# Patient Record
Sex: FEMALE | Race: WHITE | NOT HISPANIC OR LATINO
[De-identification: names, ages, dates, MRNs, and addresses within clinical notes are randomized per-mention and may not be internally consistent; named-entity substitution may affect disease eponyms.]

---

## 2017-02-16 PROBLEM — Z47.1 AFTERCARE FOLLOWING RIGHT KNEE JOINT REPLACEMENT SURGERY: Status: ACTIVE | Noted: 2017-02-16

## 2017-02-17 ENCOUNTER — APPOINTMENT (OUTPATIENT)
Dept: ORTHOPEDIC SURGERY | Facility: CLINIC | Age: 72
End: 2017-02-17

## 2017-02-17 VITALS
WEIGHT: 185 LBS | HEART RATE: 89 BPM | BODY MASS INDEX: 30.82 KG/M2 | SYSTOLIC BLOOD PRESSURE: 132 MMHG | DIASTOLIC BLOOD PRESSURE: 79 MMHG | HEIGHT: 65 IN

## 2017-02-17 DIAGNOSIS — Z47.1 AFTERCARE FOLLOWING JOINT REPLACEMENT SURGERY: ICD-10-CM

## 2017-02-17 DIAGNOSIS — M25.562 PAIN IN LEFT KNEE: ICD-10-CM

## 2017-02-17 DIAGNOSIS — M17.12 UNILATERAL PRIMARY OSTEOARTHRITIS, LEFT KNEE: ICD-10-CM

## 2017-02-17 DIAGNOSIS — Z96.651 AFTERCARE FOLLOWING JOINT REPLACEMENT SURGERY: ICD-10-CM

## 2017-02-23 ENCOUNTER — APPOINTMENT (OUTPATIENT)
Dept: CARDIOTHORACIC SURGERY | Facility: CLINIC | Age: 72
End: 2017-02-23

## 2017-02-23 VITALS
OXYGEN SATURATION: 98 % | SYSTOLIC BLOOD PRESSURE: 127 MMHG | BODY MASS INDEX: 30.82 KG/M2 | RESPIRATION RATE: 15 BRPM | TEMPERATURE: 98.2 F | WEIGHT: 185 LBS | DIASTOLIC BLOOD PRESSURE: 79 MMHG | HEART RATE: 90 BPM | HEIGHT: 65 IN

## 2017-02-23 DIAGNOSIS — Z98.890 OTHER SPECIFIED POSTPROCEDURAL STATES: ICD-10-CM

## 2017-02-23 DIAGNOSIS — Z95.1 PRESENCE OF AORTOCORONARY BYPASS GRAFT: ICD-10-CM

## 2017-02-23 RX ORDER — METOPROLOL SUCCINATE 50 MG/1
50 TABLET, EXTENDED RELEASE ORAL
Qty: 90 | Refills: 3 | Status: ACTIVE | COMMUNITY
Start: 2017-02-23

## 2017-02-24 ENCOUNTER — APPOINTMENT (OUTPATIENT)
Dept: CARDIOTHORACIC SURGERY | Facility: CLINIC | Age: 72
End: 2017-02-24

## 2017-03-10 ENCOUNTER — APPOINTMENT (OUTPATIENT)
Dept: ORTHOPEDIC SURGERY | Facility: CLINIC | Age: 72
End: 2017-03-10

## 2017-06-23 ENCOUNTER — APPOINTMENT (OUTPATIENT)
Dept: ORTHOPEDIC SURGERY | Facility: CLINIC | Age: 72
End: 2017-06-23

## 2017-06-23 VITALS
SYSTOLIC BLOOD PRESSURE: 131 MMHG | BODY MASS INDEX: 30.82 KG/M2 | DIASTOLIC BLOOD PRESSURE: 85 MMHG | HEIGHT: 65 IN | HEART RATE: 88 BPM | WEIGHT: 185 LBS

## 2017-06-23 DIAGNOSIS — M70.71 OTHER BURSITIS OF HIP, RIGHT HIP: ICD-10-CM

## 2017-09-14 ENCOUNTER — RX RENEWAL (OUTPATIENT)
Age: 72
End: 2017-09-14

## 2018-01-26 ENCOUNTER — APPOINTMENT (OUTPATIENT)
Dept: ORTHOPEDIC SURGERY | Facility: CLINIC | Age: 73
End: 2018-01-26
Payer: MEDICARE

## 2018-01-26 VITALS
HEART RATE: 70 BPM | DIASTOLIC BLOOD PRESSURE: 71 MMHG | WEIGHT: 185 LBS | BODY MASS INDEX: 30.82 KG/M2 | SYSTOLIC BLOOD PRESSURE: 141 MMHG | HEIGHT: 65 IN

## 2018-01-26 DIAGNOSIS — M25.551 PAIN IN RIGHT HIP: ICD-10-CM

## 2018-01-26 PROCEDURE — 73502 X-RAY EXAM HIP UNI 2-3 VIEWS: CPT

## 2018-01-26 PROCEDURE — 73562 X-RAY EXAM OF KNEE 3: CPT | Mod: RT

## 2018-01-26 PROCEDURE — 99213 OFFICE O/P EST LOW 20 MIN: CPT

## 2018-01-26 RX ORDER — FEXOFENADINE/PSEUDOEPHEDRINE 60MG-120MG
TABLET, EXTENDED RELEASE 12 HR ORAL
Refills: 0 | Status: ACTIVE | COMMUNITY

## 2018-01-26 RX ORDER — DULAGLUTIDE 1.5 MG/.5ML
1.5 INJECTION, SOLUTION SUBCUTANEOUS
Qty: 6 | Refills: 0 | Status: ACTIVE | COMMUNITY
Start: 2017-09-18

## 2018-01-26 RX ORDER — DOXYCYCLINE HYCLATE 100 MG/1
100 CAPSULE ORAL
Qty: 14 | Refills: 0 | Status: ACTIVE | COMMUNITY
Start: 2018-01-02

## 2018-01-26 RX ORDER — MELOXICAM 7.5 MG/1
7.5 TABLET ORAL
Qty: 30 | Refills: 1 | Status: ACTIVE | COMMUNITY
Start: 2018-01-26 | End: 1900-01-01

## 2018-09-27 ENCOUNTER — RX RENEWAL (OUTPATIENT)
Age: 73
End: 2018-09-27

## 2018-10-02 ENCOUNTER — RX RENEWAL (OUTPATIENT)
Age: 73
End: 2018-10-02

## 2019-06-30 ENCOUNTER — EMERGENCY (EMERGENCY)
Facility: HOSPITAL | Age: 74
LOS: 1 days | Discharge: HOME | End: 2019-06-30
Attending: EMERGENCY MEDICINE | Admitting: EMERGENCY MEDICINE
Payer: MEDICARE

## 2019-06-30 VITALS
OXYGEN SATURATION: 100 % | TEMPERATURE: 98 F | DIASTOLIC BLOOD PRESSURE: 79 MMHG | HEART RATE: 88 BPM | RESPIRATION RATE: 16 BRPM | SYSTOLIC BLOOD PRESSURE: 142 MMHG

## 2019-06-30 VITALS
HEIGHT: 65 IN | WEIGHT: 182.98 LBS | DIASTOLIC BLOOD PRESSURE: 66 MMHG | RESPIRATION RATE: 18 BRPM | SYSTOLIC BLOOD PRESSURE: 131 MMHG | TEMPERATURE: 99 F | HEART RATE: 88 BPM | OXYGEN SATURATION: 96 %

## 2019-06-30 DIAGNOSIS — Z98.49 CATARACT EXTRACTION STATUS, UNSPECIFIED EYE: Chronic | ICD-10-CM

## 2019-06-30 DIAGNOSIS — Z98.89 OTHER SPECIFIED POSTPROCEDURAL STATES: Chronic | ICD-10-CM

## 2019-06-30 DIAGNOSIS — Z96.651 PRESENCE OF RIGHT ARTIFICIAL KNEE JOINT: Chronic | ICD-10-CM

## 2019-06-30 PROCEDURE — 73090 X-RAY EXAM OF FOREARM: CPT | Mod: 26,LT

## 2019-06-30 PROCEDURE — 96372 THER/PROPH/DIAG INJ SC/IM: CPT | Mod: XU

## 2019-06-30 PROCEDURE — 29105 APPLICATION LONG ARM SPLINT: CPT

## 2019-06-30 PROCEDURE — 73060 X-RAY EXAM OF HUMERUS: CPT | Mod: 26,RT

## 2019-06-30 PROCEDURE — 99284 EMERGENCY DEPT VISIT MOD MDM: CPT

## 2019-06-30 PROCEDURE — 29105 APPLICATION LONG ARM SPLINT: CPT | Mod: RT

## 2019-06-30 PROCEDURE — 73060 X-RAY EXAM OF HUMERUS: CPT

## 2019-06-30 PROCEDURE — 99284 EMERGENCY DEPT VISIT MOD MDM: CPT | Mod: 25

## 2019-06-30 PROCEDURE — 73080 X-RAY EXAM OF ELBOW: CPT | Mod: 26,RT

## 2019-06-30 PROCEDURE — 73080 X-RAY EXAM OF ELBOW: CPT

## 2019-06-30 PROCEDURE — 73090 X-RAY EXAM OF FOREARM: CPT

## 2019-06-30 RX ORDER — TRAMADOL HYDROCHLORIDE 50 MG/1
1 TABLET ORAL
Qty: 12 | Refills: 0
Start: 2019-06-30 | End: 2019-07-02

## 2019-06-30 RX ORDER — TRAMADOL HYDROCHLORIDE 50 MG/1
50 TABLET ORAL ONCE
Refills: 0 | Status: DISCONTINUED | OUTPATIENT
Start: 2019-06-30 | End: 2019-06-30

## 2019-06-30 RX ORDER — KETOROLAC TROMETHAMINE 30 MG/ML
15 SYRINGE (ML) INJECTION ONCE
Refills: 0 | Status: DISCONTINUED | OUTPATIENT
Start: 2019-06-30 | End: 2019-06-30

## 2019-06-30 RX ORDER — KETOROLAC TROMETHAMINE 30 MG/ML
30 SYRINGE (ML) INJECTION ONCE
Refills: 0 | Status: DISCONTINUED | OUTPATIENT
Start: 2019-06-30 | End: 2019-06-30

## 2019-06-30 RX ADMIN — TRAMADOL HYDROCHLORIDE 50 MILLIGRAM(S): 50 TABLET ORAL at 19:56

## 2019-06-30 RX ADMIN — Medication 15 MILLIGRAM(S): at 19:57

## 2019-06-30 NOTE — ED PROVIDER NOTE - OBJECTIVE STATEMENT
74 y female presents right elbow pain, injury, states she tripped and fell at home in her yard on Wednesday, injured her right elbow, went to urgent care was diagnosed with a fx, splint was applied, states yesterday noticed arm swelling, splint feels "tight".   prescribed tramadol for pain.  denies head injury , no loc.  PMD  Dr Mcrae, Ortho Dr Bledsoe

## 2019-06-30 NOTE — ED PROVIDER NOTE - PROGRESS NOTE DETAILS
call out to ortho awaiting call back spoke with ortho resident , advised long arm splint follow up with her orthopedist splint applied, rx tramadol sent to pharmacy, advised follow up with her orthopedic Dr Bledsoe, patient requested name of orthopedic , given information for Dr Deutsch, ortho, states she was also given information for an orthopedic , she will call tomorrow

## 2019-06-30 NOTE — ED PROVIDER NOTE - ATTENDING CONTRIBUTION TO CARE
I have personally performed a face to face diagnostic evaluation on this patient.  I have reviewed the PA note and agree with the history, exam, and plan of care, except as noted.  History and Exam by me shows patient c/o right elbow pain, increased swelling of hand and arm, patient tripped and fell onto right elbow on Wednesday, went to urgent care and was splinted, patient alert and oriented, right hand edema, left upper arm eccymosis, tender to right elbow, able to open and close hand, extend wrist, (+)radial pulse, f/u xrays, ortho.

## 2019-06-30 NOTE — ED ADULT NURSE NOTE - NSIMPLEMENTINTERV_GEN_ALL_ED
Implemented All Fall Risk Interventions:  [place] to call system. Call bell, personal items and telephone within reach. Instruct patient to call for assistance. Room bathroom lighting operational. Non-slip footwear when patient is off stretcher. Physically safe environment: no spills, clutter or unnecessary equipment. Stretcher in lowest position, wheels locked, appropriate side rails in place. Provide visual cue, wrist band, yellow gown, etc. Monitor gait and stability. Monitor for mental status changes and reorient to person, place, and time. Review medications for side effects contributing to fall risk. Reinforce activity limits and safety measures with patient and family.

## 2019-06-30 NOTE — ED ADULT NURSE NOTE - OBJECTIVE STATEMENT
pt fell on Wednesday went to  was dx with right elbow fx and told to f/u with Ortho- pt arm splinted and wrapped - pt c/o pain and swelling to hand and above splint on arm

## 2020-06-12 ENCOUNTER — APPOINTMENT (OUTPATIENT)
Dept: ORTHOPEDIC SURGERY | Facility: CLINIC | Age: 75
End: 2020-06-12
Payer: MEDICARE

## 2020-06-12 VITALS
HEIGHT: 65 IN | WEIGHT: 185 LBS | SYSTOLIC BLOOD PRESSURE: 133 MMHG | HEART RATE: 69 BPM | DIASTOLIC BLOOD PRESSURE: 70 MMHG | BODY MASS INDEX: 30.82 KG/M2

## 2020-06-12 VITALS — TEMPERATURE: 97.1 F

## 2020-06-12 PROCEDURE — 73562 X-RAY EXAM OF KNEE 3: CPT | Mod: RT

## 2020-06-12 PROCEDURE — 99213 OFFICE O/P EST LOW 20 MIN: CPT

## 2020-06-12 PROCEDURE — 72170 X-RAY EXAM OF PELVIS: CPT | Mod: 59

## 2020-06-12 NOTE — HISTORY OF PRESENT ILLNESS
[de-identified] : The patient is a 75 female who presents today for evaluation of right knee. She is status post right total knee replacement by Dr. Shay approximately 10 years a. She states that she has been doing very well but was recently she started to notice some discomfort in her knee. She does state that 2 years ago she did sustain an injury to her elbow/fracture following a fall in which she also injured her knee. She also states that last year she fell and fractured her right shoulder and injured her knee. After both incidences she states the pain in her knee with short-lived and returned back to her normal activities. She states that she's been doing very well until about a few weeks ago. Now she states she has some pain and also the stiffening sensation in her knee which radiates up her thigh. Patient has been seen in the past regarding her right hip she was explained that she does have severe osteoarthritis in the require a total hip replacement. She was treated in the past with intra-articular cortisone injections she states she had some relief for a short period. She denies any recent injury he describes his pain as a tightness or with stiffness in her knee which does radiate up towards her hip. She denies any swelling buckling or locking. [Stable] : stable

## 2020-06-12 NOTE — PHYSICAL EXAM
[Cane] : ambulates with cane [Antalgic] : antalgic [LE] : 5/5 motor strength in bilateral lower extremities [ALL] : dorsalis pedis, posterior tibial, femoral, popliteal, and radial 2+ and symmetric bilaterally [de-identified] : Physical examination of the right knee. Patient is status post right total knee replacement. There is a well-healed surgical scar noted. With no evidence of infection superficial or deep. There is no redness or heat or discharge noted. There is minimal diffuse pain and tenderness primarily in the patellofemoral compartment and the area of the facet and transverse. The alignment is within neutral position. There is no evidence of ligamentous laxity. No evidence of any muscle atrophy. She does have good range of motion in regards to her right knee and evidence of any muscle atrophy. No evidence of any motor or sensory deficit. Patient does have good distal pulses with no calf tenderness.\par \par Upon further evaluation for the right hip was examined which shows marked pain and tenderness on the lateral aspect of the hip which does radiate into the groin. This is accompanied with a marked decreased range of motion in all planes of the right there is no evidence of leg length discrepancy. No evidence of any muscle atrophy. Mild to minimal or sensory deficit. [de-identified] : X-rays of the right knee reveal status post right total knee replacement. Hardware is in place and shows excellent alignment as well as fixation. There is no evidence of any fracture dislocation or loosening of hardware.\par \par As of the pelvis shows severe osteoarthritis with marked joint space. There is some osteophyte formation noted as well.

## 2020-06-12 NOTE — DISCUSSION/SUMMARY
[de-identified] : Plan the patient is to continue with the present level of activities. She wasn't sure that she is doing very well following her right total knee replacement. However the discomfort that she is feeling is most associated with her right hip. Patient does have severe osteoarthritic changes of the right hip. I was explained the different modalities of treatment. She was referred for a intra-articular cortisone injection to the performed ultrasound or Fluro. He was explained the risks benefits pros and cons of injection as well as alternatives. It was also explained that this is not a guarantee her complete relief of her symptoms. Note is afebrile for her osteoarthritis. Patient understands this intra-articular injection. She is advised to continue with a good home exercise  was seated and says when needed.

## 2020-07-07 ENCOUNTER — APPOINTMENT (OUTPATIENT)
Dept: ULTRASOUND IMAGING | Facility: CLINIC | Age: 75
End: 2020-07-07
Payer: MEDICARE

## 2020-07-07 ENCOUNTER — OUTPATIENT (OUTPATIENT)
Dept: OUTPATIENT SERVICES | Facility: HOSPITAL | Age: 75
LOS: 1 days | End: 2020-07-07

## 2020-07-07 ENCOUNTER — RESULT REVIEW (OUTPATIENT)
Age: 75
End: 2020-07-07

## 2020-07-07 DIAGNOSIS — Z98.49 CATARACT EXTRACTION STATUS, UNSPECIFIED EYE: Chronic | ICD-10-CM

## 2020-07-07 DIAGNOSIS — Z98.89 OTHER SPECIFIED POSTPROCEDURAL STATES: Chronic | ICD-10-CM

## 2020-07-07 DIAGNOSIS — Z96.651 PRESENCE OF RIGHT ARTIFICIAL KNEE JOINT: Chronic | ICD-10-CM

## 2020-07-07 DIAGNOSIS — M16.11 UNILATERAL PRIMARY OSTEOARTHRITIS, RIGHT HIP: ICD-10-CM

## 2020-07-07 PROCEDURE — 20611 DRAIN/INJ JOINT/BURSA W/US: CPT | Mod: RT

## 2020-08-13 ENCOUNTER — APPOINTMENT (OUTPATIENT)
Dept: SURGERY | Facility: CLINIC | Age: 75
End: 2020-08-13
Payer: MEDICARE

## 2020-08-13 PROCEDURE — 99213 OFFICE O/P EST LOW 20 MIN: CPT

## 2020-08-27 ENCOUNTER — APPOINTMENT (OUTPATIENT)
Dept: SURGERY | Facility: CLINIC | Age: 75
End: 2020-08-27

## 2020-08-28 ENCOUNTER — RESULT REVIEW (OUTPATIENT)
Age: 75
End: 2020-08-28

## 2020-08-28 ENCOUNTER — OUTPATIENT (OUTPATIENT)
Dept: OUTPATIENT SERVICES | Facility: HOSPITAL | Age: 75
LOS: 1 days | End: 2020-08-28
Payer: MEDICARE

## 2020-08-28 VITALS
RESPIRATION RATE: 16 BRPM | HEART RATE: 75 BPM | DIASTOLIC BLOOD PRESSURE: 77 MMHG | HEIGHT: 65 IN | SYSTOLIC BLOOD PRESSURE: 132 MMHG | WEIGHT: 177.91 LBS | TEMPERATURE: 97 F | OXYGEN SATURATION: 96 %

## 2020-08-28 DIAGNOSIS — Z98.49 CATARACT EXTRACTION STATUS, UNSPECIFIED EYE: Chronic | ICD-10-CM

## 2020-08-28 DIAGNOSIS — Z98.890 OTHER SPECIFIED POSTPROCEDURAL STATES: Chronic | ICD-10-CM

## 2020-08-28 DIAGNOSIS — Z95.1 PRESENCE OF AORTOCORONARY BYPASS GRAFT: Chronic | ICD-10-CM

## 2020-08-28 DIAGNOSIS — D48.61 NEOPLASM OF UNCERTAIN BEHAVIOR OF RIGHT BREAST: ICD-10-CM

## 2020-08-28 DIAGNOSIS — Z98.89 OTHER SPECIFIED POSTPROCEDURAL STATES: Chronic | ICD-10-CM

## 2020-08-28 DIAGNOSIS — Z01.818 ENCOUNTER FOR OTHER PREPROCEDURAL EXAMINATION: ICD-10-CM

## 2020-08-28 DIAGNOSIS — Z96.651 PRESENCE OF RIGHT ARTIFICIAL KNEE JOINT: Chronic | ICD-10-CM

## 2020-08-28 DIAGNOSIS — S42.409A UNSPECIFIED FRACTURE OF LOWER END OF UNSPECIFIED HUMERUS, INITIAL ENCOUNTER FOR CLOSED FRACTURE: Chronic | ICD-10-CM

## 2020-08-28 LAB
ALBUMIN SERPL ELPH-MCNC: 4.4 G/DL — SIGNIFICANT CHANGE UP (ref 3.3–5)
ALP SERPL-CCNC: 51 U/L — SIGNIFICANT CHANGE UP (ref 40–120)
ALT FLD-CCNC: 69 U/L — SIGNIFICANT CHANGE UP (ref 12–78)
ANION GAP SERPL CALC-SCNC: 4 MMOL/L — LOW (ref 5–17)
AST SERPL-CCNC: 62 U/L — HIGH (ref 15–37)
BILIRUB SERPL-MCNC: 0.7 MG/DL — SIGNIFICANT CHANGE UP (ref 0.2–1.2)
BUN SERPL-MCNC: 14 MG/DL — SIGNIFICANT CHANGE UP (ref 7–23)
CALCIUM SERPL-MCNC: 10.2 MG/DL — HIGH (ref 8.5–10.1)
CHLORIDE SERPL-SCNC: 105 MMOL/L — SIGNIFICANT CHANGE UP (ref 96–108)
CO2 SERPL-SCNC: 30 MMOL/L — SIGNIFICANT CHANGE UP (ref 22–31)
CREAT SERPL-MCNC: 0.52 MG/DL — SIGNIFICANT CHANGE UP (ref 0.5–1.3)
GLUCOSE SERPL-MCNC: 93 MG/DL — SIGNIFICANT CHANGE UP (ref 70–99)
HCT VFR BLD CALC: 38.4 % — SIGNIFICANT CHANGE UP (ref 34.5–45)
HGB BLD-MCNC: 12.8 G/DL — SIGNIFICANT CHANGE UP (ref 11.5–15.5)
MCHC RBC-ENTMCNC: 27.5 PG — SIGNIFICANT CHANGE UP (ref 27–34)
MCHC RBC-ENTMCNC: 33.3 GM/DL — SIGNIFICANT CHANGE UP (ref 32–36)
MCV RBC AUTO: 82.4 FL — SIGNIFICANT CHANGE UP (ref 80–100)
NRBC # BLD: 0 /100 WBCS — SIGNIFICANT CHANGE UP (ref 0–0)
PLATELET # BLD AUTO: 196 K/UL — SIGNIFICANT CHANGE UP (ref 150–400)
POTASSIUM SERPL-MCNC: 4.2 MMOL/L — SIGNIFICANT CHANGE UP (ref 3.5–5.3)
POTASSIUM SERPL-SCNC: 4.2 MMOL/L — SIGNIFICANT CHANGE UP (ref 3.5–5.3)
PROT SERPL-MCNC: 7.9 G/DL — SIGNIFICANT CHANGE UP (ref 6–8.3)
RBC # BLD: 4.66 M/UL — SIGNIFICANT CHANGE UP (ref 3.8–5.2)
RBC # FLD: 13 % — SIGNIFICANT CHANGE UP (ref 10.3–14.5)
SODIUM SERPL-SCNC: 139 MMOL/L — SIGNIFICANT CHANGE UP (ref 135–145)
WBC # BLD: 6.56 K/UL — SIGNIFICANT CHANGE UP (ref 3.8–10.5)
WBC # FLD AUTO: 6.56 K/UL — SIGNIFICANT CHANGE UP (ref 3.8–10.5)

## 2020-08-28 PROCEDURE — 93010 ELECTROCARDIOGRAM REPORT: CPT

## 2020-08-28 PROCEDURE — 80053 COMPREHEN METABOLIC PANEL: CPT

## 2020-08-28 PROCEDURE — 93005 ELECTROCARDIOGRAM TRACING: CPT

## 2020-08-28 PROCEDURE — 85027 COMPLETE CBC AUTOMATED: CPT

## 2020-08-28 PROCEDURE — 83036 HEMOGLOBIN GLYCOSYLATED A1C: CPT

## 2020-08-28 PROCEDURE — 88321 CONSLTJ&REPRT SLD PREP ELSWR: CPT

## 2020-08-28 PROCEDURE — G0463: CPT

## 2020-08-28 PROCEDURE — 36415 COLL VENOUS BLD VENIPUNCTURE: CPT

## 2020-08-28 RX ORDER — AZELASTINE 137 UG/1
0 SPRAY, METERED NASAL
Qty: 0 | Refills: 0 | DISCHARGE

## 2020-08-28 RX ORDER — FLUOXETINE HCL 10 MG
1 CAPSULE ORAL
Qty: 0 | Refills: 0 | DISCHARGE

## 2020-08-28 RX ORDER — ATORVASTATIN CALCIUM 80 MG/1
0 TABLET, FILM COATED ORAL
Qty: 0 | Refills: 0 | DISCHARGE

## 2020-08-28 RX ORDER — OMEPRAZOLE 10 MG/1
1 CAPSULE, DELAYED RELEASE ORAL
Qty: 0 | Refills: 0 | DISCHARGE

## 2020-08-28 RX ORDER — GLIMEPIRIDE 1 MG
0 TABLET ORAL
Qty: 0 | Refills: 0 | DISCHARGE

## 2020-08-28 RX ORDER — ASPIRIN/CALCIUM CARB/MAGNESIUM 324 MG
0 TABLET ORAL
Qty: 0 | Refills: 0 | DISCHARGE

## 2020-08-28 NOTE — H&P PST ADULT - NSICDXPASTSURGICALHX_GEN_ALL_CORE_FT
PAST SURGICAL HISTORY:  Elbow fracture right 2019    H/O cataract extraction 2005    H/O lumpectomy right breast - benign 1993    H/O mitral valve repair 2005    History of right knee joint replacement 2005    Status post double vessel coronary artery bypass

## 2020-08-28 NOTE — H&P PST ADULT - NSICDXFAMILYHX_GEN_ALL_CORE_FT
FAMILY HISTORY:  FH: arthritis  FH: type 2 diabetes, sister 79 brother 86    Father  Still living? No  Congestive heart failure, Age at diagnosis: Age Unknown    Mother  Still living? No  Family history of breast cancer in mother, Age at diagnosis: Age Unknown

## 2020-08-28 NOTE — H&P PST ADULT - NSICDXPASTMEDICALHX_GEN_ALL_CORE_FT
PAST MEDICAL HISTORY:  Anxiety     Arthritis     Atherosclerotic heart disease     Cataracts, both eyes     Diabetes type 2 8years    Environmental allergies     Essential hypertension     Gastroesophageal reflux disease without esophagitis     H/O: pneumonia treated in August 2016 as an outpatient for pneumonia    Hyperlipidemia, unspecified hyperlipidemia type     OAB (overactive bladder)     Osteopenia     Overactive bladder     Type 2 diabetes mellitus without complication, without long-term current use of insulin PAST MEDICAL HISTORY:  Anxiety     Arthritis     Atherosclerotic heart disease     Cataracts, both eyes     Diabetes type 2 8years    Environmental allergies     Essential hypertension     Gastroesophageal reflux disease without esophagitis     H/O: pneumonia treated in August 2016 as an outpatient for pneumonia    Hyperlipidemia, unspecified hyperlipidemia type     Irritable bowel     Neoplasm of uncertain behavior of breast, right     OAB (overactive bladder)     Osteopenia

## 2020-08-28 NOTE — H&P PST ADULT - ASSESSMENT
76 yo female scheduled for Excisional Biopsy Right Breast Mass with Bioptic Needle Localization on 9/11/20 with Dr Chaudhry.

## 2020-08-29 LAB
A1C WITH ESTIMATED AVERAGE GLUCOSE RESULT: 7 % — HIGH (ref 4–5.6)
ESTIMATED AVERAGE GLUCOSE: 154 MG/DL — HIGH (ref 68–114)

## 2020-09-08 PROBLEM — K58.9 IRRITABLE BOWEL SYNDROME WITHOUT DIARRHEA: Chronic | Status: ACTIVE | Noted: 2020-08-28

## 2020-09-08 PROBLEM — M85.80 OTHER SPECIFIED DISORDERS OF BONE DENSITY AND STRUCTURE, UNSPECIFIED SITE: Chronic | Status: ACTIVE | Noted: 2020-08-28

## 2020-09-08 LAB — SURGICAL PATHOLOGY STUDY: SIGNIFICANT CHANGE UP

## 2020-09-09 ENCOUNTER — OUTPATIENT (OUTPATIENT)
Dept: OUTPATIENT SERVICES | Facility: HOSPITAL | Age: 75
LOS: 1 days | End: 2020-09-09
Payer: MEDICARE

## 2020-09-09 DIAGNOSIS — Z98.89 OTHER SPECIFIED POSTPROCEDURAL STATES: Chronic | ICD-10-CM

## 2020-09-09 DIAGNOSIS — Z98.890 OTHER SPECIFIED POSTPROCEDURAL STATES: Chronic | ICD-10-CM

## 2020-09-09 DIAGNOSIS — Z96.651 PRESENCE OF RIGHT ARTIFICIAL KNEE JOINT: Chronic | ICD-10-CM

## 2020-09-09 DIAGNOSIS — Z11.59 ENCOUNTER FOR SCREENING FOR OTHER VIRAL DISEASES: ICD-10-CM

## 2020-09-09 DIAGNOSIS — Z95.1 PRESENCE OF AORTOCORONARY BYPASS GRAFT: Chronic | ICD-10-CM

## 2020-09-09 DIAGNOSIS — Z98.49 CATARACT EXTRACTION STATUS, UNSPECIFIED EYE: Chronic | ICD-10-CM

## 2020-09-09 DIAGNOSIS — S42.409A UNSPECIFIED FRACTURE OF LOWER END OF UNSPECIFIED HUMERUS, INITIAL ENCOUNTER FOR CLOSED FRACTURE: Chronic | ICD-10-CM

## 2020-09-09 LAB — SARS-COV-2 RNA SPEC QL NAA+PROBE: SIGNIFICANT CHANGE UP

## 2020-09-09 PROCEDURE — U0003: CPT

## 2020-09-10 ENCOUNTER — TRANSCRIPTION ENCOUNTER (OUTPATIENT)
Age: 75
End: 2020-09-10

## 2020-09-10 NOTE — ASU PATIENT PROFILE, ADULT - PMH
Anxiety    Arthritis    Atherosclerotic heart disease    Cataracts, both eyes    Diabetes  type 2 8years  Environmental allergies    Essential hypertension    Gastroesophageal reflux disease without esophagitis    H/O: pneumonia  treated in August 2016 as an outpatient for pneumonia  Hyperlipidemia, unspecified hyperlipidemia type    Irritable bowel    Neoplasm of uncertain behavior of breast, right    OAB (overactive bladder)    Osteopenia

## 2020-09-10 NOTE — ASU PATIENT PROFILE, ADULT - PSH
Elbow fracture  right 2019  H/O cataract extraction  2005  H/O lumpectomy  right breast - benign 1993  H/O mitral valve repair  2005  History of right knee joint replacement  2005  Status post double vessel coronary artery bypass

## 2020-09-11 ENCOUNTER — APPOINTMENT (OUTPATIENT)
Dept: SURGERY | Facility: HOSPITAL | Age: 75
End: 2020-09-11
Payer: MEDICARE

## 2020-09-11 ENCOUNTER — OUTPATIENT (OUTPATIENT)
Dept: OUTPATIENT SERVICES | Facility: HOSPITAL | Age: 75
LOS: 1 days | End: 2020-09-11
Payer: MEDICARE

## 2020-09-11 ENCOUNTER — RESULT REVIEW (OUTPATIENT)
Age: 75
End: 2020-09-11

## 2020-09-11 VITALS
DIASTOLIC BLOOD PRESSURE: 63 MMHG | SYSTOLIC BLOOD PRESSURE: 136 MMHG | OXYGEN SATURATION: 96 % | HEART RATE: 78 BPM | RESPIRATION RATE: 14 BRPM

## 2020-09-11 VITALS
RESPIRATION RATE: 17 BRPM | SYSTOLIC BLOOD PRESSURE: 158 MMHG | TEMPERATURE: 99 F | OXYGEN SATURATION: 98 % | DIASTOLIC BLOOD PRESSURE: 74 MMHG | HEIGHT: 65 IN | HEART RATE: 85 BPM | WEIGHT: 177.91 LBS

## 2020-09-11 DIAGNOSIS — N63.10 UNSPECIFIED LUMP IN THE RIGHT BREAST, UNSPECIFIED QUADRANT: ICD-10-CM

## 2020-09-11 DIAGNOSIS — D48.61 NEOPLASM OF UNCERTAIN BEHAVIOR OF RIGHT BREAST: ICD-10-CM

## 2020-09-11 DIAGNOSIS — Z96.651 PRESENCE OF RIGHT ARTIFICIAL KNEE JOINT: Chronic | ICD-10-CM

## 2020-09-11 DIAGNOSIS — S42.409A UNSPECIFIED FRACTURE OF LOWER END OF UNSPECIFIED HUMERUS, INITIAL ENCOUNTER FOR CLOSED FRACTURE: Chronic | ICD-10-CM

## 2020-09-11 DIAGNOSIS — Z98.890 OTHER SPECIFIED POSTPROCEDURAL STATES: Chronic | ICD-10-CM

## 2020-09-11 DIAGNOSIS — Z95.1 PRESENCE OF AORTOCORONARY BYPASS GRAFT: Chronic | ICD-10-CM

## 2020-09-11 DIAGNOSIS — Z01.818 ENCOUNTER FOR OTHER PREPROCEDURAL EXAMINATION: ICD-10-CM

## 2020-09-11 DIAGNOSIS — Z98.49 CATARACT EXTRACTION STATUS, UNSPECIFIED EYE: Chronic | ICD-10-CM

## 2020-09-11 DIAGNOSIS — Z98.89 OTHER SPECIFIED POSTPROCEDURAL STATES: Chronic | ICD-10-CM

## 2020-09-11 PROCEDURE — 19281 PERQ DEVICE BREAST 1ST IMAG: CPT

## 2020-09-11 PROCEDURE — 76098 X-RAY EXAM SURGICAL SPECIMEN: CPT

## 2020-09-11 PROCEDURE — 19125 EXCISION BREAST LESION: CPT | Mod: RT

## 2020-09-11 PROCEDURE — 19282 PERQ DEVICE BREAST EA IMAG: CPT

## 2020-09-11 PROCEDURE — 82962 GLUCOSE BLOOD TEST: CPT

## 2020-09-11 PROCEDURE — 19281 PERQ DEVICE BREAST 1ST IMAG: CPT | Mod: RT

## 2020-09-11 PROCEDURE — 19125 EXCISION BREAST LESION: CPT

## 2020-09-11 PROCEDURE — 76098 X-RAY EXAM SURGICAL SPECIMEN: CPT | Mod: 26

## 2020-09-11 PROCEDURE — C1889: CPT

## 2020-09-11 PROCEDURE — 88307 TISSUE EXAM BY PATHOLOGIST: CPT

## 2020-09-11 PROCEDURE — 88307 TISSUE EXAM BY PATHOLOGIST: CPT | Mod: 26

## 2020-09-11 RX ORDER — SODIUM CHLORIDE 9 MG/ML
1000 INJECTION, SOLUTION INTRAVENOUS
Refills: 0 | Status: DISCONTINUED | OUTPATIENT
Start: 2020-09-11 | End: 2020-09-25

## 2020-09-11 RX ORDER — CEFAZOLIN SODIUM 1 G
1000 VIAL (EA) INJECTION ONCE
Refills: 0 | Status: COMPLETED | OUTPATIENT
Start: 2020-09-11 | End: 2020-09-11

## 2020-09-11 RX ORDER — SODIUM CHLORIDE 9 MG/ML
1000 INJECTION, SOLUTION INTRAVENOUS
Refills: 0 | Status: DISCONTINUED | OUTPATIENT
Start: 2020-09-11 | End: 2020-09-11

## 2020-09-11 RX ORDER — HYDROMORPHONE HYDROCHLORIDE 2 MG/ML
0.5 INJECTION INTRAMUSCULAR; INTRAVENOUS; SUBCUTANEOUS ONCE
Refills: 0 | Status: DISCONTINUED | OUTPATIENT
Start: 2020-09-11 | End: 2020-09-13

## 2020-09-11 RX ORDER — HYDROMORPHONE HYDROCHLORIDE 2 MG/ML
1 INJECTION INTRAMUSCULAR; INTRAVENOUS; SUBCUTANEOUS ONCE
Refills: 0 | Status: DISCONTINUED | OUTPATIENT
Start: 2020-09-11 | End: 2020-09-13

## 2020-09-11 RX ORDER — ONDANSETRON 8 MG/1
4 TABLET, FILM COATED ORAL ONCE
Refills: 0 | Status: DISCONTINUED | OUTPATIENT
Start: 2020-09-11 | End: 2020-09-13

## 2020-09-11 RX ADMIN — SODIUM CHLORIDE 75 MILLILITER(S): 9 INJECTION, SOLUTION INTRAVENOUS at 08:20

## 2020-09-11 NOTE — ASU DISCHARGE PLAN (ADULT/PEDIATRIC) - CARE PROVIDER_API CALL
Mark Chaudhry (DO)  Surgery  [address] [facility], [address]  [place], [place] [id number]  Phone: [phone]  Fax: [phone]  Follow Up Time:

## 2020-09-11 NOTE — ASU DISCHARGE PLAN (ADULT/PEDIATRIC) - CALL YOUR DOCTOR IF YOU HAVE ANY OF THE FOLLOWING:
Fever greater than (need to indicate Fahrenheit or Celsius)/Wound/Surgical Site with redness, or foul smelling discharge or pus

## 2020-09-11 NOTE — ASU DISCHARGE PLAN (ADULT/PEDIATRIC) - ASU DC SPECIAL INSTRUCTIONSFT
You may shower in 24 hours.  Do not remove steri-strips.  Do not let water hit wounds directly, do not rub incisions.    No heavy lifting (nothing heavier than 5 lbs.), no strenuous physical activity, no exercise.    You may perform your usual daily tasks as tolerated.    Take Norco as needed for severe pain. May take over the counter Tylenol as needed for moderate or severe pain. Note do not exceed 4000 mg of Tylenol a day.   Follow-up with Dr. Chaudhry in his office as per office instructions discussed during your pre-operative office consultation.

## 2020-09-15 LAB — SURGICAL PATHOLOGY STUDY: SIGNIFICANT CHANGE UP

## 2020-09-24 ENCOUNTER — APPOINTMENT (OUTPATIENT)
Dept: SURGERY | Facility: CLINIC | Age: 75
End: 2020-09-24
Payer: MEDICARE

## 2020-09-24 PROCEDURE — 99024 POSTOP FOLLOW-UP VISIT: CPT

## 2020-10-23 ENCOUNTER — APPOINTMENT (OUTPATIENT)
Dept: ORTHOPEDIC SURGERY | Facility: CLINIC | Age: 75
End: 2020-10-23
Payer: MEDICARE

## 2020-10-23 VITALS
WEIGHT: 185 LBS | HEART RATE: 92 BPM | SYSTOLIC BLOOD PRESSURE: 149 MMHG | DIASTOLIC BLOOD PRESSURE: 81 MMHG | BODY MASS INDEX: 30.82 KG/M2 | HEIGHT: 65 IN

## 2020-10-23 VITALS — TEMPERATURE: 96.2 F

## 2020-10-23 PROCEDURE — 73562 X-RAY EXAM OF KNEE 3: CPT | Mod: RT

## 2020-10-23 PROCEDURE — 73502 X-RAY EXAM HIP UNI 2-3 VIEWS: CPT | Mod: RT

## 2020-10-23 PROCEDURE — 99213 OFFICE O/P EST LOW 20 MIN: CPT

## 2020-10-23 RX ORDER — MELOXICAM 7.5 MG/1
7.5 TABLET ORAL DAILY
Qty: 60 | Refills: 0 | Status: ACTIVE | COMMUNITY
Start: 2020-10-23 | End: 1900-01-01

## 2020-10-23 NOTE — PHYSICAL EXAM
[Antalgic] : antalgic [Cane] : ambulates with cane [LE] : Sensory: Intact in bilateral lower extremities [ALL] : dorsalis pedis, posterior tibial, femoral, popliteal, and radial 2+ and symmetric bilaterally [de-identified] : On physical examination of the right hip. The skin is clean dry and intact with no areas of redness swelling or heat noted. There is pain and tenderness on the lateral aspect of the hip which does radiate into the groin. This pain is worsened with any attempts of range of motion. There is a decreased range of motion in her right hip. She is able to fully extend the hip. Flexion to 100°. However internal rotation is less than 5° and external rotation is less than 35°. Abduction and abduction are both approximately 20°. There is no evidence of ligament discrepancy. A mini muscle atrophy. No evidence of any motor or sensory deficit. Patient has good distal pulses with no calf tenderness.\par \par On physical examination of the right knee. Patient is status post right total knee replacement over 10 years ago. There is a well-healed surgical scar with no evidence of infection superficial or deep. There is no redness swelling or discharge noted. There is some mild diffuse pain and tenderness noted. The overall alignment is with the neutral position. She has excellent range of motion with no ligamentous laxity. Patient has good distal pulses with no calf tenderness. [de-identified] : X-rays of her right knee reveals status post right total knee replacement. The prosthesis in place with good fixation. There is no evidence of any fracture dislocation or loosening of any hardware. \par \par Plan X-rays of her right hip show osteoarthritic changes with narrowing of the joint spacing. There is no evidence of any fracture or dislocation noted.

## 2020-10-23 NOTE — HISTORY OF PRESENT ILLNESS
[de-identified] : Patient is a 75-year-old female who presents today for evaluation of her right knee and her right hip. She is status post right total knee replacement many years ago and was doing very well. However she states that most recently she's had numerous falls which caused her to fall onto her knee. She states that with these falls her knee has become a little bit more symptomatic and would like to be seen to evaluate the prosthesis. She states that she does walk because of the weakness in her right lower extremity as well as a feeling of dizziness. She states that when she was seen in February she did receive an intra-articular orders on injection to her hip which markedly improved her condition. However she states most recently the pain has returned along with stiffness. [Worsening] : worsening

## 2020-10-23 NOTE — DISCUSSION/SUMMARY
[de-identified] : Plan the patient is to continue with the present level of activities. She is advised to continue with the exercise program alone is less moist heat and anti-inflammatories. She was given a prescription for Mobic which was prescribed to her pharmacy. Advised to try an additional intra-articular cortisone injection. She was given a prescription to receive this injection via fluoroscopy or ultrasound-guided was explained and proceed with future she may require a total hip replacement. In the meantime it is recommended that she continue with this conservative measures.

## 2020-11-09 ENCOUNTER — APPOINTMENT (OUTPATIENT)
Dept: ULTRASOUND IMAGING | Facility: CLINIC | Age: 75
End: 2020-11-09
Payer: MEDICARE

## 2020-11-09 ENCOUNTER — OUTPATIENT (OUTPATIENT)
Dept: OUTPATIENT SERVICES | Facility: HOSPITAL | Age: 75
LOS: 1 days | End: 2020-11-09

## 2020-11-09 DIAGNOSIS — S42.409A UNSPECIFIED FRACTURE OF LOWER END OF UNSPECIFIED HUMERUS, INITIAL ENCOUNTER FOR CLOSED FRACTURE: Chronic | ICD-10-CM

## 2020-11-09 DIAGNOSIS — M16.11 UNILATERAL PRIMARY OSTEOARTHRITIS, RIGHT HIP: ICD-10-CM

## 2020-11-09 DIAGNOSIS — Z95.1 PRESENCE OF AORTOCORONARY BYPASS GRAFT: Chronic | ICD-10-CM

## 2020-11-09 DIAGNOSIS — Z96.651 PRESENCE OF RIGHT ARTIFICIAL KNEE JOINT: Chronic | ICD-10-CM

## 2020-11-09 DIAGNOSIS — Z98.49 CATARACT EXTRACTION STATUS, UNSPECIFIED EYE: Chronic | ICD-10-CM

## 2020-11-09 DIAGNOSIS — Z98.89 OTHER SPECIFIED POSTPROCEDURAL STATES: Chronic | ICD-10-CM

## 2020-11-09 DIAGNOSIS — Z98.890 OTHER SPECIFIED POSTPROCEDURAL STATES: Chronic | ICD-10-CM

## 2020-11-09 PROCEDURE — 20611 DRAIN/INJ JOINT/BURSA W/US: CPT | Mod: RT

## 2021-01-07 ENCOUNTER — APPOINTMENT (OUTPATIENT)
Dept: SURGERY | Facility: CLINIC | Age: 76
End: 2021-01-07
Payer: MEDICARE

## 2021-01-07 PROCEDURE — 99213 OFFICE O/P EST LOW 20 MIN: CPT

## 2021-01-25 NOTE — H&P PST ADULT - HISTORY OF PRESENT ILLNESS
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, s/p ex-lap, sigmoidectomy and creation of end colostomy, now POD2 s/p an evacuation of ABD hematoma, recovering well on the floors    Plan:  - C/w dysphagia diet  - C/w LR IVF  - C/w home meds  - Pain control  - F/u AM labs, replete electrolytes as necessary  - DVT ppx with lovenox  - Dispo: lives at group home who is willing to teach staff ostomy care      Thanh Chapman MD, PGY-1  B team Surgery [id number] 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, s/p ex-lap, sigmoidectomy and creation of end colostomy, now POD2 s/p an evacuation of ABD hematoma, recovering well on the floors    Plan:  - C/w dysphagia diet  - Start Miralax  - C/w LR IVF  - C/w home meds  - Pain control  - F/u AM labs, replete electrolytes as necessary  - DVT ppx with lovenox  - Dispo: lives at group home who is willing to teach staff ostomy care  - DC when ostomy fxrobyn Chapman MD, PGY-1  B team Surgery [id number] 76 yo female scheduled for Excisional Biopsy Right Breast Mass with Bioptic Needle Localization on 9/11/20 with Dr Chaudhry.  Patient states  she had an abnormal Mammogram this summer followed by abnormal sonogram and MRI Breast

## 2021-04-30 ENCOUNTER — APPOINTMENT (OUTPATIENT)
Dept: ORTHOPEDIC SURGERY | Facility: CLINIC | Age: 76
End: 2021-04-30
Payer: MEDICARE

## 2021-04-30 VITALS
SYSTOLIC BLOOD PRESSURE: 144 MMHG | WEIGHT: 185 LBS | BODY MASS INDEX: 30.82 KG/M2 | HEIGHT: 65 IN | HEART RATE: 83 BPM | DIASTOLIC BLOOD PRESSURE: 79 MMHG

## 2021-04-30 DIAGNOSIS — Z96.651 PRESENCE OF RIGHT ARTIFICIAL KNEE JOINT: ICD-10-CM

## 2021-04-30 DIAGNOSIS — M16.11 UNILATERAL PRIMARY OSTEOARTHRITIS, RIGHT HIP: ICD-10-CM

## 2021-04-30 PROCEDURE — 73502 X-RAY EXAM HIP UNI 2-3 VIEWS: CPT | Mod: RT

## 2021-04-30 PROCEDURE — 99214 OFFICE O/P EST MOD 30 MIN: CPT

## 2021-04-30 PROCEDURE — 73562 X-RAY EXAM OF KNEE 3: CPT | Mod: RT

## 2021-04-30 NOTE — END OF VISIT
[FreeTextEntry3] : I personally examined this patient and agree with the treatment plan.\cuong Greer

## 2021-04-30 NOTE — PHYSICAL EXAM
[Antalgic] : antalgic [UE/LE] : Sensory: Intact in bilateral upper & lower extremities [ALL] : dorsalis pedis, posterior tibial, femoral, popliteal, and radial 2+ and symmetric bilaterally [Normal RUE] : Right Upper Extremity: No scars, rashes, lesions, ulcers, skin intact [Normal LUE] : Left Upper Extremity: No scars, rashes, lesions, ulcers, skin intact [Normal RLE] : Right Lower Extremity: No scars, rashes, lesions, ulcers, skin intact [Normal LLE] : Left Lower Extremity: No scars, rashes, lesions, ulcers, skin intact [Normal] : No costovertebral angle tenderness and no spinal tenderness [de-identified] : Leg lengths appear virtually equal on the table when measured in the supine position.  Patient can flex the right hip to approximately 90 degrees with groin pain, 0 internal rotation, 15 degrees of external rotation with pain and discomfort in the groin.  Abduction 20 degrees, adduction 15 degrees with pain and discomfort in the groin.  Some pain to palpation over the right greater trochanteric region and somewhat down the iliotibial band.  Patient has full range of motion of the left hip without pain, discomfort, or instability.\par Well-healed incision to the right knee without erythema, drainage, or evidence of cellulitis.  6 degree laxity with varus valgus stresses.  Negative antiplastic drawer.  No extension lag.  No flexion contractures.  No effusion or swelling.  Range of motion 0-1 35.  Calves are soft, nontender, no evidence of DVT at this time.  Patient has good motor and sensory to both legs. [de-identified] : Intact [de-identified] : Right total knee replacement, and anatomical alignment, excellent bone to prosthesis interface, is no gross evidence of prosthetic failure, all 3 components anatomically aligned.\par \par \par Severe DJD to the right hip with multiple subchondral cyst, periarticular osteophytes, significant bone-on-bone contact acetabulum the femoral head.

## 2021-04-30 NOTE — DISCUSSION/SUMMARY
[Medication Risks Reviewed] : Medication risks reviewed [Surgical risks reviewed] : Surgical risks reviewed [de-identified] : \par Dr. Shay evaluated this patient and is guiding this patient’s entire orthopedic management plan. The patient was advised that based upon their clinical presentation and radiographic findings they meet inclusion criteria for benefitting from an Anterior approach(ASI) total hip arthroplasty as a treatment option for severe arthritis of their [right hip.\par The spectrum of treatments for severe hip DJD were reviewed in detail. Dr. Shay reviewed in detail the goals, alternatives, risks and benefits of ASI total hip arthroplasty. \par The patient was advised of the possible complications which are inclusive of but not exclusive to VTE-related, infectious-related, anesthetic-related, surgically-related, Lateral Femoral Cutaneous nerve injury-related, medically-related, and implant-related.\par The patient was advised that limb length equality may not be assured secondary to variabilities in pelvic malrotation, stable intraoperative fit, opposite side wear, and other anatomic deformities of the lower extremity.\par The patient was advised of the goals, alternatives, risks and benefits of autologous, directed and banked blood product transfusions for perioperative blood losses.\par A non-cemented type hip implant is utilized in consideration of bony integrity intraoperatively. \par The patient was educated regarding the surgical procedure steps, especially using an Hip implant and bone model, as well as steps in their hospital course and primary discharge planning for home discharge with home care and home PT. Choices for implant 's, mainly DJO and Biomet-Orlin were reviewed, with selection to be made by surgeon intraoperatively.\par The patient was advised of the goals, alternatives, risks and benefits of a 3 week course of Celebrex 200 mg BID utilized by Dr. Shay in their practice to prevent Heterotopic Ossification seen in patients post total hip arthroplasty. If this is medically contraindicated the alternative would be a single dose (800cGy) radiation treatment to the hip implant site performed pre-operatively. A consultation with the Radiation Oncologist at [facility] will then be required.\par I reviewed the plan of care as well as a model of the Hip implant equivalent to the one that will be used for the right THR. The patient agreed to the plan of care as well as the use of implants for theirright THR.\par The patient was advised that they will require a medical preoperative risk evaluation by their PCP. Further medical subspecialty clearances such as cardiac may be indicated if felt needed by their PCP.\par The patient was advised he/she may call our office after careful consideration of their treatment options and further consultation the patient was educated using models and the actual implant.  She would need medical clearance prior to surgery.  The patient would need a bedside commode and a rolling walker for use for activities of daily living status post a right anterior total hip replacement surgery.  The patient is unsure whether she wants to pursue this.  She may want to have an ultrasound-guided cortisone injection to the right hip to temporize her symptoms.  She does understand that she is not allowed to have an injection for at least 2 months prior to surgical intervention.  All of her questions were answered to her satisfaction.\par

## 2021-04-30 NOTE — HISTORY OF PRESENT ILLNESS
[de-identified] : The patient is a 75-year-old female who is well-known to this office.  She has an established diagnosis of DJD to the right hip affecting her activities of daily living.  She was seen last year and sent for an ultrasound-guided cortisone injection to the right groin which she says temporize her symptoms for several months.  The patient has an orthopedic history of having a right total knee replacement done by Dr. Jacob Reece approximately 15 years ago.  The patient states she has no pain in the right knee however intermittent swelling in and around her knee.  As for the right hip, she has difficulty standing for long periods of time, walking for long periods of time, putting her shoes and socks on and getting in and out of a car.  The patient is here to seek a definitive procedure for her right hip. [Pain Location] : pain [] : right leg [7] : a current pain level of 7/10 [5] : an average pain level of 5/10 [3] : a minimum pain level of 3/10 [9] : a maximum pain level of 9/10 [Sitting] : sitting [Standing] : standing [Daily] : ~He/She~ states the symptoms seem to be occuring daily [Bending] : worsened by bending [Direct Pressure] : worsened by direct pressure [Hip Movement] : worsened by hip movement [Lifting] : worsened by lifting [Running] : worsened by running [Walking] : worsened by walking [Acetaminophen] : relieved by acetaminophen [Exercise Regimen] : relieved by exercise regimen [Heat] : relieved by heat [Ice] : relieved by ice [NSAIDs] : relieved by nonsteroidal anti-inflammatory drugs [Physical Therapy] : relieved by physical therapy [Rest] : relieved by rest [None] : No relieving factors are noted [Ataxia] : no ataxia [Incontinence] : no incontinence [Loss of Dexterity] : good dexterity [Urinary Ret.] : no urinary retention

## 2021-05-10 ENCOUNTER — OUTPATIENT (OUTPATIENT)
Dept: OUTPATIENT SERVICES | Facility: HOSPITAL | Age: 76
LOS: 1 days | End: 2021-05-10
Payer: MEDICARE

## 2021-05-10 VITALS
RESPIRATION RATE: 16 BRPM | HEART RATE: 86 BPM | DIASTOLIC BLOOD PRESSURE: 77 MMHG | HEIGHT: 63 IN | WEIGHT: 185.19 LBS | TEMPERATURE: 96 F | SYSTOLIC BLOOD PRESSURE: 126 MMHG | OXYGEN SATURATION: 95 %

## 2021-05-10 DIAGNOSIS — Z98.89 OTHER SPECIFIED POSTPROCEDURAL STATES: Chronic | ICD-10-CM

## 2021-05-10 DIAGNOSIS — Z98.49 CATARACT EXTRACTION STATUS, UNSPECIFIED EYE: Chronic | ICD-10-CM

## 2021-05-10 DIAGNOSIS — M16.11 UNILATERAL PRIMARY OSTEOARTHRITIS, RIGHT HIP: ICD-10-CM

## 2021-05-10 DIAGNOSIS — Z01.818 ENCOUNTER FOR OTHER PREPROCEDURAL EXAMINATION: ICD-10-CM

## 2021-05-10 DIAGNOSIS — S42.409A UNSPECIFIED FRACTURE OF LOWER END OF UNSPECIFIED HUMERUS, INITIAL ENCOUNTER FOR CLOSED FRACTURE: Chronic | ICD-10-CM

## 2021-05-10 DIAGNOSIS — Z98.890 OTHER SPECIFIED POSTPROCEDURAL STATES: Chronic | ICD-10-CM

## 2021-05-10 DIAGNOSIS — Z96.651 PRESENCE OF RIGHT ARTIFICIAL KNEE JOINT: Chronic | ICD-10-CM

## 2021-05-10 DIAGNOSIS — Z95.1 PRESENCE OF AORTOCORONARY BYPASS GRAFT: Chronic | ICD-10-CM

## 2021-05-10 LAB
ALBUMIN SERPL ELPH-MCNC: 4.1 G/DL — SIGNIFICANT CHANGE UP (ref 3.3–5)
ALP SERPL-CCNC: 48 U/L — SIGNIFICANT CHANGE UP (ref 30–120)
ALT FLD-CCNC: 52 U/L DA — SIGNIFICANT CHANGE UP (ref 10–60)
ANION GAP SERPL CALC-SCNC: 9 MMOL/L — SIGNIFICANT CHANGE UP (ref 5–17)
APTT BLD: 33.1 SEC — SIGNIFICANT CHANGE UP (ref 27.5–35.5)
AST SERPL-CCNC: 41 U/L — HIGH (ref 10–40)
BILIRUB SERPL-MCNC: 0.5 MG/DL — SIGNIFICANT CHANGE UP (ref 0.2–1.2)
BLD GP AB SCN SERPL QL: SIGNIFICANT CHANGE UP
BUN SERPL-MCNC: 17 MG/DL — SIGNIFICANT CHANGE UP (ref 7–23)
CALCIUM SERPL-MCNC: 9.5 MG/DL — SIGNIFICANT CHANGE UP (ref 8.4–10.5)
CHLORIDE SERPL-SCNC: 101 MMOL/L — SIGNIFICANT CHANGE UP (ref 96–108)
CO2 SERPL-SCNC: 28 MMOL/L — SIGNIFICANT CHANGE UP (ref 22–31)
CREAT SERPL-MCNC: 0.68 MG/DL — SIGNIFICANT CHANGE UP (ref 0.5–1.3)
GLUCOSE SERPL-MCNC: 190 MG/DL — HIGH (ref 70–99)
HCT VFR BLD CALC: 35.1 % — SIGNIFICANT CHANGE UP (ref 34.5–45)
HGB BLD-MCNC: 11.6 G/DL — SIGNIFICANT CHANGE UP (ref 11.5–15.5)
INR BLD: 1.17 RATIO — HIGH (ref 0.88–1.16)
MCHC RBC-ENTMCNC: 28.2 PG — SIGNIFICANT CHANGE UP (ref 27–34)
MCHC RBC-ENTMCNC: 33 GM/DL — SIGNIFICANT CHANGE UP (ref 32–36)
MCV RBC AUTO: 85.2 FL — SIGNIFICANT CHANGE UP (ref 80–100)
NRBC # BLD: 0 /100 WBCS — SIGNIFICANT CHANGE UP (ref 0–0)
PLATELET # BLD AUTO: 152 K/UL — SIGNIFICANT CHANGE UP (ref 150–400)
POTASSIUM SERPL-MCNC: 4.1 MMOL/L — SIGNIFICANT CHANGE UP (ref 3.5–5.3)
POTASSIUM SERPL-SCNC: 4.1 MMOL/L — SIGNIFICANT CHANGE UP (ref 3.5–5.3)
PROT SERPL-MCNC: 7.4 G/DL — SIGNIFICANT CHANGE UP (ref 6–8.3)
PROTHROM AB SERPL-ACNC: 14 SEC — HIGH (ref 10.6–13.6)
RBC # BLD: 4.12 M/UL — SIGNIFICANT CHANGE UP (ref 3.8–5.2)
RBC # FLD: 13.1 % — SIGNIFICANT CHANGE UP (ref 10.3–14.5)
SODIUM SERPL-SCNC: 138 MMOL/L — SIGNIFICANT CHANGE UP (ref 135–145)
WBC # BLD: 6.37 K/UL — SIGNIFICANT CHANGE UP (ref 3.8–10.5)
WBC # FLD AUTO: 6.37 K/UL — SIGNIFICANT CHANGE UP (ref 3.8–10.5)

## 2021-05-10 PROCEDURE — G0463: CPT

## 2021-05-10 RX ORDER — ASCORBIC ACID 60 MG
0 TABLET,CHEWABLE ORAL
Qty: 0 | Refills: 0 | DISCHARGE

## 2021-05-10 RX ORDER — DEXTROSE 50 % IN WATER 50 %
12.5 SYRINGE (ML) INTRAVENOUS ONCE
Refills: 0 | Status: DISCONTINUED | OUTPATIENT
Start: 2021-05-24 | End: 2021-05-25

## 2021-05-10 RX ORDER — FEXOFENADINE HCL 30 MG
0 TABLET ORAL
Qty: 0 | Refills: 0 | DISCHARGE

## 2021-05-10 RX ORDER — DEXTROSE 50 % IN WATER 50 %
25 SYRINGE (ML) INTRAVENOUS ONCE
Refills: 0 | Status: DISCONTINUED | OUTPATIENT
Start: 2021-05-24 | End: 2021-05-25

## 2021-05-10 RX ORDER — DULAGLUTIDE 4.5 MG/.5ML
0 INJECTION, SOLUTION SUBCUTANEOUS
Qty: 0 | Refills: 0 | DISCHARGE

## 2021-05-10 RX ORDER — GLIMEPIRIDE 1 MG
2 TABLET ORAL
Qty: 0 | Refills: 0 | DISCHARGE

## 2021-05-10 RX ORDER — METOPROLOL TARTRATE 50 MG
0 TABLET ORAL
Qty: 0 | Refills: 0 | DISCHARGE

## 2021-05-10 RX ORDER — OXYBUTYNIN CHLORIDE 5 MG
0 TABLET ORAL
Qty: 0 | Refills: 0 | DISCHARGE

## 2021-05-10 RX ORDER — DEXTROSE 50 % IN WATER 50 %
15 SYRINGE (ML) INTRAVENOUS ONCE
Refills: 0 | Status: DISCONTINUED | OUTPATIENT
Start: 2021-05-24 | End: 2021-05-25

## 2021-05-10 RX ORDER — DIAZEPAM 5 MG
0 TABLET ORAL
Qty: 0 | Refills: 0 | DISCHARGE

## 2021-05-10 RX ORDER — METFORMIN HYDROCHLORIDE 850 MG/1
0 TABLET ORAL
Qty: 0 | Refills: 0 | DISCHARGE

## 2021-05-10 RX ORDER — GLUCAGON INJECTION, SOLUTION 0.5 MG/.1ML
1 INJECTION, SOLUTION SUBCUTANEOUS ONCE
Refills: 0 | Status: DISCONTINUED | OUTPATIENT
Start: 2021-05-24 | End: 2021-05-25

## 2021-05-10 RX ORDER — FUROSEMIDE 40 MG
0 TABLET ORAL
Qty: 0 | Refills: 0 | DISCHARGE

## 2021-05-10 NOTE — H&P PST ADULT - NSICDXPASTMEDICALHX_GEN_ALL_CORE_FT
PAST MEDICAL HISTORY:  Anxiety     Atypical hyperplasia of right breast     Coronary artery disease     COVID-19 vaccine series completed pfizer, completed 4/8/21    Diarrhea increased in frequency recently    Environmental allergies     Essential hypertension     Gastroesophageal reflux disease without esophagitis     H/O: pneumonia treated in August 2016 as an outpatient for pneumonia    Hearing loss left    History of vertigo last episode December 2020    Hyperlipidemia     Insomnia difficulty falling asleep    Irritable bowel     Leg swelling     Mixed stress and urge urinary incontinence     OAB (overactive bladder)     Osteoarthritis     Osteopenia     Perineal rash     Type 2 diabetes mellitus     Urinary frequency      PAST MEDICAL HISTORY:  Anxiety     Atypical hyperplasia of right breast     Coronary artery disease     COVID-19 vaccine series completed pf[employer], completed 4/8/21    Diarrhea increased in frequency recently    Environmental allergies     Essential hypertension     Gastroesophageal reflux disease without esophagitis     H/O: pneumonia treated in August 2016 as an outpatient for pneumonia    Hearing loss left    History of vertigo last episode December 2020    Hyperlipidemia     Insomnia difficulty falling asleep    Irritable bowel     Leg swelling     Mixed stress and urge urinary incontinence     OAB (overactive bladder)     Obesity, Class I, BMI 30-34.9     Osteoarthritis     Osteopenia     Perineal rash     Type 2 diabetes mellitus     Urinary frequency

## 2021-05-10 NOTE — H&P PST ADULT - NSICDXPROBLEM_GEN_ALL_CORE_FT
PROBLEM DIAGNOSES  Problem: Primary osteoarthritis of right hip  Assessment and Plan: right anterior THR. medical and cardiac clearances requested. obtain EKG and echo from cardiology. instructed to stop advil, MVI, fish oil and hair, skin and nails 1 week prior to surgery and to take omperazole and metoprolol AM of surgery with sips of water. ERAS and surgical wash instructions reviewed and verbalized understanding. Akua Hitchcock NP notified of perineal rash. covid PCR appt confirmed for 5/21/21 at 1100

## 2021-05-10 NOTE — H&P PST ADULT - NSANTHOSAYNRD_GEN_A_CORE
No. DESIRAE screening performed.  STOP BANG Legend: 0-2 = LOW Risk; 3-4 = INTERMEDIATE Risk; 5-8 = HIGH Risk neck 15 inches/No. DESIREA screening performed.  STOP BANG Legend: 0-2 = LOW Risk; 3-4 = INTERMEDIATE Risk; 5-8 = HIGH Risk

## 2021-05-10 NOTE — H&P PST ADULT - NSICDXFAMILYHX_GEN_ALL_CORE_FT
FAMILY HISTORY:  Father  Still living? No  Congestive heart failure, Age at diagnosis: Age Unknown  FH: type 2 diabetes, Age at diagnosis: Age Unknown    Mother  Still living? No  Family history of breast cancer in mother, Age at diagnosis: Age Unknown  Family history of osteoarthritis, Age at diagnosis: Age Unknown    Sibling  Still living? Yes, Estimated age: 71-80  FH: type 2 diabetes, Age at diagnosis: Age Unknown

## 2021-05-10 NOTE — H&P PST ADULT - MUSCULOSKELETAL
details… detailed exam right hip/no joint erythema/no joint warmth/no calf tenderness/decreased ROM/decreased ROM due to pain/diminished strength

## 2021-05-10 NOTE — H&P PST ADULT - HISTORY OF PRESENT ILLNESS
74 yo female presents with 1 year history of right hip and groin pain. Pain recently started to radiate to down to the knee. She received 1 cortisone injection in July 202 with temporary relief. Pain 2-3/10 at rest, increased to 5/10 when supine and 8-9/10 with prolonged activity. Ambulates with a cane. Pain mildly relieved with advil.

## 2021-05-10 NOTE — H&P PST ADULT - NSICDXPASTSURGICALHX_GEN_ALL_CORE_FT
PAST SURGICAL HISTORY:  H/O cataract extraction 2005    H/O lumpectomy right breast - benign 1993 and 2020 atypical cells    H/O mitral valve repair 2015    History of open reduction and internal fixation (ORIF) procedure right elbow 2019    History of right knee joint replacement 2005    Status post double vessel coronary artery bypass 2015

## 2021-05-11 LAB
A1C WITH ESTIMATED AVERAGE GLUCOSE RESULT: 7.6 % — HIGH (ref 4–5.6)
ESTIMATED AVERAGE GLUCOSE: 171 MG/DL — HIGH (ref 68–114)
MRSA PCR RESULT.: SIGNIFICANT CHANGE UP
S AUREUS DNA NOSE QL NAA+PROBE: SIGNIFICANT CHANGE UP

## 2021-05-18 PROBLEM — R35.0 FREQUENCY OF MICTURITION: Chronic | Status: ACTIVE | Noted: 2021-05-10

## 2021-05-18 PROBLEM — E66.9 OBESITY, UNSPECIFIED: Chronic | Status: ACTIVE | Noted: 2021-05-10

## 2021-05-18 PROBLEM — N60.91 UNSPECIFIED BENIGN MAMMARY DYSPLASIA OF RIGHT BREAST: Chronic | Status: ACTIVE | Noted: 2021-05-10

## 2021-05-18 PROBLEM — E78.5 HYPERLIPIDEMIA, UNSPECIFIED: Chronic | Status: ACTIVE | Noted: 2021-05-10

## 2021-05-18 PROBLEM — N39.46 MIXED INCONTINENCE: Chronic | Status: ACTIVE | Noted: 2021-05-10

## 2021-05-18 PROBLEM — Z87.898 PERSONAL HISTORY OF OTHER SPECIFIED CONDITIONS: Chronic | Status: ACTIVE | Noted: 2021-05-10

## 2021-05-18 PROBLEM — H91.90 UNSPECIFIED HEARING LOSS, UNSPECIFIED EAR: Chronic | Status: ACTIVE | Noted: 2021-05-10

## 2021-05-18 PROBLEM — R19.7 DIARRHEA, UNSPECIFIED: Chronic | Status: ACTIVE | Noted: 2021-05-10

## 2021-05-18 PROBLEM — M79.89 OTHER SPECIFIED SOFT TISSUE DISORDERS: Chronic | Status: ACTIVE | Noted: 2021-05-10

## 2021-05-18 PROBLEM — I25.10 ATHEROSCLEROTIC HEART DISEASE OF NATIVE CORONARY ARTERY WITHOUT ANGINA PECTORIS: Chronic | Status: ACTIVE | Noted: 2021-05-10

## 2021-05-18 PROBLEM — R21 RASH AND OTHER NONSPECIFIC SKIN ERUPTION: Chronic | Status: ACTIVE | Noted: 2021-05-10

## 2021-05-18 PROBLEM — M19.90 UNSPECIFIED OSTEOARTHRITIS, UNSPECIFIED SITE: Chronic | Status: ACTIVE | Noted: 2021-05-10

## 2021-05-18 PROBLEM — Z92.29 PERSONAL HISTORY OF OTHER DRUG THERAPY: Chronic | Status: ACTIVE | Noted: 2021-05-10

## 2021-05-18 PROBLEM — G47.00 INSOMNIA, UNSPECIFIED: Chronic | Status: ACTIVE | Noted: 2021-05-10

## 2021-05-18 PROBLEM — E11.9 TYPE 2 DIABETES MELLITUS WITHOUT COMPLICATIONS: Chronic | Status: ACTIVE | Noted: 2021-05-10

## 2021-05-21 ENCOUNTER — OUTPATIENT (OUTPATIENT)
Dept: OUTPATIENT SERVICES | Facility: HOSPITAL | Age: 76
LOS: 1 days | End: 2021-05-21

## 2021-05-21 DIAGNOSIS — Z98.890 OTHER SPECIFIED POSTPROCEDURAL STATES: Chronic | ICD-10-CM

## 2021-05-21 DIAGNOSIS — Z98.89 OTHER SPECIFIED POSTPROCEDURAL STATES: Chronic | ICD-10-CM

## 2021-05-21 DIAGNOSIS — Z98.49 CATARACT EXTRACTION STATUS, UNSPECIFIED EYE: Chronic | ICD-10-CM

## 2021-05-21 DIAGNOSIS — Z20.828 CONTACT WITH AND (SUSPECTED) EXPOSURE TO OTHER VIRAL COMMUNICABLE DISEASES: ICD-10-CM

## 2021-05-21 DIAGNOSIS — Z95.1 PRESENCE OF AORTOCORONARY BYPASS GRAFT: Chronic | ICD-10-CM

## 2021-05-21 DIAGNOSIS — Z96.651 PRESENCE OF RIGHT ARTIFICIAL KNEE JOINT: Chronic | ICD-10-CM

## 2021-05-21 LAB — SARS-COV-2 RNA SPEC QL NAA+PROBE: SIGNIFICANT CHANGE UP

## 2021-05-24 ENCOUNTER — APPOINTMENT (OUTPATIENT)
Dept: ORTHOPEDIC SURGERY | Facility: HOSPITAL | Age: 76
End: 2021-05-24

## 2021-05-24 ENCOUNTER — INPATIENT (INPATIENT)
Facility: HOSPITAL | Age: 76
LOS: 0 days | Discharge: HOME | DRG: 470 | End: 2021-05-25
Attending: ORTHOPAEDIC SURGERY | Admitting: ORTHOPAEDIC SURGERY
Payer: MEDICARE

## 2021-05-24 ENCOUNTER — RESULT REVIEW (OUTPATIENT)
Age: 76
End: 2021-05-24

## 2021-05-24 ENCOUNTER — TRANSCRIPTION ENCOUNTER (OUTPATIENT)
Age: 76
End: 2021-05-24

## 2021-05-24 VITALS
HEART RATE: 79 BPM | HEIGHT: 61 IN | SYSTOLIC BLOOD PRESSURE: 148 MMHG | RESPIRATION RATE: 14 BRPM | WEIGHT: 177.69 LBS | DIASTOLIC BLOOD PRESSURE: 67 MMHG | TEMPERATURE: 98 F | OXYGEN SATURATION: 100 %

## 2021-05-24 DIAGNOSIS — Z98.89 OTHER SPECIFIED POSTPROCEDURAL STATES: Chronic | ICD-10-CM

## 2021-05-24 DIAGNOSIS — M16.11 UNILATERAL PRIMARY OSTEOARTHRITIS, RIGHT HIP: ICD-10-CM

## 2021-05-24 DIAGNOSIS — Z98.49 CATARACT EXTRACTION STATUS, UNSPECIFIED EYE: Chronic | ICD-10-CM

## 2021-05-24 DIAGNOSIS — Z98.890 OTHER SPECIFIED POSTPROCEDURAL STATES: Chronic | ICD-10-CM

## 2021-05-24 DIAGNOSIS — Z96.651 PRESENCE OF RIGHT ARTIFICIAL KNEE JOINT: Chronic | ICD-10-CM

## 2021-05-24 DIAGNOSIS — Z95.1 PRESENCE OF AORTOCORONARY BYPASS GRAFT: Chronic | ICD-10-CM

## 2021-05-24 LAB
ABO RH CONFIRMATION: SIGNIFICANT CHANGE UP
ANION GAP SERPL CALC-SCNC: 10 MMOL/L — SIGNIFICANT CHANGE UP (ref 5–17)
BUN SERPL-MCNC: 13 MG/DL — SIGNIFICANT CHANGE UP (ref 7–23)
CALCIUM SERPL-MCNC: 8.8 MG/DL — SIGNIFICANT CHANGE UP (ref 8.4–10.5)
CHLORIDE SERPL-SCNC: 102 MMOL/L — SIGNIFICANT CHANGE UP (ref 96–108)
CO2 SERPL-SCNC: 27 MMOL/L — SIGNIFICANT CHANGE UP (ref 22–31)
CREAT SERPL-MCNC: 0.72 MG/DL — SIGNIFICANT CHANGE UP (ref 0.5–1.3)
GLUCOSE BLDC GLUCOMTR-MCNC: 189 MG/DL — HIGH (ref 70–99)
GLUCOSE BLDC GLUCOMTR-MCNC: 215 MG/DL — HIGH (ref 70–99)
GLUCOSE BLDC GLUCOMTR-MCNC: 318 MG/DL — HIGH (ref 70–99)
GLUCOSE SERPL-MCNC: 231 MG/DL — HIGH (ref 70–99)
HCT VFR BLD CALC: 33 % — LOW (ref 34.5–45)
HGB BLD-MCNC: 10.9 G/DL — LOW (ref 11.5–15.5)
INR BLD: 1.24 RATIO — HIGH (ref 0.88–1.16)
POTASSIUM SERPL-MCNC: 4.9 MMOL/L — SIGNIFICANT CHANGE UP (ref 3.5–5.3)
POTASSIUM SERPL-SCNC: 4.9 MMOL/L — SIGNIFICANT CHANGE UP (ref 3.5–5.3)
PROTHROM AB SERPL-ACNC: 14.9 SEC — HIGH (ref 10.6–13.6)
SODIUM SERPL-SCNC: 139 MMOL/L — SIGNIFICANT CHANGE UP (ref 135–145)

## 2021-05-24 PROCEDURE — 88305 TISSUE EXAM BY PATHOLOGIST: CPT | Mod: 26

## 2021-05-24 PROCEDURE — 99222 1ST HOSP IP/OBS MODERATE 55: CPT

## 2021-05-24 PROCEDURE — 27130 TOTAL HIP ARTHROPLASTY: CPT | Mod: RT

## 2021-05-24 PROCEDURE — 88311 DECALCIFY TISSUE: CPT | Mod: 26

## 2021-05-24 RX ORDER — MAGNESIUM HYDROXIDE 400 MG/1
30 TABLET, CHEWABLE ORAL DAILY
Refills: 0 | Status: DISCONTINUED | OUTPATIENT
Start: 2021-05-24 | End: 2021-05-25

## 2021-05-24 RX ORDER — DIAZEPAM 5 MG
5 TABLET ORAL AT BEDTIME
Refills: 0 | Status: DISCONTINUED | OUTPATIENT
Start: 2021-05-24 | End: 2021-05-25

## 2021-05-24 RX ORDER — SODIUM CHLORIDE 9 MG/ML
500 INJECTION INTRAMUSCULAR; INTRAVENOUS; SUBCUTANEOUS ONCE
Refills: 0 | Status: COMPLETED | OUTPATIENT
Start: 2021-05-24 | End: 2021-05-24

## 2021-05-24 RX ORDER — ONDANSETRON 8 MG/1
4 TABLET, FILM COATED ORAL ONCE
Refills: 0 | Status: DISCONTINUED | OUTPATIENT
Start: 2021-05-24 | End: 2021-05-24

## 2021-05-24 RX ORDER — SODIUM CHLORIDE 9 MG/ML
1000 INJECTION, SOLUTION INTRAVENOUS
Refills: 0 | Status: DISCONTINUED | OUTPATIENT
Start: 2021-05-24 | End: 2021-05-25

## 2021-05-24 RX ORDER — DEXTROSE 50 % IN WATER 50 %
12.5 SYRINGE (ML) INTRAVENOUS ONCE
Refills: 0 | Status: DISCONTINUED | OUTPATIENT
Start: 2021-05-24 | End: 2021-05-25

## 2021-05-24 RX ORDER — FLUOXETINE HCL 10 MG
20 CAPSULE ORAL DAILY
Refills: 0 | Status: DISCONTINUED | OUTPATIENT
Start: 2021-05-24 | End: 2021-05-25

## 2021-05-24 RX ORDER — MECLIZINE HCL 12.5 MG
12.5 TABLET ORAL EVERY 6 HOURS
Refills: 0 | Status: DISCONTINUED | OUTPATIENT
Start: 2021-05-24 | End: 2021-05-25

## 2021-05-24 RX ORDER — CHLORHEXIDINE GLUCONATE 213 G/1000ML
1 SOLUTION TOPICAL ONCE
Refills: 0 | Status: COMPLETED | OUTPATIENT
Start: 2021-05-24 | End: 2021-05-24

## 2021-05-24 RX ORDER — OXYCODONE HYDROCHLORIDE 5 MG/1
5 TABLET ORAL
Refills: 0 | Status: DISCONTINUED | OUTPATIENT
Start: 2021-05-24 | End: 2021-05-25

## 2021-05-24 RX ORDER — OMEGA-3 ACID ETHYL ESTERS 1 G
2 CAPSULE ORAL
Qty: 0 | Refills: 0 | DISCHARGE

## 2021-05-24 RX ORDER — OXYCODONE HYDROCHLORIDE 5 MG/1
10 TABLET ORAL
Refills: 0 | Status: DISCONTINUED | OUTPATIENT
Start: 2021-05-24 | End: 2021-05-25

## 2021-05-24 RX ORDER — SODIUM CHLORIDE 9 MG/ML
1000 INJECTION, SOLUTION INTRAVENOUS
Refills: 0 | Status: DISCONTINUED | OUTPATIENT
Start: 2021-05-24 | End: 2021-05-24

## 2021-05-24 RX ORDER — ACETAMINOPHEN 500 MG
1000 TABLET ORAL ONCE
Refills: 0 | Status: COMPLETED | OUTPATIENT
Start: 2021-05-24 | End: 2021-05-24

## 2021-05-24 RX ORDER — SENNA PLUS 8.6 MG/1
2 TABLET ORAL AT BEDTIME
Refills: 0 | Status: DISCONTINUED | OUTPATIENT
Start: 2021-05-24 | End: 2021-05-25

## 2021-05-24 RX ORDER — PANTOPRAZOLE SODIUM 20 MG/1
40 TABLET, DELAYED RELEASE ORAL
Refills: 0 | Status: DISCONTINUED | OUTPATIENT
Start: 2021-05-24 | End: 2021-05-25

## 2021-05-24 RX ORDER — ATORVASTATIN CALCIUM 80 MG/1
40 TABLET, FILM COATED ORAL AT BEDTIME
Refills: 0 | Status: DISCONTINUED | OUTPATIENT
Start: 2021-05-24 | End: 2021-05-25

## 2021-05-24 RX ORDER — ASPIRIN/CALCIUM CARB/MAGNESIUM 324 MG
1 TABLET ORAL
Qty: 83 | Refills: 0
Start: 2021-05-24 | End: 2021-07-04

## 2021-05-24 RX ORDER — VANCOMYCIN HCL 1 G
1250 VIAL (EA) INTRAVENOUS ONCE
Refills: 0 | Status: COMPLETED | OUTPATIENT
Start: 2021-05-24 | End: 2021-05-24

## 2021-05-24 RX ORDER — DEXTROSE 50 % IN WATER 50 %
25 SYRINGE (ML) INTRAVENOUS ONCE
Refills: 0 | Status: DISCONTINUED | OUTPATIENT
Start: 2021-05-24 | End: 2021-05-25

## 2021-05-24 RX ORDER — POLYETHYLENE GLYCOL 3350 17 G/17G
17 POWDER, FOR SOLUTION ORAL AT BEDTIME
Refills: 0 | Status: DISCONTINUED | OUTPATIENT
Start: 2021-05-24 | End: 2021-05-25

## 2021-05-24 RX ORDER — FENOFIBRATE,MICRONIZED 130 MG
145 CAPSULE ORAL DAILY
Refills: 0 | Status: DISCONTINUED | OUTPATIENT
Start: 2021-05-24 | End: 2021-05-25

## 2021-05-24 RX ORDER — APREPITANT 80 MG/1
40 CAPSULE ORAL ONCE
Refills: 0 | Status: DISCONTINUED | OUTPATIENT
Start: 2021-05-24 | End: 2021-05-24

## 2021-05-24 RX ORDER — CELECOXIB 200 MG/1
1 CAPSULE ORAL
Qty: 60 | Refills: 0
Start: 2021-05-24 | End: 2021-06-22

## 2021-05-24 RX ORDER — METOPROLOL TARTRATE 50 MG
50 TABLET ORAL DAILY
Refills: 0 | Status: DISCONTINUED | OUTPATIENT
Start: 2021-05-25 | End: 2021-05-25

## 2021-05-24 RX ORDER — GLUCAGON INJECTION, SOLUTION 0.5 MG/.1ML
1 INJECTION, SOLUTION SUBCUTANEOUS ONCE
Refills: 0 | Status: DISCONTINUED | OUTPATIENT
Start: 2021-05-24 | End: 2021-05-25

## 2021-05-24 RX ORDER — DIPHENOXYLATE HCL/ATROPINE 2.5-.025MG
2 TABLET ORAL
Refills: 0 | Status: DISCONTINUED | OUTPATIENT
Start: 2021-05-24 | End: 2021-05-25

## 2021-05-24 RX ORDER — CELECOXIB 200 MG/1
100 CAPSULE ORAL EVERY 12 HOURS
Refills: 0 | Status: DISCONTINUED | OUTPATIENT
Start: 2021-05-24 | End: 2021-05-25

## 2021-05-24 RX ORDER — ACETAMINOPHEN 500 MG
1000 TABLET ORAL ONCE
Refills: 0 | Status: COMPLETED | OUTPATIENT
Start: 2021-05-24 | End: 2021-05-25

## 2021-05-24 RX ORDER — ACETAMINOPHEN 500 MG
1000 TABLET ORAL EVERY 8 HOURS
Refills: 0 | Status: DISCONTINUED | OUTPATIENT
Start: 2021-05-25 | End: 2021-05-25

## 2021-05-24 RX ORDER — ASPIRIN/CALCIUM CARB/MAGNESIUM 324 MG
81 TABLET ORAL EVERY 12 HOURS
Refills: 0 | Status: DISCONTINUED | OUTPATIENT
Start: 2021-05-25 | End: 2021-05-25

## 2021-05-24 RX ORDER — INSULIN LISPRO 100/ML
VIAL (ML) SUBCUTANEOUS
Refills: 0 | Status: DISCONTINUED | OUTPATIENT
Start: 2021-05-24 | End: 2021-05-25

## 2021-05-24 RX ORDER — HYDROMORPHONE HYDROCHLORIDE 2 MG/ML
0.5 INJECTION INTRAMUSCULAR; INTRAVENOUS; SUBCUTANEOUS ONCE
Refills: 0 | Status: DISCONTINUED | OUTPATIENT
Start: 2021-05-24 | End: 2021-05-25

## 2021-05-24 RX ORDER — TRANEXAMIC ACID 100 MG/ML
1000 INJECTION, SOLUTION INTRAVENOUS ONCE
Refills: 0 | Status: COMPLETED | OUTPATIENT
Start: 2021-05-24 | End: 2021-05-24

## 2021-05-24 RX ORDER — NYSTATIN CREAM 100000 [USP'U]/G
1 CREAM TOPICAL
Refills: 0 | Status: DISCONTINUED | OUTPATIENT
Start: 2021-05-24 | End: 2021-05-25

## 2021-05-24 RX ORDER — DEXTROSE 50 % IN WATER 50 %
15 SYRINGE (ML) INTRAVENOUS ONCE
Refills: 0 | Status: DISCONTINUED | OUTPATIENT
Start: 2021-05-24 | End: 2021-05-25

## 2021-05-24 RX ORDER — OMEPRAZOLE 10 MG/1
1 CAPSULE, DELAYED RELEASE ORAL
Qty: 30 | Refills: 1
Start: 2021-05-24 | End: 2021-07-22

## 2021-05-24 RX ORDER — HYDROMORPHONE HYDROCHLORIDE 2 MG/ML
0.5 INJECTION INTRAMUSCULAR; INTRAVENOUS; SUBCUTANEOUS
Refills: 0 | Status: DISCONTINUED | OUTPATIENT
Start: 2021-05-24 | End: 2021-05-24

## 2021-05-24 RX ORDER — METFORMIN HYDROCHLORIDE 850 MG/1
500 TABLET ORAL
Refills: 0 | Status: DISCONTINUED | OUTPATIENT
Start: 2021-05-25 | End: 2021-05-25

## 2021-05-24 RX ADMIN — CHLORHEXIDINE GLUCONATE 1 APPLICATION(S): 213 SOLUTION TOPICAL at 08:59

## 2021-05-24 RX ADMIN — CELECOXIB 100 MILLIGRAM(S): 200 CAPSULE ORAL at 21:24

## 2021-05-24 RX ADMIN — Medication 166.67 MILLIGRAM(S): at 17:53

## 2021-05-24 RX ADMIN — SODIUM CHLORIDE 500 MILLILITER(S): 9 INJECTION INTRAMUSCULAR; INTRAVENOUS; SUBCUTANEOUS at 13:45

## 2021-05-24 RX ADMIN — Medication 400 MILLIGRAM(S): at 19:41

## 2021-05-24 RX ADMIN — Medication 4: at 22:40

## 2021-05-24 RX ADMIN — Medication 1000 MILLIGRAM(S): at 20:02

## 2021-05-24 RX ADMIN — SODIUM CHLORIDE 100 MILLILITER(S): 9 INJECTION, SOLUTION INTRAVENOUS at 14:24

## 2021-05-24 RX ADMIN — Medication 166.67 MILLIGRAM(S): at 08:59

## 2021-05-24 RX ADMIN — CELECOXIB 100 MILLIGRAM(S): 200 CAPSULE ORAL at 21:38

## 2021-05-24 RX ADMIN — ATORVASTATIN CALCIUM 40 MILLIGRAM(S): 80 TABLET, FILM COATED ORAL at 21:08

## 2021-05-24 RX ADMIN — SODIUM CHLORIDE 500 MILLILITER(S): 9 INJECTION INTRAMUSCULAR; INTRAVENOUS; SUBCUTANEOUS at 20:03

## 2021-05-24 NOTE — CONSULT NOTE ADULT - SUBJECTIVE AND OBJECTIVE BOX
HPI:  75F CAD, HTN, HLD, DM 2, Anxiety, and OA has been combatting pain in right hip for several years which has progressively worsened.  Patient has tried multiple options for pain relief including OTC medication and as well as PT with minimal relief and has undergone elective replacement of right hip successfully.  She is currently resting in bed comfortable with good pain control.     REVIEW OF SYSTEMS:  CONSTITUTIONAL: No fever, weight loss, or fatigue  EYES: No eye pain, visual disturbances, or discharge  ENMT:  No difficulty hearing, tinnitus, vertigo; No sinus or throat pain  NECK: No pain or stiffness  RESPIRATORY: No cough, wheezing, chills or hemoptysis; No shortness of breath  CARDIOVASCULAR: No chest pain, palpitations, dizziness, or leg swelling  GASTROINTESTINAL: No abdominal or epigastric pain. No nausea, vomiting, or hematemesis; No diarrhea or constipation. No melena or hematochezia.  GENITOURINARY: No dysuria, frequency, hematuria, or incontinence  NEUROLOGICAL: No headaches, memory loss, loss of strength, numbness, or tremors  MUSCULOSKELETAL: No muscle or back pain      PAST MEDICAL & SURGICAL HISTORY:  Essential hypertension    Anxiety    Environmental allergies    Gastroesophageal reflux disease without esophagitis    H/O: pneumonia  treated in August 2016 as an outpatient for pneumonia    OAB (overactive bladder)    Osteopenia    Irritable bowel    COVID-19 vaccine series completed  pfizer, completed 4/8/21    Type 2 diabetes mellitus    Osteoarthritis    Hyperlipidemia    Coronary artery disease    Urinary frequency    Mixed stress and urge urinary incontinence    Insomnia  difficulty falling asleep    Diarrhea  increased in frequency recently    Perineal rash    History of vertigo  last episode December 2020    Leg swelling    Atypical hyperplasia of right breast    Hearing loss  left    Obesity, Class I, BMI 30-34.9    H/O cataract extraction  2005    History of right knee joint replacement  2005    H/O lumpectomy  right breast - benign 1993 and 2020 atypical cells    Status post double vessel coronary artery bypass  2015    H/O mitral valve repair  2015    History of open reduction and internal fixation (ORIF) procedure  right elbow 2019        SOCIAL HISTORY:  Negative Tobacco; Wine monthly; Negative Illicit Drugs    Allergies    amoxicillin (Pruritus)  erythromycin (Pruritus)    Intolerances        MEDICATIONS  (STANDING):  lactated ringers. 1000 milliLiter(s) (75 mL/Hr) IV Continuous <Continuous>  lactated ringers. 1000 milliLiter(s) (100 mL/Hr) IV Continuous <Continuous>    MEDICATIONS  (PRN):  HYDROmorphone  Injectable 0.5 milliGRAM(s) IV Push every 10 minutes PRN Moderate Pain (4 - 6)  ondansetron Injectable 4 milliGRAM(s) IV Push once PRN Nausea and/or Vomiting      FAMILY HISTORY:  Congestive heart failure (Father)    Family history of breast cancer in mother (Mother)  grandmother and aunt    FH: type 2 diabetes (Father, Sibling)    Family history of osteoarthritis (Mother)        Vital Signs Last 24 Hrs  T(C): 36.8 (24 May 2021 13:24), Max: 36.8 (24 May 2021 08:37)  T(F): 98.2 (24 May 2021 13:24), Max: 98.3 (24 May 2021 08:37)  HR: 74 (24 May 2021 14:30) (59 - 79)  BP: 100/56 (24 May 2021 14:30) (99/57 - 148/67)  BP(mean): 64 (24 May 2021 14:30) (60 - 68)  RR: 18 (24 May 2021 14:30) (13 - 18)  SpO2: 96% (24 May 2021 14:30) (94% - 100%)    PHYSICAL EXAM:    GENERAL: NAD, well-developed  HEAD:  Atraumatic, Normocephalic  EYES: EOMI, PERRLA, conjunctiva and sclera clear  ENMT: No tonsillar erythema, exudates, or enlargement; Moist mucous membranes, Good dentition, No lesions  NECK: Supple, No JVD, Normal thyroid  NERVOUS SYSTEM:  Alert & Oriented X3, Good concentration;  CHEST/LUNG: Clear to auscultation bilaterally; No rales, rhonchi, wheezing, or rubs  HEART: Regular rate and rhythm; No murmurs, rubs, or gallops  ABDOMEN: Soft, Nontender, Nondistended; Bowel sounds present  EXTREMITIES:  2+ Peripheral Pulses, No clubbing, cyanosis, or edema      LABS:          PT/INR - ( 24 May 2021 08:39 )   PT: 14.9 sec;   INR: 1.24 ratio             CAPILLARY BLOOD GLUCOSE      POCT Blood Glucose.: 189 mg/dL (24 May 2021 09:09)      RADIOLOGY & ADDITIONAL STUDIES:    EKG:   Personally Reviewed:  [ ] YES     Imaging:   Personally Reviewed:  [ ] YES     Consultant(s) Notes Reviewed:      Care Discussed with Consultants/Other Providers:

## 2021-05-24 NOTE — BRIEF OPERATIVE NOTE - NSICDXBRIEFPOSTOP_GEN_ALL_CORE_FT
POST-OP DIAGNOSIS:  Primary localized osteoarthritis of right hip 24-May-2021 13:03:54  Andrés Crespo

## 2021-05-24 NOTE — DISCHARGE NOTE PROVIDER - NSDCCPCAREPLAN_GEN_ALL_CORE_FT
PRINCIPAL DISCHARGE DIAGNOSIS  Diagnosis: Primary osteoarthritis of right hip  Assessment and Plan of Treatment: You have had an Anterior Total Hip Replacement. Follow instructions given to you by your surgeon.   PT/OT Total Hip Protocol; Ambulation, transfers, stairs, & ADLs  Full weight bearing both legs; Walker/cane use as instructed by PT/OT  Anterior THR precautions for 4 weeks: No straight leg raise; No external rotation of hip when extended-standing or lying flat; No hyperextension of hip when standing (kickback)  • Ice 20 minutes several times daily with at least a 20 minute break in between icing sessions  You have a ABIOLA dressing. You may shower. Disconnect ABIOLA battery prior to showering. Reconnect battery after showering and press orange button to resume ABIOLA power. Remove ABIOLA dressing on post-op day #7.  Keep incision clean. DO NOT APPLY ANYTHING to incision site (salves/ointments/creams). Do not scrub incision site. Pat dry after shower.  Suture removal 2 weeks after surgery at Surgeon's office.         PRINCIPAL DISCHARGE DIAGNOSIS  Diagnosis: Primary osteoarthritis of right hip  Assessment and Plan of Treatment: You have had an Anterior Total Hip Replacement. Follow instructions given to you by your surgeon.   PT/OT Total Hip Protocol; Ambulation, transfers, stairs, & ADLs  Full weight bearing both legs; Walker/cane use as instructed by PT/OT  Anterior THR precautions for 4 weeks: No straight leg raise; No external rotation of hip when extended-standing or lying flat; No hyperextension of hip when standing (kickback)  • Ice 20 minutes several times daily with at least a 20 minute break in between icing sessions  [place] dressing is over your hip wound.  It is waterproof and you may shower.  Do not aim shower stream at surgical site and pat dry with clean towel after showering.   Remove [place] dressing 7 days after surgery and leave wound open to air.   Keep incision clean. DO NOT APPLY ANYTHING to incision site (salves/ointments/creams). Do not scrub incision site. Pat dry after shower.  Prineo removal 2 weeks after surgery at Surgeon's office.

## 2021-05-24 NOTE — DISCHARGE NOTE PROVIDER - HOSPITAL COURSE
This patient was admitted to [facility] with a history of severe degenerative joint disease of the right hip.  Patient underwent Pre-Surgical Testing and was medically cleared to undergo elective procedure. Patient underwent right anterior THR by Dr. Adolph Shay on 5/24/22  . Procedure was well tolerated.  No operative or buddy-operative complications arose during patient's hospital course.  Patient received antibiotic according to SCIP guidelines for infection prevention.  Aspirin 81mg q 12h was given for DVT prophylaxis, in addition to the use of SCDs.  Anesthesia, Medical Hospitalist, Physical Therapy and Occupational Therapy were consulted. Patient is stable for discharge with a good prognosis.  Appropriate discharge instructions and medications are provided in this document.

## 2021-05-24 NOTE — PHYSICAL THERAPY INITIAL EVALUATION ADULT - GAIT DEVIATIONS NOTED, PT EVAL
bilateral LE buckling/decreased jackie/decreased velocity of limb motion/decreased step length/decreased stride length/decreased weight-shifting ability

## 2021-05-24 NOTE — PHYSICAL THERAPY INITIAL EVALUATION ADULT - MANUAL MUSCLE TESTING RESULTS, REHAB EVAL
BLEs grossly 4/5/grossly assessed due to BLEs grossly/grossly assessed due to BLEs grossly 3+/5/grossly assessed due to

## 2021-05-24 NOTE — PHYSICAL THERAPY INITIAL EVALUATION ADULT - GENERAL OBSERVATIONS, REHAB EVAL
pt received semisupine in bed, +IV, +SCDs pt received semisupine in bed, +IV, +SCDs, pink foam wedges on right LE

## 2021-05-24 NOTE — PHYSICAL THERAPY INITIAL EVALUATION ADULT - GAIT TRAINING, PT EVAL
Goals 1-3 days, Pt will ambulate 150 ft w/ rolling walker independently Goals 1-3 days, Pt will ambulate 150 ft w/ rolling walker independently.   Pt will negotiate 5 steps with rail and straight cane with supervision

## 2021-05-24 NOTE — DISCHARGE NOTE PROVIDER - NSDCFUSCHEDAPPT_GEN_ALL_CORE_FT
SAURAV QUINONES ; 06/07/2021 ; [facility] [place] [facility] [address] [facility]  SAURAV QUINONES ; 06/17/2021 ; [facility]  SAURAV QUINONES ; 07/16/2021 ; P [facility] [address] [facility]

## 2021-05-24 NOTE — DISCHARGE NOTE PROVIDER - CARE PROVIDER_API CALL
Adolph Shay)  Orthopedics  [address] [facility], [address]  [place] [address]  Phone: [phone]  Fax: [phone]  Established Patient  Scheduled Appointment: 06/07/2021 09:40 AM

## 2021-05-24 NOTE — DISCHARGE NOTE PROVIDER - PROVIDER TOKENS
PROVIDER:[TOKEN:[[id number]],SCHEDULEDAPPT:[06/07/2021],SCHEDULEDAPPTTIME:[09:40 AM],ESTABLISHEDPATIENT:[T]]

## 2021-05-24 NOTE — CONSULT NOTE ADULT - ASSESSMENT
75F CAD, HTN, HLD, DM 2, Anxiety, and OA admitted for aftercare following Right THR.     S/P Right THR  POD 0  Continue Bowel and pain control regimen.   Incentive Spirometer for lung expansion.  Work with PT to increase ambulation as per orthopedics.  Monitor Hgb and follow up electrolytes.   Orthopedics on board and following     CAD / HTN / HLD   Aspirin + Metoprolol + Furosemide + Atorvastatin     DM 2  Hold Oral Agents and Trulicity   ISS and control BS <180    Anxiety  Fluoxetine + Diazepam     Diet  Regular    DVT Prophylaxis  Eliquis     Disposition  Full Code/Inpatient  Discharge planning pending hospital course

## 2021-05-24 NOTE — DISCHARGE NOTE PROVIDER - NSDCMRMEDTOKEN_GEN_ALL_CORE_FT
Advil 200 mg oral tablet: 2 tab(s) orally every 6 hours, As Needed - for severe pain  Align 4 mg oral capsule: 1 cap(s) orally once a day  Allegra D OTC 24HR 180 mg-240 mg oral tablet, extended release: 1 tab(s) orally once a day  aspirin 81 mg oral delayed release tablet: 1 tab orally every 12 hours. Take 2 hours before Celebrex.   Astelin 137 mcg/inh nasal spray: 2 spray(s) nasal 2 times a day  atorvastatin 40 mg oral tablet: 1 tab(s) orally once a day (at bedtime)  celecoxib 100 mg oral capsule: 1 cap orally every 12 hours. Take 2 hours after Aspirin.    diazePAM 5 mg oral tablet: 1 tab(s) orally once a day (at bedtime)  diphenoxylate-atropine 2.5 mg-0.025 mg oral tablet: 2 tab(s) orally 4 times a day, As Needed - for diarrhea  Ecotrin Adult Low Strength 81 mg oral delayed release tablet: 1 tab(s) orally once a day  fenofibrate 160 mg oral tablet: 1 tab(s) orally once a day  Flonase 50 mcg/inh nasal spray: 1 spray(s) nasal 2 times a day  FLUoxetine 20 mg oral capsule: 1 cap(s) orally once a day  furosemide 40 mg oral tablet: 1 tab(s) orally once a day  glimepiride 4 mg oral tablet: 2 tab(s) orally once a day  Hair, Skin &amp; Nails 5 mg oral capsule: 1 cap(s) orally once a day  meclizine 12.5 mg oral tablet: orally 1 to 4 times a day, As Needed  metFORMIN 500 mg oral tablet: 1 tab(s) orally 2 times a day  Metoprolol Succinate ER 50 mg oral tablet, extended release: 3 tab(s) orally once a day  Multiple Vitamins oral tablet: 1 tab(s) orally once a day  nystatin 100,000 units/g topical cream: Apply topically to affected area 3 times a day  omeprazole 20 mg oral delayed release capsule: 1 cap orally once a day   omeprazole 20 mg oral delayed release capsule: 1 cap(s) orally once a day  oxybutynin 10 mg/24 hr oral tablet, extended release: 1 tab(s) orally once a day  Trulicity Pen 1.5 mg/0.5 mL subcutaneous solution: 1 dose(s) subcutaneous once a week  Monday  Vitamin C 500 mg oral tablet: 2 tab(s) orally once a day  Vitamin D3 2000 intl units (50 mcg) oral tablet: 1 tab(s) orally once a day   acetaminophen 500 mg oral tablet: 2 tab(s) orally every 8 hours  Advil 200 mg oral tablet: 2 tab(s) orally every 6 hours, As Needed - for severe pain  Align 4 mg oral capsule: 1 cap(s) orally once a day  Allegra D OTC 24HR 180 mg-240 mg oral tablet, extended release: 1 tab(s) orally once a day  aspirin 81 mg oral delayed release tablet: 1 tab orally every 12 hours. Take 2 hours before Celebrex.   Astelin 137 mcg/inh nasal spray: 2 spray(s) nasal 2 times a day  atorvastatin 40 mg oral tablet: 1 tab(s) orally once a day (at bedtime)  celecoxib 100 mg oral capsule: 1 cap orally every 12 hours. Take 2 hours after Aspirin.    diazePAM 5 mg oral tablet: 1 tab(s) orally once a day (at bedtime)  diphenoxylate-atropine 2.5 mg-0.025 mg oral tablet: 2 tab(s) orally 4 times a day, As Needed - for diarrhea  Ecotrin Adult Low Strength 81 mg oral delayed release tablet: 1 tab(s) orally once a day  fenofibrate 160 mg oral tablet: 1 tab(s) orally once a day  Flonase 50 mcg/inh nasal spray: 1 spray(s) nasal 2 times a day  FLUoxetine 20 mg oral capsule: 1 cap(s) orally once a day  furosemide 40 mg oral tablet: 1 tab(s) orally once a day  glimepiride 4 mg oral tablet: 2 tab(s) orally once a day  Hair, Skin &amp; Nails 5 mg oral capsule: 1 cap(s) orally once a day  meclizine 12.5 mg oral tablet: orally 1 to 4 times a day, As Needed  metFORMIN 500 mg oral tablet: 1 tab(s) orally 2 times a day  Metoprolol Succinate ER 50 mg oral tablet, extended release: 3 tab(s) orally once a day  Multiple Vitamins oral tablet: 1 tab(s) orally once a day  nystatin 100,000 units/g topical cream: Apply topically to affected area 3 times a day  omeprazole 20 mg oral delayed release capsule: 1 cap orally once a day   omeprazole 20 mg oral delayed release capsule: 1 cap(s) orally once a day  oxybutynin 10 mg/24 hr oral tablet, extended release: 1 tab(s) orally once a day  oxyCODONE 5 mg oral tablet: 1-2  tab(s) orally every 4 hours, As Needed -Moderate to Severe Pain    Reference #: [id number] MDD:6  polyethylene glycol 3350 oral powder for reconstitution: 17 gram(s) orally once a day (at bedtime), As Needed - for constipation  senna oral tablet: 2 tab(s) orally once a day (at bedtime), As Needed - for constipation  Trulicity Pen 1.5 mg/0.5 mL subcutaneous solution: 1 dose(s) subcutaneous once a week  Monday  Vitamin C 500 mg oral tablet: 2 tab(s) orally once a day  Vitamin D3 2000 intl units (50 mcg) oral tablet: 1 tab(s) orally once a day   acetaminophen 500 mg oral tablet: 2 tab(s) orally every 8 hours  Align 4 mg oral capsule: 1 cap(s) orally once a day  Allegra D OTC 24HR 180 mg-240 mg oral tablet, extended release: 1 tab(s) orally once a day  apixaban 2.5 mg oral tablet: 1 tab(s) orally every 12 hours  apixaban 2.5 mg oral tablet: 1 tab(s) orally every 12 hours  aspirin 81 mg oral delayed release tablet: 1 tab(s) orally once a day. Take at least 2 hours before celebrex  aspirin 81 mg oral delayed release tablet: 1 tab orally every 12 hours. Take 2 hours before Celebrex.   Astelin 137 mcg/inh nasal spray: 2 spray(s) nasal 2 times a day  atorvastatin 40 mg oral tablet: 1 tab(s) orally once a day (at bedtime)  celecoxib 100 mg oral capsule: 1 cap orally every 12 hours. Take 2 hours after Aspirin.    diazePAM 5 mg oral tablet: 1 tab(s) orally once a day (at bedtime)  diphenoxylate-atropine 2.5 mg-0.025 mg oral tablet: 2 tab(s) orally 4 times a day, As Needed - for diarrhea  fenofibrate 160 mg oral tablet: 1 tab(s) orally once a day  Flonase 50 mcg/inh nasal spray: 1 spray(s) nasal 2 times a day  FLUoxetine 20 mg oral capsule: 1 cap(s) orally once a day  furosemide 40 mg oral tablet: 1 tab(s) orally once a day  glimepiride 4 mg oral tablet: 2 tab(s) orally once a day  Hair, Skin &amp; Nails 5 mg oral capsule: 1 cap(s) orally once a day  meclizine 12.5 mg oral tablet: orally 1 to 4 times a day, As Needed  metFORMIN 500 mg oral tablet: 1 tab(s) orally 2 times a day  Metoprolol Succinate ER 50 mg oral tablet, extended release: 150 milligram(s) orally once a day  Multiple Vitamins oral tablet: 1 tab(s) orally once a day  nystatin 100,000 units/g topical cream: Apply topically to affected area 3 times a day  omeprazole 20 mg oral delayed release capsule: 1 cap orally once a day   oxybutynin 10 mg/24 hr oral tablet, extended release: 1 tab(s) orally once a day  oxyCODONE 5 mg oral tablet: 1-2  tab(s) orally every 4 hours, As Needed -Moderate to Severe Pain    Reference #: [id number] MDD:6  polyethylene glycol 3350 oral powder for reconstitution: 17 gram(s) orally once a day (at bedtime), As Needed - for constipation  senna oral tablet: 2 tab(s) orally once a day (at bedtime), As Needed - for constipation  Trulicity Pen 1.5 mg/0.5 mL subcutaneous solution: 1 dose(s) subcutaneous once a week  Monday  Vitamin C 500 mg oral tablet: 2 tab(s) orally once a day  Vitamin D3 2000 intl units (50 mcg) oral tablet: 1 tab(s) orally once a day   acetaminophen 500 mg oral tablet: 2 tab(s) orally every 8 hours  Align 4 mg oral capsule: 1 cap(s) orally once a day  Allegra D OTC 24HR 180 mg-240 mg oral tablet, extended release: 1 tab(s) orally once a day  apixaban 2.5 mg oral tablet: 1 tab(s) orally every 12 hours  apixaban 2.5 mg oral tablet: 1 tab(s) orally every 12 hours  aspirin 81 mg oral delayed release tablet: 1 tab(s) orally once a day. Take at least 2 hours before celebrex  Astelin 137 mcg/inh nasal spray: 2 spray(s) nasal 2 times a day  atorvastatin 40 mg oral tablet: 1 tab(s) orally once a day (at bedtime)  celecoxib 100 mg oral capsule: 1 cap orally every 12 hours. Take 2 hours after Aspirin.    diazePAM 5 mg oral tablet: 1 tab(s) orally once a day (at bedtime)  diphenoxylate-atropine 2.5 mg-0.025 mg oral tablet: 2 tab(s) orally 4 times a day, As Needed - for diarrhea  fenofibrate 160 mg oral tablet: 1 tab(s) orally once a day  Flonase 50 mcg/inh nasal spray: 1 spray(s) nasal 2 times a day  FLUoxetine 20 mg oral capsule: 1 cap(s) orally once a day  furosemide 40 mg oral tablet: 1 tab(s) orally once a day  glimepiride 4 mg oral tablet: 2 tab(s) orally once a day  Hair, Skin &amp; Nails 5 mg oral capsule: 1 cap(s) orally once a day  meclizine 12.5 mg oral tablet: orally 1 to 4 times a day, As Needed  metFORMIN 500 mg oral tablet: 1 tab(s) orally 2 times a day  Multiple Vitamins oral tablet: 1 tab(s) orally once a day  nystatin 100,000 units/g topical cream: Apply topically to affected area 3 times a day  omeprazole 20 mg oral delayed release capsule: 1 cap orally once a day   oxybutynin 10 mg/24 hr oral tablet, extended release: 1 tab(s) orally once a day  oxyCODONE 5 mg oral tablet: 1-2  tab(s) orally every 4 hours, As Needed -Moderate to Severe Pain    Reference #: [id number] MDD:6  polyethylene glycol 3350 oral powder for reconstitution: 17 gram(s) orally once a day (at bedtime), As Needed - for constipation  senna oral tablet: 2 tab(s) orally once a day (at bedtime), As Needed - for constipation  Trulicity Pen 1.5 mg/0.5 mL subcutaneous solution: 1 dose(s) subcutaneous once a week  Monday  Vitamin C 500 mg oral tablet: 2 tab(s) orally once a day  Vitamin D3 2000 intl units (50 mcg) oral tablet: 1 tab(s) orally once a day

## 2021-05-24 NOTE — BRIEF OPERATIVE NOTE - NSICDXBRIEFPREOP_GEN_ALL_CORE_FT
PRE-OP DIAGNOSIS:  Primary localized osteoarthritis of right hip 24-May-2021 13:03:36  Andrés Crespo

## 2021-05-24 NOTE — ASU PREOP CHECKLIST - SELECT TESTS ORDERED
Continuity of Care Form    Patient Name: Prabha Blackwell   :  1931  MRN:  [id number]    Chaz Lunsford MD  PCP: Maikel Barrera MD    Discharging Nurse: Faith Mcgowan 23 Unit/Room#: 9749/7038-31  Discharging Unit Phone Number: [phone]    Emergency Contact:   Extended Emergency Contact Information  Primary Emergency Contact: Wanda Modi [facility] [address]  Phone: [phone]  Relation: Child  Secondary Emergency Contact: Pamela Patelbogdan  Address: [address] [place] [address] Phone: [phone]  Relation: Child    Past Surgical History:  Past Surgical History:   Procedure Laterality Date    CARDIAC CATHETERIZATION      HYSTERECTOMY      vaginal    PACEMAKER PLACEMENT  2013    [employer] Pacemaker [id number] # [id number]   [facility] [facility]       Immunization History:   Immunization History   Administered Date(s) Administered    Influenza Vaccine, unspecified formulation 10/08/2015    Influenza Virus Vaccine 10/10/2014    Influenza, High Dose (Fluzone 65 yrs and older) 10/08/2015, 2017, 2017, 2018    Pneumococcal 13-valent Conjugate ([id number]) 10/08/2015    Pneumococcal Polysaccharide ([id number]) 
BMP/CBC/CMP/PT/PTT/INR/Type and Screen/POCT Blood Glucose/COVID-19

## 2021-05-24 NOTE — DISCHARGE NOTE PROVIDER - NSDCCPTREATMENT_GEN_ALL_CORE_FT
PRINCIPAL PROCEDURE  Procedure: Total replacement of right hip joint by anterior approach  Findings and Treatment: severe DJD right hip

## 2021-05-24 NOTE — PHYSICAL THERAPY INITIAL EVALUATION ADULT - ADDITIONAL COMMENTS
Pt lives with spouse and daughter in a house w/ 3 steps to enter with rail + 1 step through the door.  Pt bedroom and bathroom is on the first floor.  Pt has straight cane, rolling walker and commode

## 2021-05-25 ENCOUNTER — TRANSCRIPTION ENCOUNTER (OUTPATIENT)
Age: 76
End: 2021-05-25

## 2021-05-25 VITALS
SYSTOLIC BLOOD PRESSURE: 114 MMHG | DIASTOLIC BLOOD PRESSURE: 61 MMHG | TEMPERATURE: 98 F | HEART RATE: 95 BPM | OXYGEN SATURATION: 97 % | RESPIRATION RATE: 18 BRPM

## 2021-05-25 LAB
ANION GAP SERPL CALC-SCNC: 10 MMOL/L — SIGNIFICANT CHANGE UP (ref 5–17)
BUN SERPL-MCNC: 19 MG/DL — SIGNIFICANT CHANGE UP (ref 7–23)
CALCIUM SERPL-MCNC: 9.4 MG/DL — SIGNIFICANT CHANGE UP (ref 8.4–10.5)
CHLORIDE SERPL-SCNC: 105 MMOL/L — SIGNIFICANT CHANGE UP (ref 96–108)
CO2 SERPL-SCNC: 26 MMOL/L — SIGNIFICANT CHANGE UP (ref 22–31)
COVID-19 SPIKE DOMAIN AB INTERP: POSITIVE
COVID-19 SPIKE DOMAIN ANTIBODY RESULT: >250 U/ML — HIGH
CREAT SERPL-MCNC: 0.76 MG/DL — SIGNIFICANT CHANGE UP (ref 0.5–1.3)
GLUCOSE BLDC GLUCOMTR-MCNC: 172 MG/DL — HIGH (ref 70–99)
GLUCOSE BLDC GLUCOMTR-MCNC: 238 MG/DL — HIGH (ref 70–99)
GLUCOSE BLDC GLUCOMTR-MCNC: 241 MG/DL — HIGH (ref 70–99)
GLUCOSE SERPL-MCNC: 158 MG/DL — HIGH (ref 70–99)
HCT VFR BLD CALC: 28.4 % — LOW (ref 34.5–45)
HGB BLD-MCNC: 9.4 G/DL — LOW (ref 11.5–15.5)
MCHC RBC-ENTMCNC: 28.3 PG — SIGNIFICANT CHANGE UP (ref 27–34)
MCHC RBC-ENTMCNC: 33.1 GM/DL — SIGNIFICANT CHANGE UP (ref 32–36)
MCV RBC AUTO: 85.5 FL — SIGNIFICANT CHANGE UP (ref 80–100)
NRBC # BLD: 0 /100 WBCS — SIGNIFICANT CHANGE UP (ref 0–0)
PLATELET # BLD AUTO: 162 K/UL — SIGNIFICANT CHANGE UP (ref 150–400)
POTASSIUM SERPL-MCNC: 4 MMOL/L — SIGNIFICANT CHANGE UP (ref 3.5–5.3)
POTASSIUM SERPL-SCNC: 4 MMOL/L — SIGNIFICANT CHANGE UP (ref 3.5–5.3)
RBC # BLD: 3.32 M/UL — LOW (ref 3.8–5.2)
RBC # FLD: 13.5 % — SIGNIFICANT CHANGE UP (ref 10.3–14.5)
SARS-COV-2 IGG+IGM SERPL QL IA: >250 U/ML — HIGH
SARS-COV-2 IGG+IGM SERPL QL IA: POSITIVE
SODIUM SERPL-SCNC: 141 MMOL/L — SIGNIFICANT CHANGE UP (ref 135–145)
WBC # BLD: 14.11 K/UL — HIGH (ref 3.8–10.5)
WBC # FLD AUTO: 14.11 K/UL — HIGH (ref 3.8–10.5)

## 2021-05-25 PROCEDURE — 99232 SBSQ HOSP IP/OBS MODERATE 35: CPT

## 2021-05-25 PROCEDURE — 86769 SARS-COV-2 COVID-19 ANTIBODY: CPT

## 2021-05-25 PROCEDURE — 85027 COMPLETE CBC AUTOMATED: CPT

## 2021-05-25 PROCEDURE — 97110 THERAPEUTIC EXERCISES: CPT

## 2021-05-25 PROCEDURE — 97535 SELF CARE MNGMENT TRAINING: CPT

## 2021-05-25 PROCEDURE — U0003: CPT

## 2021-05-25 PROCEDURE — 88305 TISSUE EXAM BY PATHOLOGIST: CPT

## 2021-05-25 PROCEDURE — U0005: CPT

## 2021-05-25 PROCEDURE — 85610 PROTHROMBIN TIME: CPT

## 2021-05-25 PROCEDURE — 82962 GLUCOSE BLOOD TEST: CPT

## 2021-05-25 PROCEDURE — 27130 TOTAL HIP ARTHROPLASTY: CPT

## 2021-05-25 PROCEDURE — 97116 GAIT TRAINING THERAPY: CPT

## 2021-05-25 PROCEDURE — 36415 COLL VENOUS BLD VENIPUNCTURE: CPT

## 2021-05-25 PROCEDURE — 97161 PT EVAL LOW COMPLEX 20 MIN: CPT

## 2021-05-25 PROCEDURE — 85014 HEMATOCRIT: CPT

## 2021-05-25 PROCEDURE — 85018 HEMOGLOBIN: CPT

## 2021-05-25 PROCEDURE — 80048 BASIC METABOLIC PNL TOTAL CA: CPT

## 2021-05-25 PROCEDURE — C1713: CPT

## 2021-05-25 PROCEDURE — C1889: CPT

## 2021-05-25 PROCEDURE — 97530 THERAPEUTIC ACTIVITIES: CPT

## 2021-05-25 PROCEDURE — 88311 DECALCIFY TISSUE: CPT

## 2021-05-25 PROCEDURE — 76000 FLUOROSCOPY <1 HR PHYS/QHP: CPT

## 2021-05-25 PROCEDURE — 97165 OT EVAL LOW COMPLEX 30 MIN: CPT

## 2021-05-25 PROCEDURE — C1776: CPT

## 2021-05-25 RX ORDER — POLYETHYLENE GLYCOL 3350 17 G/17G
17 POWDER, FOR SOLUTION ORAL
Qty: 0 | Refills: 0 | DISCHARGE
Start: 2021-05-25

## 2021-05-25 RX ORDER — IBUPROFEN 200 MG
2 TABLET ORAL
Qty: 0 | Refills: 0 | DISCHARGE

## 2021-05-25 RX ORDER — ACETAMINOPHEN 500 MG
2 TABLET ORAL
Qty: 0 | Refills: 0 | DISCHARGE
Start: 2021-05-25

## 2021-05-25 RX ORDER — APIXABAN 2.5 MG/1
1 TABLET, FILM COATED ORAL
Qty: 60 | Refills: 0
Start: 2021-05-25 | End: 2021-06-23

## 2021-05-25 RX ORDER — ASPIRIN/CALCIUM CARB/MAGNESIUM 324 MG
1 TABLET ORAL
Qty: 42 | Refills: 0
Start: 2021-05-25 | End: 2021-07-05

## 2021-05-25 RX ORDER — APIXABAN 2.5 MG/1
2.5 TABLET, FILM COATED ORAL EVERY 12 HOURS
Refills: 0 | Status: DISCONTINUED | OUTPATIENT
Start: 2021-05-25 | End: 2021-05-25

## 2021-05-25 RX ORDER — OXYCODONE HYDROCHLORIDE 5 MG/1
1 TABLET ORAL
Qty: 42 | Refills: 0
Start: 2021-05-25 | End: 2021-05-31

## 2021-05-25 RX ORDER — METOPROLOL TARTRATE 50 MG
3 TABLET ORAL
Qty: 0 | Refills: 0 | DISCHARGE

## 2021-05-25 RX ORDER — ASPIRIN/CALCIUM CARB/MAGNESIUM 324 MG
81 TABLET ORAL DAILY
Refills: 0 | Status: DISCONTINUED | OUTPATIENT
Start: 2021-05-26 | End: 2021-05-25

## 2021-05-25 RX ORDER — SENNA PLUS 8.6 MG/1
2 TABLET ORAL
Qty: 0 | Refills: 0 | DISCHARGE
Start: 2021-05-25

## 2021-05-25 RX ORDER — ASPIRIN/CALCIUM CARB/MAGNESIUM 324 MG
1 TABLET ORAL
Qty: 0 | Refills: 0 | DISCHARGE

## 2021-05-25 RX ORDER — APIXABAN 2.5 MG/1
1 TABLET, FILM COATED ORAL
Qty: 9 | Refills: 0
Start: 2021-05-25

## 2021-05-25 RX ORDER — METOPROLOL TARTRATE 50 MG
150 TABLET ORAL
Qty: 0 | Refills: 0 | DISCHARGE

## 2021-05-25 RX ADMIN — Medication 81 MILLIGRAM(S): at 06:31

## 2021-05-25 RX ADMIN — Medication 1000 MILLIGRAM(S): at 08:22

## 2021-05-25 RX ADMIN — Medication 2: at 13:19

## 2021-05-25 RX ADMIN — Medication 20 MILLIGRAM(S): at 13:19

## 2021-05-25 RX ADMIN — Medication 1000 MILLIGRAM(S): at 01:48

## 2021-05-25 RX ADMIN — APIXABAN 2.5 MILLIGRAM(S): 2.5 TABLET, FILM COATED ORAL at 10:43

## 2021-05-25 RX ADMIN — Medication 400 MILLIGRAM(S): at 01:32

## 2021-05-25 RX ADMIN — OXYCODONE HYDROCHLORIDE 5 MILLIGRAM(S): 5 TABLET ORAL at 09:19

## 2021-05-25 RX ADMIN — Medication 50 MILLIGRAM(S): at 06:31

## 2021-05-25 RX ADMIN — Medication 1: at 08:19

## 2021-05-25 RX ADMIN — Medication 1000 MILLIGRAM(S): at 08:21

## 2021-05-25 RX ADMIN — CELECOXIB 100 MILLIGRAM(S): 200 CAPSULE ORAL at 08:21

## 2021-05-25 RX ADMIN — Medication 145 MILLIGRAM(S): at 13:19

## 2021-05-25 RX ADMIN — METFORMIN HYDROCHLORIDE 500 MILLIGRAM(S): 850 TABLET ORAL at 08:22

## 2021-05-25 RX ADMIN — OXYCODONE HYDROCHLORIDE 5 MILLIGRAM(S): 5 TABLET ORAL at 09:49

## 2021-05-25 RX ADMIN — CELECOXIB 100 MILLIGRAM(S): 200 CAPSULE ORAL at 08:22

## 2021-05-25 RX ADMIN — PANTOPRAZOLE SODIUM 40 MILLIGRAM(S): 20 TABLET, DELAYED RELEASE ORAL at 06:31

## 2021-05-25 NOTE — DISCHARGE NOTE NURSING/CASE MANAGEMENT/SOCIAL WORK - NSSCCONTNUM_GEN_ALL_CORE
Please contact the home care agency at  [phone] if you have not heard from them by 12 noon on the day after your hospital discharge.

## 2021-05-25 NOTE — PROGRESS NOTE ADULT - SUBJECTIVE AND OBJECTIVE BOX
Subjective: Patient seen and examined. Doing well with no overnight events.     MEDICATIONS  (STANDING):  acetaminophen   Tablet .. 1000 milliGRAM(s) Oral every 8 hours  apixaban 2.5 milliGRAM(s) Oral every 12 hours  atorvastatin 40 milliGRAM(s) Oral at bedtime  celecoxib 100 milliGRAM(s) Oral every 12 hours  dextrose 40% Gel 15 Gram(s) Oral once  dextrose 40% Gel 15 Gram(s) Oral once  dextrose 5%. 1000 milliLiter(s) (50 mL/Hr) IV Continuous <Continuous>  dextrose 5%. 1000 milliLiter(s) (100 mL/Hr) IV Continuous <Continuous>  dextrose 50% Injectable 25 Gram(s) IV Push once  dextrose 50% Injectable 12.5 Gram(s) IV Push once  dextrose 50% Injectable 25 Gram(s) IV Push once  dextrose 50% Injectable 25 Gram(s) IV Push once  dextrose 50% Injectable 12.5 Gram(s) IV Push once  dextrose 50% Injectable 25 Gram(s) IV Push once  fenofibrate Tablet 145 milliGRAM(s) Oral daily  FLUoxetine 20 milliGRAM(s) Oral daily  glucagon  Injectable 1 milliGRAM(s) IntraMuscular once  glucagon  Injectable 1 milliGRAM(s) IntraMuscular once  HYDROmorphone  Injectable 0.5 milliGRAM(s) IV Push once  insulin lispro (ADMELOG) corrective regimen sliding scale   SubCutaneous Before meals and at bedtime  lactated ringers. 1000 milliLiter(s) (100 mL/Hr) IV Continuous <Continuous>  metFORMIN 500 milliGRAM(s) Oral two times a day  metoprolol succinate ER 50 milliGRAM(s) Oral daily  nystatin Cream 1 Application(s) Topical two times a day  pantoprazole    Tablet 40 milliGRAM(s) Oral before breakfast  polyethylene glycol 3350 17 Gram(s) Oral at bedtime  senna 2 Tablet(s) Oral at bedtime    MEDICATIONS  (PRN):  aluminum hydroxide/magnesium hydroxide/simethicone Suspension 30 milliLiter(s) Oral four times a day PRN Indigestion  diazepam    Tablet 5 milliGRAM(s) Oral at bedtime PRN anxiety, insomnia  diphenoxylate/atropine 2 Tablet(s) Oral four times a day PRN for diarrhea  magnesium hydroxide Suspension 30 milliLiter(s) Oral daily PRN Constipation  meclizine 12.5 milliGRAM(s) Oral every 6 hours PRN Dizziness  oxyCODONE    IR 5 milliGRAM(s) Oral every 3 hours PRN Moderate Pain (4 - 6)  oxyCODONE    IR 10 milliGRAM(s) Oral every 3 hours PRN Severe Pain (7 - 10)      Allergies    amoxicillin (Pruritus)  erythromycin (Pruritus)    Intolerances        Vital Signs Last 24 Hrs  T(C): 37.1 (25 May 2021 08:04), Max: 37.3 (24 May 2021 23:00)  T(F): 98.8 (25 May 2021 08:04), Max: 99.1 (24 May 2021 23:00)  HR: 87 (25 May 2021 08:04) (59 - 89)  BP: 142/- (25 May 2021 08:04) (99/57 - 142/-)  BP(mean): 81 (24 May 2021 17:00) (60 - 81)  RR: 18 (25 May 2021 08:04) (11 - 21)  SpO2: 98% (25 May 2021 08:04) (88% - 100%)    PHYSICAL EXAM:  GENERAL: NAD, well-groomed, well-developed  HEAD:  Atraumatic, Normocephalic  ENMT: Moist mucous membranes,   NECK: Supple, No JVD, Normal thyroid  NERVOUS SYSTEM:  All 4 extremities mobile, no gross sensory deficits.   CHEST/LUNG: Clear to auscultation bilaterally; No rales, rhonchi, wheezing, or rubs  HEART: Regular rate and rhythm; No murmurs, rubs, or gallops  ABDOMEN: Soft, Nontender, Nondistended; Bowel sounds present  EXTREMITIES:  2+ Peripheral Pulses, No clubbing, cyanosis, or edema      LABS:                        9.4    14.11 )-----------( 162      ( 25 May 2021 07:46 )             28.4     25 May 2021 07:46    141    |  105    |  19     ----------------------------<  158    4.0     |  26     |  0.76     Ca    9.4        25 May 2021 07:46      PT/INR - ( 24 May 2021 08:39 )   PT: 14.9 sec;   INR: 1.24 ratio             CAPILLARY BLOOD GLUCOSE      POCT Blood Glucose.: 172 mg/dL (25 May 2021 07:51)  POCT Blood Glucose.: 318 mg/dL (24 May 2021 22:16)  POCT Blood Glucose.: 215 mg/dL (24 May 2021 17:25)      RADIOLOGY & ADDITIONAL TESTS:    Imaging Personally Reviewed:  [ ] YES     Consultant(s) Notes Reviewed:      Care Discussed with Consultants/Other Providers:    Advanced Directives: [ ] DNR  [ ] No feeding tube  [ ] MOLST in chart  [ ] MOLST completed today  [ ] Unknown  
Post Op Day #1    SUBJECTIVE    76yo Female status post Right ant THR .   Patient is alert and comfortable.    Pain is controlled with current pain regimen.  Denies nausea, vomiting, chest pain, shortness of breath, abdominal pain or fever.   No new complaints.    OBJECTIVE    Vital Signs Last 24 Hrs  T(C): 37.2 (25 May 2021 03:00), Max: 37.3 (24 May 2021 23:00)  T(F): 98.9 (25 May 2021 03:00), Max: 99.1 (24 May 2021 23:00)  HR: 85 (25 May 2021 06:23) (59 - 89)  BP: 126/69 (25 May 2021 06:23) (99/57 - 148/67)  BP(mean): 81 (24 May 2021 17:00) (60 - 81)  RR: 18 (25 May 2021 03:00) (11 - 21)  SpO2: 96% (25 May 2021 03:00) (88% - 100%)  I&O's Summary    24 May 2021 07:01  -  25 May 2021 07:00  --------------------------------------------------------  IN: 3020 mL / OUT: 1150 mL / NET: 1870 mL        PHYSICAL EXAM    Right Hip Silverlon dressing is clean, dry and intact.   The calf is supple/nontender.   Sensation to light touch is grossly intact distally.   Motor function distally is intact.   No foot drop.   (2+) dorsalis pedis pulse. Capillary refill is less than 2 seconds. No cyanosis.                          10.9<L>  x     )-----------( x        ( 24 May 2021 18:21 )             33.0<L>  24 May 2021 18:21    24 May 2021 18:21    139    |  102    |  13     ----------------------------<  231<H>  4.9     |  27     |  0.72     Ca    8.8        24 May 2021 18:21        ASSESSMENT AND PLAN    - Orthopedically stable  - DVT prophylaxis: PAS + Ecotrin 81mg twice daily  -  HO prophylaxis: Celebrex 100mg PO twice daily x21 days  - Continue physical therapy and occupational therapy  - Weight bearing as tolerated of the right lower extremity with assistance of a walker  - Incentive spirometry encouraged  - Pain control as clinically indicated  - Disposition: Home when cleared by medicine, PT, and OT   
Discharge medication calendar:  (ASA 81mg Qday preop)  Eliquis 2.5mg q12h x 35 days  ASA 81mg Qday   APAP 1000mg q8h x 2-3 weeks  Celecoxib 100mg q12h x 21 days only  Omeprazole 20mg QAM x 6 weeks  Narcotic PRN  Docusate 100mg TID while taking narcotic  Miralax, Senna, or Bisacodyl PRN for treatment of constipation

## 2021-05-25 NOTE — DISCHARGE NOTE NURSING/CASE MANAGEMENT/SOCIAL WORK - PATIENT PORTAL LINK FT
You can access the FollowMyHealth Patient Portal offered by [facility] by registering at the following website: [email or web address]. By joining [facility]’s FollowMyHealth portal, you will also be able to view your health information using other applications (apps) compatible with our system.

## 2021-05-25 NOTE — PHARMACOTHERAPY INTERVENTION NOTE - COMMENTS
Transition of Care video discharge education - medication calendar given to patient Pharmacy fill confirmed.
Admission medication reconciliation POD1
Patient is Caprini 10 (diastolic HF on cardiac clearance). DC ASA 81mg q12h. Resume ASA 81mg Qday (hx CABG). Eliquis 2.5mg q12h x 35 days.

## 2021-05-25 NOTE — PROGRESS NOTE ADULT - ASSESSMENT
75F CAD, HTN, HLD, DM 2, Anxiety, and OA admitted for aftercare following Right THR.     S/P Right THR  POD 1  Continue Bowel and pain control regimen.   Incentive Spirometer for lung expansion.  Work with PT to increase ambulation as per orthopedics.    Leukocytosis  Most likely reactive; Remains afebrile and no signs or symptoms of Infection  Monitor Off Abx and will trend CBC Monitor Hgb and follow up electrolytes.   Orthopedics on board and following     CAD / HTN / HLD   Aspirin + Metoprolol + Furosemide + Atorvastatin     DM 2  Hold Oral Agents and Trulicity   Hyperglycemia in the AM but improved   Resume home meds on discharge  ISS and control BS <180 while inpatient     Anxiety  Fluoxetine + Diazepam     Diet  Regular    DVT Prophylaxis  Eliquis     Disposition  Full Code/Inpatient  Discharge planning pending hospital course               75F CAD, HTN, HLD, DM 2, Anxiety, and OA admitted for aftercare following Right THR.     S/P Right THR  POD 1  Continue Bowel and pain control regimen.   Incentive Spirometer for lung expansion.  Work with PT to increase ambulation as per orthopedics.    Leukocytosis  Most likely reactive; Remains afebrile and no signs or symptoms of Infection  Monitor Off Abx and will trend CBC Monitor Hgb and follow up electrolytes.   Orthopedics on board and following     CAD / HTN / HLD   Aspirin + Metoprolol + Furosemide + Atorvastatin     DM 2  Hold Oral Agents and Trulicity   Hyperglycemia in the AM but improved   Resume home meds on discharge  ISS and control BS <180 while inpatient     Anxiety  Fluoxetine + Diazepam     Diet  Regular    DVT Prophylaxis  Eliquis     Disposition  Full Code/Inpatient  Patient medically optimized for discharge home if cleared by PT and Ortho.

## 2021-05-25 NOTE — OCCUPATIONAL THERAPY INITIAL EVALUATION ADULT - ADDITIONAL COMMENTS
Pt lives in private home with . Bed and bathroom are on the first floor. +tub with grab bars. Pt owns 3in1 commode, SAC, and RW. Pt was independent with ADLs prior to surgery, and used a SAC for functional mobility.

## 2021-05-25 NOTE — DISCHARGE NOTE NURSING/CASE MANAGEMENT/SOCIAL WORK - CASE MANAGER'S NAME
Your [facility] RN/ Maryuri Tenorio can be reached at [phone] or main office # [phone]

## 2021-05-25 NOTE — OCCUPATIONAL THERAPY INITIAL EVALUATION ADULT - LEVEL OF INDEPENDENCE: DRESS LOWER BODY, OT EVAL
Pt states her  has previously helped with donning socks prior to surgery, and will continue to assist during recovery/minimum assist (75% patients effort)

## 2021-05-25 NOTE — OCCUPATIONAL THERAPY INITIAL EVALUATION ADULT - TRANSFER TRAINING, PT EVAL
1. Pt will perform safe tub transfer I'ly within 1-2 sessions. 2. Pt will perform car transfer I'ly within 1-2 sessions.

## 2021-06-07 ENCOUNTER — APPOINTMENT (OUTPATIENT)
Dept: ORTHOPEDIC SURGERY | Facility: CLINIC | Age: 76
End: 2021-06-07

## 2021-06-07 ENCOUNTER — NON-APPOINTMENT (OUTPATIENT)
Age: 76
End: 2021-06-07

## 2021-06-08 ENCOUNTER — INPATIENT (INPATIENT)
Facility: HOSPITAL | Age: 76
LOS: 3 days | Discharge: HOME | DRG: 690 | End: 2021-06-12
Attending: HOSPITALIST | Admitting: HOSPITALIST
Payer: MEDICARE

## 2021-06-08 VITALS
DIASTOLIC BLOOD PRESSURE: 68 MMHG | SYSTOLIC BLOOD PRESSURE: 144 MMHG | HEART RATE: 95 BPM | TEMPERATURE: 99 F | OXYGEN SATURATION: 100 % | HEIGHT: 61 IN | WEIGHT: 169.98 LBS | RESPIRATION RATE: 18 BRPM

## 2021-06-08 DIAGNOSIS — Z98.49 CATARACT EXTRACTION STATUS, UNSPECIFIED EYE: Chronic | ICD-10-CM

## 2021-06-08 DIAGNOSIS — Z96.651 PRESENCE OF RIGHT ARTIFICIAL KNEE JOINT: Chronic | ICD-10-CM

## 2021-06-08 DIAGNOSIS — Z98.89 OTHER SPECIFIED POSTPROCEDURAL STATES: Chronic | ICD-10-CM

## 2021-06-08 DIAGNOSIS — R50.9 FEVER, UNSPECIFIED: ICD-10-CM

## 2021-06-08 DIAGNOSIS — Z98.890 OTHER SPECIFIED POSTPROCEDURAL STATES: Chronic | ICD-10-CM

## 2021-06-08 DIAGNOSIS — Z95.1 PRESENCE OF AORTOCORONARY BYPASS GRAFT: Chronic | ICD-10-CM

## 2021-06-08 LAB
ALBUMIN SERPL ELPH-MCNC: 3.3 G/DL — SIGNIFICANT CHANGE UP (ref 3.3–5)
ALP SERPL-CCNC: 55 U/L — SIGNIFICANT CHANGE UP (ref 30–120)
ALT FLD-CCNC: 27 U/L DA — SIGNIFICANT CHANGE UP (ref 10–60)
ANION GAP SERPL CALC-SCNC: 12 MMOL/L — SIGNIFICANT CHANGE UP (ref 5–17)
APPEARANCE UR: ABNORMAL
AST SERPL-CCNC: 27 U/L — SIGNIFICANT CHANGE UP (ref 10–40)
BACTERIA # UR AUTO: ABNORMAL
BASOPHILS # BLD AUTO: 0 K/UL — SIGNIFICANT CHANGE UP (ref 0–0.2)
BASOPHILS NFR BLD AUTO: 0 % — SIGNIFICANT CHANGE UP (ref 0–2)
BILIRUB SERPL-MCNC: 0.9 MG/DL — SIGNIFICANT CHANGE UP (ref 0.2–1.2)
BILIRUB UR-MCNC: ABNORMAL
BLD GP AB SCN SERPL QL: SIGNIFICANT CHANGE UP
BUN SERPL-MCNC: 14 MG/DL — SIGNIFICANT CHANGE UP (ref 7–23)
CALCIUM SERPL-MCNC: 9.3 MG/DL — SIGNIFICANT CHANGE UP (ref 8.4–10.5)
CHLORIDE SERPL-SCNC: 98 MMOL/L — SIGNIFICANT CHANGE UP (ref 96–108)
CO2 SERPL-SCNC: 25 MMOL/L — SIGNIFICANT CHANGE UP (ref 22–31)
COLOR SPEC: YELLOW — SIGNIFICANT CHANGE UP
CREAT SERPL-MCNC: 0.67 MG/DL — SIGNIFICANT CHANGE UP (ref 0.5–1.3)
DIFF PNL FLD: ABNORMAL
EOSINOPHIL # BLD AUTO: 0.13 K/UL — SIGNIFICANT CHANGE UP (ref 0–0.5)
EOSINOPHIL NFR BLD AUTO: 2 % — SIGNIFICANT CHANGE UP (ref 0–6)
EPI CELLS # UR: ABNORMAL
GLUCOSE BLDC GLUCOMTR-MCNC: 135 MG/DL — HIGH (ref 70–99)
GLUCOSE BLDC GLUCOMTR-MCNC: 140 MG/DL — HIGH (ref 70–99)
GLUCOSE BLDC GLUCOMTR-MCNC: 182 MG/DL — HIGH (ref 70–99)
GLUCOSE SERPL-MCNC: 216 MG/DL — HIGH (ref 70–99)
GLUCOSE UR QL: 50 MG/DL
HCT VFR BLD CALC: 22.4 % — LOW (ref 34.5–45)
HCT VFR BLD CALC: 24 % — LOW (ref 34.5–45)
HGB BLD-MCNC: 7 G/DL — CRITICAL LOW (ref 11.5–15.5)
HGB BLD-MCNC: 7.7 G/DL — LOW (ref 11.5–15.5)
KETONES UR-MCNC: ABNORMAL
LACTATE SERPL-SCNC: 2 MMOL/L — SIGNIFICANT CHANGE UP (ref 0.7–2)
LACTATE SERPL-SCNC: 2.4 MMOL/L — HIGH (ref 0.7–2)
LACTATE SERPL-SCNC: 3 MMOL/L — HIGH (ref 0.7–2)
LEUKOCYTE ESTERASE UR-ACNC: ABNORMAL
LIDOCAIN IGE QN: 137 U/L — SIGNIFICANT CHANGE UP (ref 73–393)
LYMPHOCYTES # BLD AUTO: 1.04 K/UL — SIGNIFICANT CHANGE UP (ref 1–3.3)
LYMPHOCYTES # BLD AUTO: 16 % — SIGNIFICANT CHANGE UP (ref 13–44)
MANUAL SMEAR VERIFICATION: SIGNIFICANT CHANGE UP
MCHC RBC-ENTMCNC: 26.9 PG — LOW (ref 27–34)
MCHC RBC-ENTMCNC: 27.2 PG — SIGNIFICANT CHANGE UP (ref 27–34)
MCHC RBC-ENTMCNC: 31.3 GM/DL — LOW (ref 32–36)
MCHC RBC-ENTMCNC: 32.1 GM/DL — SIGNIFICANT CHANGE UP (ref 32–36)
MCV RBC AUTO: 84.8 FL — SIGNIFICANT CHANGE UP (ref 80–100)
MCV RBC AUTO: 86.2 FL — SIGNIFICANT CHANGE UP (ref 80–100)
MONOCYTES # BLD AUTO: 0.72 K/UL — SIGNIFICANT CHANGE UP (ref 0–0.9)
MONOCYTES NFR BLD AUTO: 11 % — SIGNIFICANT CHANGE UP (ref 2–14)
NEUTROPHILS # BLD AUTO: 4.64 K/UL — SIGNIFICANT CHANGE UP (ref 1.8–7.4)
NEUTROPHILS NFR BLD AUTO: 64 % — SIGNIFICANT CHANGE UP (ref 43–77)
NEUTS BAND # BLD: 7 % — SIGNIFICANT CHANGE UP (ref 0–8)
NITRITE UR-MCNC: POSITIVE
NRBC # BLD: 0 /100 WBCS — SIGNIFICANT CHANGE UP (ref 0–0)
NRBC # BLD: 0 — SIGNIFICANT CHANGE UP
NRBC # BLD: SIGNIFICANT CHANGE UP /100 WBCS (ref 0–0)
PH UR: 5 — SIGNIFICANT CHANGE UP (ref 5–8)
PLAT MORPH BLD: NORMAL — SIGNIFICANT CHANGE UP
PLATELET # BLD AUTO: 198 K/UL — SIGNIFICANT CHANGE UP (ref 150–400)
PLATELET # BLD AUTO: 206 K/UL — SIGNIFICANT CHANGE UP (ref 150–400)
POTASSIUM SERPL-MCNC: 3.4 MMOL/L — LOW (ref 3.5–5.3)
POTASSIUM SERPL-SCNC: 3.4 MMOL/L — LOW (ref 3.5–5.3)
PROT SERPL-MCNC: 7.3 G/DL — SIGNIFICANT CHANGE UP (ref 6–8.3)
PROT UR-MCNC: 100 MG/DL
RBC # BLD: 2.6 M/UL — LOW (ref 3.8–5.2)
RBC # BLD: 2.83 M/UL — LOW (ref 3.8–5.2)
RBC # FLD: 14.6 % — HIGH (ref 10.3–14.5)
RBC # FLD: 14.7 % — HIGH (ref 10.3–14.5)
RBC BLD AUTO: NORMAL — SIGNIFICANT CHANGE UP
RBC CASTS # UR COMP ASSIST: ABNORMAL /HPF (ref 0–4)
SARS-COV-2 RNA SPEC QL NAA+PROBE: SIGNIFICANT CHANGE UP
SODIUM SERPL-SCNC: 135 MMOL/L — SIGNIFICANT CHANGE UP (ref 135–145)
SP GR SPEC: 1.02 — SIGNIFICANT CHANGE UP (ref 1.01–1.02)
UROBILINOGEN FLD QL: 4 MG/DL
WBC # BLD: 5.29 K/UL — SIGNIFICANT CHANGE UP (ref 3.8–10.5)
WBC # BLD: 6.53 K/UL — SIGNIFICANT CHANGE UP (ref 3.8–10.5)
WBC # FLD AUTO: 5.29 K/UL — SIGNIFICANT CHANGE UP (ref 3.8–10.5)
WBC # FLD AUTO: 6.53 K/UL — SIGNIFICANT CHANGE UP (ref 3.8–10.5)
WBC UR QL: ABNORMAL

## 2021-06-08 PROCEDURE — 93971 EXTREMITY STUDY: CPT | Mod: 26,RT

## 2021-06-08 PROCEDURE — 71045 X-RAY EXAM CHEST 1 VIEW: CPT | Mod: 26

## 2021-06-08 PROCEDURE — 99285 EMERGENCY DEPT VISIT HI MDM: CPT

## 2021-06-08 PROCEDURE — 99223 1ST HOSP IP/OBS HIGH 75: CPT | Mod: AI

## 2021-06-08 RX ORDER — OXYCODONE HYDROCHLORIDE 5 MG/1
5 TABLET ORAL EVERY 4 HOURS
Refills: 0 | Status: DISCONTINUED | OUTPATIENT
Start: 2021-06-08 | End: 2021-06-12

## 2021-06-08 RX ORDER — CEFTRIAXONE 500 MG/1
1000 INJECTION, POWDER, FOR SOLUTION INTRAMUSCULAR; INTRAVENOUS EVERY 24 HOURS
Refills: 0 | Status: DISCONTINUED | OUTPATIENT
Start: 2021-06-08 | End: 2021-06-12

## 2021-06-08 RX ORDER — IOHEXOL 300 MG/ML
30 INJECTION, SOLUTION INTRAVENOUS ONCE
Refills: 0 | Status: DISCONTINUED | OUTPATIENT
Start: 2021-06-08 | End: 2021-06-12

## 2021-06-08 RX ORDER — INSULIN LISPRO 100/ML
VIAL (ML) SUBCUTANEOUS
Refills: 0 | Status: DISCONTINUED | OUTPATIENT
Start: 2021-06-08 | End: 2021-06-12

## 2021-06-08 RX ORDER — DEXTROSE 50 % IN WATER 50 %
25 SYRINGE (ML) INTRAVENOUS ONCE
Refills: 0 | Status: DISCONTINUED | OUTPATIENT
Start: 2021-06-08 | End: 2021-06-12

## 2021-06-08 RX ORDER — SODIUM CHLORIDE 9 MG/ML
1000 INJECTION INTRAMUSCULAR; INTRAVENOUS; SUBCUTANEOUS ONCE
Refills: 0 | Status: COMPLETED | OUTPATIENT
Start: 2021-06-08 | End: 2021-06-08

## 2021-06-08 RX ORDER — SODIUM CHLORIDE 9 MG/ML
1000 INJECTION, SOLUTION INTRAVENOUS
Refills: 0 | Status: DISCONTINUED | OUTPATIENT
Start: 2021-06-08 | End: 2021-06-12

## 2021-06-08 RX ORDER — FLUOXETINE HCL 10 MG
20 CAPSULE ORAL DAILY
Refills: 0 | Status: DISCONTINUED | OUTPATIENT
Start: 2021-06-08 | End: 2021-06-12

## 2021-06-08 RX ORDER — DIAZEPAM 5 MG
5 TABLET ORAL AT BEDTIME
Refills: 0 | Status: DISCONTINUED | OUTPATIENT
Start: 2021-06-08 | End: 2021-06-12

## 2021-06-08 RX ORDER — OXYBUTYNIN CHLORIDE 5 MG
10 TABLET ORAL DAILY
Refills: 0 | Status: DISCONTINUED | OUTPATIENT
Start: 2021-06-08 | End: 2021-06-12

## 2021-06-08 RX ORDER — DEXTROSE 50 % IN WATER 50 %
12.5 SYRINGE (ML) INTRAVENOUS ONCE
Refills: 0 | Status: DISCONTINUED | OUTPATIENT
Start: 2021-06-08 | End: 2021-06-12

## 2021-06-08 RX ORDER — SENNA PLUS 8.6 MG/1
2 TABLET ORAL AT BEDTIME
Refills: 0 | Status: DISCONTINUED | OUTPATIENT
Start: 2021-06-08 | End: 2021-06-12

## 2021-06-08 RX ORDER — DEXTROSE 50 % IN WATER 50 %
15 SYRINGE (ML) INTRAVENOUS ONCE
Refills: 0 | Status: DISCONTINUED | OUTPATIENT
Start: 2021-06-08 | End: 2021-06-12

## 2021-06-08 RX ORDER — CEFTRIAXONE 500 MG/1
1000 INJECTION, POWDER, FOR SOLUTION INTRAMUSCULAR; INTRAVENOUS ONCE
Refills: 0 | Status: COMPLETED | OUTPATIENT
Start: 2021-06-08 | End: 2021-06-08

## 2021-06-08 RX ORDER — FUROSEMIDE 40 MG
40 TABLET ORAL DAILY
Refills: 0 | Status: DISCONTINUED | OUTPATIENT
Start: 2021-06-08 | End: 2021-06-12

## 2021-06-08 RX ORDER — GLUCAGON INJECTION, SOLUTION 0.5 MG/.1ML
1 INJECTION, SOLUTION SUBCUTANEOUS ONCE
Refills: 0 | Status: DISCONTINUED | OUTPATIENT
Start: 2021-06-08 | End: 2021-06-12

## 2021-06-08 RX ORDER — ATORVASTATIN CALCIUM 80 MG/1
40 TABLET, FILM COATED ORAL AT BEDTIME
Refills: 0 | Status: DISCONTINUED | OUTPATIENT
Start: 2021-06-08 | End: 2021-06-12

## 2021-06-08 RX ORDER — PANTOPRAZOLE SODIUM 20 MG/1
40 TABLET, DELAYED RELEASE ORAL
Refills: 0 | Status: DISCONTINUED | OUTPATIENT
Start: 2021-06-08 | End: 2021-06-12

## 2021-06-08 RX ADMIN — ATORVASTATIN CALCIUM 40 MILLIGRAM(S): 80 TABLET, FILM COATED ORAL at 21:38

## 2021-06-08 RX ADMIN — Medication 5 MILLIGRAM(S): at 21:38

## 2021-06-08 RX ADMIN — CEFTRIAXONE 100 MILLIGRAM(S): 500 INJECTION, POWDER, FOR SOLUTION INTRAMUSCULAR; INTRAVENOUS at 11:19

## 2021-06-08 RX ADMIN — Medication 20 MILLIGRAM(S): at 17:43

## 2021-06-08 RX ADMIN — Medication 10 MILLIGRAM(S): at 17:44

## 2021-06-08 RX ADMIN — Medication 1: at 16:45

## 2021-06-08 RX ADMIN — CEFTRIAXONE 1000 MILLIGRAM(S): 500 INJECTION, POWDER, FOR SOLUTION INTRAMUSCULAR; INTRAVENOUS at 11:50

## 2021-06-08 RX ADMIN — Medication 40 MILLIGRAM(S): at 17:43

## 2021-06-08 RX ADMIN — SODIUM CHLORIDE 1000 MILLILITER(S): 9 INJECTION INTRAMUSCULAR; INTRAVENOUS; SUBCUTANEOUS at 10:19

## 2021-06-08 RX ADMIN — SODIUM CHLORIDE 1000 MILLILITER(S): 9 INJECTION INTRAMUSCULAR; INTRAVENOUS; SUBCUTANEOUS at 11:18

## 2021-06-08 NOTE — ED PROVIDER NOTE - PMH
Anxiety    Atypical hyperplasia of right breast    Coronary artery disease    COVID-19 vaccine series completed  pfizer, completed 4/8/21  Diarrhea  increased in frequency recently  Environmental allergies    Essential hypertension    Gastroesophageal reflux disease without esophagitis    H/O: pneumonia  treated in August 2016 as an outpatient for pneumonia  Hearing loss  left  History of vertigo  last episode December 2020  Hyperlipidemia    Insomnia  difficulty falling asleep  Irritable bowel    Leg swelling    Mixed stress and urge urinary incontinence    OAB (overactive bladder)    Obesity, Class I, BMI 30-34.9    Osteoarthritis    Osteopenia    Perineal rash    Type 2 diabetes mellitus    Urinary frequency

## 2021-06-08 NOTE — ED PROVIDER NOTE - CADM POA CENTRAL LINE
Patient Instructions by Kathrine Thompson MD at 07/26/18 05:53 PM     Author:  Kathrine Thompson MD Service:  (none) Author Type:  Physician     Filed:  07/26/18 05:54 PM Encounter Date:  7/26/2018 Status:  Signed     :  Kathrine Thompson MD (Physician)                   Rest  Fluids  Ibuprofen or tylenol as needed  If not feeling better over the next 3-5 days or fever not improving/persisting then needs to be reassessed in Immediate Care OR with Primary    Patient  to follow up with his regular Primary Care Physician or Walk-in Care if his symptoms fail to improve as anticipated, worsen, or new symptoms develop.     Please proceed to the nearest hospital Emergency Room or call [phone] for medical emergencies.    Additional Educational Resources:  For additional resources regarding your symptoms, diagnosis, or further health information, please visit the Health Resources section on [email or web address] or the Online Health Resources section in [employer].        Revision History        User Key Date/Time User Provider Type Action    > [N/A] 07/26/18 05:54 PM Kathrine Thompson MD Physician Sign             No

## 2021-06-08 NOTE — H&P ADULT - NSHPPHYSICALEXAM_GEN_ALL_CORE
GENERAL: NAD  HEAD:  Atraumatic, Normocephalic  EYES: EOMI, PERRLA, conjunctiva and sclera clear  ENMT: No tonsillar erythema, exudates, or enlargement; Moist mucous membranes  NECK: Supple, No JVD, Normal thyroid  CHEST/LUNG: Clear to percussion bilaterally; No rales, rhonchi, wheezing, or rubs  HEART: Regular rate and rhythm; No murmurs, rubs, or gallops  ABDOMEN: Soft, Nontender, Nondistended; Bowel sounds present  EXTREMITIES:  2+ Peripheral Pulses, No clubbing, cyanosis, or edema  NERVOUS SYSTEM:  Alert & Oriented X3, Good concentration; Motor Strength 5/5 B/L upper and lower extremities; DTRs 2+ intact and symmetric  SKIN: incisions intact on right hip, no warmth or erythema at incision site.

## 2021-06-08 NOTE — H&P ADULT - NSICDXPASTMEDICALHX_GEN_ALL_CORE_FT
PAST MEDICAL HISTORY:  Anxiety     Atypical hyperplasia of right breast     Coronary artery disease     COVID-19 vaccine series completed pfizer, completed 4/8/21    Diarrhea increased in frequency recently    Environmental allergies     Essential hypertension     Gastroesophageal reflux disease without esophagitis     H/O: pneumonia treated in August 2016 as an outpatient for pneumonia    Hearing loss left    History of vertigo last episode December 2020    Hyperlipidemia     Insomnia difficulty falling asleep    Irritable bowel     Leg swelling     Mixed stress and urge urinary incontinence     OAB (overactive bladder)     Obesity, Class I, BMI 30-34.9     Osteoarthritis     Osteopenia     Perineal rash     Type 2 diabetes mellitus     Urinary frequency

## 2021-06-08 NOTE — ED CLERICAL - NS ED CLERK NOTE PRE-ARRIVAL INFORMATION; ADDITIONAL PRE-ARRIVAL INFORMATION
This patient is enrolled in the STARS readmission reduction initiative and has active care navigation. This patient can be followed up by the care navigation team within 24 hours.  To arrange close follow-up or to obtain additional clinical information, please call the care navigation contact number above.  Please page the Orthopedic  PA for input and/or consultation PRIOR to admission. For patients previously on the Hospitalist Service please page the hospitalist at [phone] for input and/or consultation PRIOR to admission. If the patient was on a Voluntary Physician’s service please contact that physician for input and/or consultation PRIOR to admission.

## 2021-06-08 NOTE — ED ADULT NURSE NOTE - OBJECTIVE STATEMENT
76 YOF A&OX3 with pmh of IBS and psh of right hip surgery (2 weeks ago) presents to ED for fever. pt states "I have had on and off fever for 4 days and my right hip pain has worsened." pt states highest temp has been 102, pt has been taking tylenol to decrease temp. pt also expresses epsiodes of nausea. pt rates right hip pain 8/10. pt noted to have bruising of right thigh, no discharge/bleeding noted. pt denies sob, chest pain, v/d, headaches. safety maintained.

## 2021-06-08 NOTE — H&P ADULT - ASSESSMENT
75F CAD, HTN, HLD, DM 2, Anxiety, and s/p Right THR admitted for pyelonephritis, gi bleed    #Pyelonephritis  GMF  continue rocephin  ID consulted  f/u cultures    #GI bleed likely from hemmorhoid  GI consulted Dr. Anny miller for now  monitor H and H at 4pm.     #HTN  continue BP meds with hold parameters    #HLD  continue statins    #Anxiety   continue SSRIs    #DM2  hold oral dm meds  LDISS    #DVT proph  SCDs 2/2 Bleed     75F CAD, HTN, HLD, DM 2, Anxiety, and s/p Right THR admitted for pyelonephritis, gi bleed    #Pyelonephritis  GMF  continue rocephin  ID consulted  f/u cultures    #GI bleed likely from hemmorhoid  GI consulted Dr. Anny miller for now  monitor H and H at 4pm.     #S/P Right THR  notified ortho PA team. Dr. Shay was her surgeon, will be in tomorrow per PA team    #HTN  continue BP meds with hold parameters    #HLD  continue statins    #Anxiety   continue SSRIs    #DM2  hold oral dm meds  LDISS    #DVT proph  SCDs 2/2 Bleed

## 2021-06-08 NOTE — CONSULT NOTE ADULT - SUBJECTIVE AND OBJECTIVE BOX
Chief Complaint:  Patient is a 76y old  Female who presents with a chief complaint of Pyelonephritis, GI bleed (2021 15:58)    Essential hypertension    Hyperlipidemia, unspecified hyperlipidemia type    Type 2 diabetes mellitus without complication, without long-term current use of insulin    Overactive bladder    Anxiety    Environmental allergies    Gastroesophageal reflux disease without esophagitis    Atherosclerotic heart disease    H/O: pneumonia    OAB (overactive bladder)    Arthritis    Diabetes    Cataracts, both eyes    Osteopenia    Neoplasm of uncertain behavior of breast, right    Irritable bowel    COVID-19 vaccine series completed    Type 2 diabetes mellitus    Osteoarthritis    Hyperlipidemia    Coronary artery disease    Urinary frequency    Mixed stress and urge urinary incontinence    Insomnia    Diarrhea    Perineal rash    History of vertigo    Leg swelling    Atypical hyperplasia of right breast    Hearing loss    Obesity, Class I, BMI 30-34.9    H/O cataract extraction    History of right knee joint replacement    H/O lumpectomy    Status post double vessel coronary artery bypass    H/O mitral valve repair    Elbow fracture    History of open reduction and internal fixation (ORIF) procedure       HPI:  75F CAD, HTN, HLD, DM 2, Anxiety, and s/p Right THR is s/p Right THR end of May, home rehab, reports fever of 101-102 daily for the last 3 days, +nausea, denies any vomiting or diarrhea, denies cough or sob, reports mild intermittent abdominal crampiness. reports RLE swelling, denies any pain or redness at surgical site.    In the ED  U/A shows +nitrite and +leuks.  temp 99.3  Hb 7.7, WBC WNL, K3.4  U/S Doppler - no DVT    Rocephin IV x 1 given       (2021 13:28)      amoxicillin (Pruritus)  erythromycin (Pruritus)      atorvastatin 40 milliGRAM(s) Oral at bedtime  cefTRIAXone   IVPB 1000 milliGRAM(s) IV Intermittent every 24 hours  dextrose 40% Gel 15 Gram(s) Oral once  dextrose 5%. 1000 milliLiter(s) IV Continuous <Continuous>  dextrose 5%. 1000 milliLiter(s) IV Continuous <Continuous>  dextrose 50% Injectable 25 Gram(s) IV Push once  dextrose 50% Injectable 12.5 Gram(s) IV Push once  dextrose 50% Injectable 25 Gram(s) IV Push once  diazepam    Tablet 5 milliGRAM(s) Oral at bedtime  FLUoxetine 20 milliGRAM(s) Oral daily  furosemide    Tablet 40 milliGRAM(s) Oral daily  glucagon  Injectable 1 milliGRAM(s) IntraMuscular once  insulin lispro (ADMELOG) corrective regimen sliding scale   SubCutaneous three times a day before meals  oxybutynin 10 milliGRAM(s) Oral daily  oxyCODONE    IR 5 milliGRAM(s) Oral every 4 hours PRN  pantoprazole    Tablet 40 milliGRAM(s) Oral before breakfast  senna 2 Tablet(s) Oral at bedtime PRN        FAMILY HISTORY:  Congestive heart failure (Father)    Family history of breast cancer in mother (Mother)  grandmother and aunt    FH: type 2 diabetes (Father, Sibling)    Family history of osteoarthritis (Mother)          Review of Systems:    General:  No wt loss, fevers, chills, night sweats,fatigue,   Eyes:  Good vision, no reported pain  ENT:  No sore throat, pain, runny nose, dysphagia  CV:  No pain, palpitatioins, hypo/hypertension  Resp:  No dyspnea, cough, tachypnea, wheezing  :  No pain, bleeding, incontinence, nocturia  Muscle:  No pain, weakness  Neuro:  No weakness, tingling, memory problems  Psych:  No fatigue, insomnia, mood problems, depression  Endocrine:  No polyuria, polydypsia, cold/heat intolerance  Heme:  No petechiae, ecchymosis, easy bruisability  Skin:  No rash, tattoos, scars, edema    Relevant Family History:       Relevant Social History:       Physical Exam:    Vital Signs:  Vital Signs Last 24 Hrs  T(C): 37.1 (2021 13:22), Max: 37.4 (2021 09:46)  T(F): 98.8 (2021 13:22), Max: 99.3 (2021 09:46)  HR: 81 (2021 13:22) (81 - 95)  BP: 137/68 (2021 13:22) (137/68 - 144/68)  BP(mean): --  RR: 18 (2021 13:22) (18 - 18)  SpO2: 94% (2021 13:22) (94% - 100%)  Daily Height in cm: 154.94 (2021 09:46)    Daily     General:  Appears stated age, well-groomed, well-nourished, no distress  HEENT:  NC/AT,  conjunctivae clear and pink, no thyromegaly, nodules, adenopathy, no JVD  Chest:  Full & symmetric excursion, no increased effort, breath sounds clear  Cardiovascular:  Regular rhythm, S1, S2, no murmur/rub/S3/S4, no abdominal bruit, no edema  Abdomen:  Soft, non-tender, non-distended, normoactive bowel sounds,  no masses ,no hepatosplenomeagaly, no signs of chronic liver disease  Extremities:  no cyanosis,clubbing or edema  Skin:  No rash/erythema/ecchymoses/petechiae/wounds/abscess/warm/dry  Neuro/Psych:  Alert, oriented, no asterixis, no tremor, no encephalopathy    Laboratory:                            7.7    6.53  )-----------( 206      ( 2021 10:30 )             24.0     06-08    135  |  98  |  14  ----------------------------<  216<H>  3.4<L>   |  25  |  0.67    Ca    9.3      2021 10:30    TPro  7.3  /  Alb  3.3  /  TBili  0.9  /  DBili  x   /  AST  27  /  ALT  27  /  AlkPhos  55  06-08    LIVER FUNCTIONS - ( 2021 10:30 )  Alb: 3.3 g/dL / Pro: 7.3 g/dL / ALK PHOS: 55 U/L / ALT: 27 U/L DA / AST: 27 U/L / GGT: x             Urinalysis Basic - ( 2021 10:30 )    Color: Yellow / Appearance: Slightly Turbid / S.020 / pH: x  Gluc: x / Ketone: Trace  / Bili: Small / Urobili: 4 mg/dL   Blood: x / Protein: 100 mg/dL / Nitrite: Positive   Leuk Esterase: Moderate / RBC: 3-5 /HPF / WBC 6-10   Sq Epi: x / Non Sq Epi: Moderate / Bacteria: Moderate      Amylase Serum--      Lipase [id number]       Ammonia--    Imaging:          
    HPI:  75F CAD, HTN, HLD, DM 2, Anxiety, and s/p Right THR May 24 2021 who presented to the hospital with CC of fever Tmax 102 and nausea at home. Denies chills diarrhea vomiting. No CP SOB. In ED noted to have + UA.     Infectious Disease consult was called to evaluate pt and for antibiotic management.    Past Medical & Surgical Hx:  PAST MEDICAL & SURGICAL HISTORY:  Essential hypertension  Anxiety  Environmental allergies  Gastroesophageal reflux disease without esophagitis  H/O: pneumonia  treated in 2016 as an outpatient for pneumonia  OAB (overactive bladder)  Osteopenia  Irritable bowel  COVID-19 vaccine series completed  pfizer, completed 21  Type 2 diabetes mellitus  Osteoarthritis  Hyperlipidemia  Coronary artery disease  Urinary frequency  Mixed stress and urge urinary incontinence  Insomnia  difficulty falling asleep  Diarrhea  increased in frequency recently  Perineal rash  History of vertigo  last episode 2020  Leg swelling  Atypical hyperplasia of right breast  Hearing loss  left  Obesity, Class I, BMI 30-34.9  H/O cataract extraction    History of right knee joint replacement    H/O lumpectomy  right breast - benign  and  atypical cells  Status post double vessel coronary artery bypass    H/O mitral valve repair    History of open reduction and internal fixation (ORIF) procedure  right elbow       Social History--  EtOH: denies   Tobacco: denies  Drug Use: denies     FAMILY HISTORY:  Congestive heart failure (Father)  Family history of breast cancer in mother (Mother)  grandmother and aunt  FH: type 2 diabetes (Father, Sibling)  Family history of osteoarthritis (Mother)      Allergies  amoxicillin (Pruritus)  erythromycin (Pruritus)    Intolerances  NONE    Home Medications:  acetaminophen 500 mg oral tablet: 2 tab(s) orally every 8 hours (2021 10:23)  Align 4 mg oral capsule: 1 cap(s) orally once a day (2021 10:23)  Allegra D OTC 24HR 180 mg-240 mg oral tablet, extended release: 1 tab(s) orally once a day (2021 10:23)  Astelin 137 mcg/inh nasal spray: 2 spray(s) nasal 2 times a day (2021 10:23)  diazePAM 5 mg oral tablet: 1 tab(s) orally once a day (at bedtime) (2021 10:23)  diphenoxylate-atropine 2.5 mg-0.025 mg oral tablet: 2 tab(s) orally 4 times a day, As Needed - for diarrhea (2021 10:23)  fenofibrate 160 mg oral tablet: 1 tab(s) orally once a day (2021 10:23)  Flonase 50 mcg/inh nasal spray: 1 spray(s) nasal 2 times a day (2021 10:23)  furosemide 40 mg oral tablet: 1 tab(s) orally once a day (2021 10:23)  glimepiride 4 mg oral tablet: 2 tab(s) orally once a day (2021 10:23)  Hair, Skin &amp; Nails 5 mg oral capsule: 1 cap(s) orally once a day (2021 10:23)  meclizine 12.5 mg oral tablet: orally 1 to 4 times a day, As Needed (2021 10:23)  metFORMIN 500 mg oral tablet: 1 tab(s) orally 2 times a day (2021 10:23)  Multiple Vitamins oral tablet: 1 tab(s) orally once a day (2021 10:23)  nystatin 100,000 units/g topical cream: Apply topically to affected area 3 times a day (2021 10:23)  oxybutynin 10 mg/24 hr oral tablet, extended release: 1 tab(s) orally once a day (2021 10:23)  polyethylene glycol 3350 oral powder for reconstitution: 17 gram(s) orally once a day (at bedtime), As Needed - for constipation (2021 10:23)  senna oral tablet: 2 tab(s) orally once a day (at bedtime), As Needed - for constipation (2021 10:23)  Trulicity Pen 1.5 mg/0.5 mL subcutaneous solution: 1 dose(s) subcutaneous once a week  Monday (2021 10:23)  Vitamin C 500 mg oral tablet: 2 tab(s) orally once a day (2021 10:23)  Vitamin D3 2000 intl units (50 mcg) oral tablet: 1 tab(s) orally once a day (2021 10:23)      Current Inpatient Medications :    ANTIBIOTICS:   cefTRIAXone   IVPB 1000 milliGRAM(s) IV Intermittent every 24 hours      OTHER RELEVANT MEDICATIONS :  atorvastatin 40 milliGRAM(s) Oral at bedtime  dextrose 40% Gel 15 Gram(s) Oral once  dextrose 5%. 1000 milliLiter(s) IV Continuous <Continuous>  dextrose 5%. 1000 milliLiter(s) IV Continuous <Continuous>  dextrose 50% Injectable 25 Gram(s) IV Push once  dextrose 50% Injectable 12.5 Gram(s) IV Push once  dextrose 50% Injectable 25 Gram(s) IV Push once  diazepam    Tablet 5 milliGRAM(s) Oral at bedtime  FLUoxetine 20 milliGRAM(s) Oral daily  furosemide    Tablet 40 milliGRAM(s) Oral daily  glucagon  Injectable 1 milliGRAM(s) IntraMuscular once  insulin lispro (ADMELOG) corrective regimen sliding scale   SubCutaneous three times a day before meals  oxybutynin 10 milliGRAM(s) Oral daily  oxyCODONE    IR 5 milliGRAM(s) Oral every 4 hours PRN  pantoprazole    Tablet 40 milliGRAM(s) Oral before breakfast  senna 2 Tablet(s) Oral at bedtime PRN    ROS:  CONSTITUTIONAL:  + fever or chills,   EYES:  Negative  blurry vision or double vision  CARDIOVASCULAR:  Negative for chest pain or palpitations  RESPIRATORY:  Negative for cough, wheezing, or SOB   GASTROINTESTINAL:  Negative for vomiting, diarrhea, constipation, or abdominal pain  +nausea  GENITOURINARY:  Negative frequency, urgency , dysuria or hematuria   NEUROLOGIC:  No headache, confusion, dizziness, lightheadedness  All other systems were reviewed and are negative      Physical Exam:  Vital Signs Last 24 Hrs  T(C): 37.1 (2021 13:22), Max: 37.4 (2021 09:46)  T(F): 98.8 (2021 13:22), Max: 99.3 (2021 09:46)  HR: 81 (2021 13:22) (81 - 95)  BP: 137/68 (2021 13:22) (137/68 - 144/68)  RR: 18 (2021 13:22) (18 - 18)  SpO2: 94% (2021 13:22) (94% - 100%)  Height (cm): 154.9 ( @ 09:46)  Weight (kg): 77.1 ( @ 09:46)  BMI (kg/m2): 32.1 ( @ 09:46)  BSA (m2): 1.76 ( @ 09:46)    General: no acute distress  Neck: supple, trachea midline  Lungs: clear, no wheeze/rhonchi  Cardiovascular: regular rate and rhythm, S1 S2  Abdomen: soft, nontender, ND, bowel sounds normal  Neurological:  alert and oriented x3  Skin: no rash  Extremities: Right hip surgical site c/d/i    Labs:                         7.7    6.53  )-----------( 206      ( 2021 10:30 )             24.0       135  |  98  |  14  ----------------------------<  216<H>  3.4<L>   |  25  |  0.67    Ca    9.3      2021 10:30    TPro  7.3  /  Alb  3.3  /  TBili  0.9  /  DBili  x   /  AST  27  /  ALT  27  /  AlkPhos  55      Urinalysis Basic - ( 2021 10:30 )    Color: Yellow / Appearance: Slightly Turbid / S.020 / pH: x  Gluc: x / Ketone: Trace  / Bili: Small / Urobili: 4 mg/dL   Blood: x / Protein: 100 mg/dL / Nitrite: Positive   Leuk Esterase: Moderate / RBC: 3-5 /HPF / WBC 6-10   Sq Epi: x / Non Sq Epi: Moderate / Bacteria: Moderate        RECENT CULTURES:          RADIOLOGY & ADDITIONAL STUDIES:    EXAM:  XR CHEST PORTABLE URGENT 1V                                  PROCEDURE DATE:  2021          INTERPRETATION:  AP chest on 2021 at 9:52 AM. Patient has fever.    Heart magnified by technique. Sternotomy and prosthetic valve ring again noted.    No lung consolidation or layering effusion is evident.    Assessment :   75F CAD, HTN, HLD, DM 2, Anxiety, and s/p Right THR May 24 2021 admitted with fever and nausea sec UTI      Plan :   Cont Rocephin   Fu cultures  Trend temps and cbc    D/w Dr Pimentel    Continue with present regiment .  Approptiate use of antibiotics and adverse effects reviewed.      I have discussed the above plan of care with patient in detail. She expressed understanding of the treatment plan . Risks, benefits and alternatives discussed in detail. I have asked if she has any questions or concerns and appropriately addressed them to the best of my ability .      > 45 minutes spent in direct patient care reviewing  the notes, lab data/ imaging , discussion with multidisciplinary team. All questions were addressed and answered to the best of my capacity .    Thank you for allowing me to participate in the care of your patient .      Lexii Lazcano MD  Infectious Disease  [phone]

## 2021-06-08 NOTE — ED PROVIDER NOTE - OBJECTIVE STATEMENT
pt s/p Right THR end of May, home rehab, reports fever of 101-102 daily for the last 3 days, +nausea, denies any vomiting or diarrhea, denies cough or sob, reports mild intermittent abdominal crampiness. reports RLE swelling, denies any pain or redness at surgical site.

## 2021-06-08 NOTE — ED ADULT TRIAGE NOTE - CHIEF COMPLAINT QUOTE
"I had hip surgery 5/24/21. I have been having intermittent fevers with nausea. I was suppose to see Ortho yesterday but they won't see me because of fever."

## 2021-06-08 NOTE — ED PROVIDER NOTE - CARE PLAN
Principal Discharge DX:	Fever   Principal Discharge DX:	Fever  Secondary Diagnosis:	Acute pyelonephritis  Secondary Diagnosis:	GI bleed

## 2021-06-08 NOTE — ED ADULT NURSE REASSESSMENT NOTE - NS ED NURSE REASSESS COMMENT FT1
pt appears to be in no acute distress. VSS. pt admitted, awaiting covid swab result. safety maintained.

## 2021-06-08 NOTE — H&P ADULT - NSHPREVIEWOFSYSTEMS_GEN_ALL_CORE
REVIEW OF SYSTEMS:    CONSTITUTIONAL: +dizziness,  + fever, no weight loss, or fatigue  EYES: No eye pain, visual disturbances, or discharge  ENMT:  No difficulty hearing, tinnitus, vertigo; No sinus or throat pain  NECK: No pain or stiffness  RESPIRATORY: No cough, wheezing, chills or hemoptysis; No shortness of breath  CARDIOVASCULAR: No chest pain, palpitations, dizziness, or leg swelling  GASTROINTESTINAL: +bleed from hemorrhoid.  No abdominal or epigastric pain. No nausea, vomiting, or hematemesis; No diarrhea or constipation. No melena or hematochezia.  GENITOURINARY: No dysuria, frequency, hematuria, or incontinence  NEUROLOGICAL: No headaches, memory loss, loss of strength, numbness, or tremors  MUSCULOSKELETAL: No joint pain or swelling; No muscle, back, or extremity pain  PSYCHIATRIC: No depression, anxiety, mood swings, or difficulty sleeping

## 2021-06-08 NOTE — H&P ADULT - HISTORY OF PRESENT ILLNESS
75F CAD, HTN, HLD, DM 2, Anxiety, and s/p Right THR is s/p Right THR end of May, home rehab, reports fever of 101-102 daily for the last 3 days, +nausea, denies any vomiting or diarrhea, denies cough or sob, reports mild intermittent abdominal crampiness. reports RLE swelling, denies any pain or redness at surgical site.    In the ED  U/A shows +nitrite and +leuks.  temp 99.3  Hb 7.7, WBC WNL, K3.4  U/S Doppler - no DVT    Rocephin IV x 1 given

## 2021-06-08 NOTE — ED PROVIDER NOTE - PHYSICAL EXAMINATION
Gen:  alert, awake, no acute distress  HEENT:  atraumatic head, airway clear, pupils equal and round  CV:  rrr, nl S1, S2, no m/r/g  Pulm:  lungs CTA b/l  Abd: s/nt/nd, +BS  Ext:  moving all extremities  Neuro:  grossly intact, no focal deficits  Skin:  clear, dry, intact, surgical incision are C/D/I, no redness, no drainage  Psych: AOx3, normal affect, no apparent risk to self or others

## 2021-06-08 NOTE — ED ADULT NURSE NOTE - PRO INTERPRETER NEED 2
English
I personally performed the service described in the documentation recorded by the scribe in my presence, and it accurately and completely records my words and actions.

## 2021-06-08 NOTE — ED PROVIDER NOTE - PSH
H/O cataract extraction  2005  H/O lumpectomy  right breast - benign 1993 and 2020 atypical cells  H/O mitral valve repair  2015  History of open reduction and internal fixation (ORIF) procedure  right elbow 2019  History of right knee joint replacement  2005  Status post double vessel coronary artery bypass  2015

## 2021-06-09 ENCOUNTER — TRANSCRIPTION ENCOUNTER (OUTPATIENT)
Age: 76
End: 2021-06-09

## 2021-06-09 LAB
ALBUMIN SERPL ELPH-MCNC: 3 G/DL — LOW (ref 3.3–5)
ALP SERPL-CCNC: 51 U/L — SIGNIFICANT CHANGE UP (ref 30–120)
ALT FLD-CCNC: 27 U/L DA — SIGNIFICANT CHANGE UP (ref 10–60)
ANION GAP SERPL CALC-SCNC: 7 MMOL/L — SIGNIFICANT CHANGE UP (ref 5–17)
AST SERPL-CCNC: 22 U/L — SIGNIFICANT CHANGE UP (ref 10–40)
BASOPHILS # BLD AUTO: 0 K/UL — SIGNIFICANT CHANGE UP (ref 0–0.2)
BASOPHILS # BLD AUTO: 0.02 K/UL — SIGNIFICANT CHANGE UP (ref 0–0.2)
BASOPHILS NFR BLD AUTO: 0 % — SIGNIFICANT CHANGE UP (ref 0–2)
BASOPHILS NFR BLD AUTO: 0.3 % — SIGNIFICANT CHANGE UP (ref 0–2)
BILIRUB SERPL-MCNC: 0.8 MG/DL — SIGNIFICANT CHANGE UP (ref 0.2–1.2)
BUN SERPL-MCNC: 12 MG/DL — SIGNIFICANT CHANGE UP (ref 7–23)
CALCIUM SERPL-MCNC: 9 MG/DL — SIGNIFICANT CHANGE UP (ref 8.4–10.5)
CHLORIDE SERPL-SCNC: 101 MMOL/L — SIGNIFICANT CHANGE UP (ref 96–108)
CO2 SERPL-SCNC: 29 MMOL/L — SIGNIFICANT CHANGE UP (ref 22–31)
COVID-19 SPIKE DOMAIN AB INTERP: POSITIVE
COVID-19 SPIKE DOMAIN ANTIBODY RESULT: >250 U/ML — HIGH
CREAT SERPL-MCNC: 0.55 MG/DL — SIGNIFICANT CHANGE UP (ref 0.5–1.3)
CULTURE RESULTS: SIGNIFICANT CHANGE UP
EOSINOPHIL # BLD AUTO: 0 K/UL — SIGNIFICANT CHANGE UP (ref 0–0.5)
EOSINOPHIL # BLD AUTO: 0.09 K/UL — SIGNIFICANT CHANGE UP (ref 0–0.5)
EOSINOPHIL NFR BLD AUTO: 0 % — SIGNIFICANT CHANGE UP (ref 0–6)
EOSINOPHIL NFR BLD AUTO: 1.4 % — SIGNIFICANT CHANGE UP (ref 0–6)
GLUCOSE BLDC GLUCOMTR-MCNC: 123 MG/DL — HIGH (ref 70–99)
GLUCOSE BLDC GLUCOMTR-MCNC: 129 MG/DL — HIGH (ref 70–99)
GLUCOSE BLDC GLUCOMTR-MCNC: 148 MG/DL — HIGH (ref 70–99)
GLUCOSE BLDC GLUCOMTR-MCNC: 159 MG/DL — HIGH (ref 70–99)
GLUCOSE BLDC GLUCOMTR-MCNC: 173 MG/DL — HIGH (ref 70–99)
GLUCOSE SERPL-MCNC: 147 MG/DL — HIGH (ref 70–99)
HCT VFR BLD CALC: 24.6 % — LOW (ref 34.5–45)
HCT VFR BLD CALC: 25.7 % — LOW (ref 34.5–45)
HCV AB S/CO SERPL IA: 0.14 S/CO — SIGNIFICANT CHANGE UP (ref 0–0.99)
HCV AB SERPL-IMP: SIGNIFICANT CHANGE UP
HGB BLD-MCNC: 7.9 G/DL — LOW (ref 11.5–15.5)
HGB BLD-MCNC: 8.3 G/DL — LOW (ref 11.5–15.5)
IMM GRANULOCYTES NFR BLD AUTO: 7.3 % — HIGH (ref 0–1.5)
LYMPHOCYTES # BLD AUTO: 1.53 K/UL — SIGNIFICANT CHANGE UP (ref 1–3.3)
LYMPHOCYTES # BLD AUTO: 2.43 K/UL — SIGNIFICANT CHANGE UP (ref 1–3.3)
LYMPHOCYTES # BLD AUTO: 23.4 % — SIGNIFICANT CHANGE UP (ref 13–44)
LYMPHOCYTES # BLD AUTO: 42 % — SIGNIFICANT CHANGE UP (ref 13–44)
MANUAL SMEAR VERIFICATION: SIGNIFICANT CHANGE UP
MCHC RBC-ENTMCNC: 27.5 PG — SIGNIFICANT CHANGE UP (ref 27–34)
MCHC RBC-ENTMCNC: 27.6 PG — SIGNIFICANT CHANGE UP (ref 27–34)
MCHC RBC-ENTMCNC: 32.1 GM/DL — SIGNIFICANT CHANGE UP (ref 32–36)
MCHC RBC-ENTMCNC: 32.3 GM/DL — SIGNIFICANT CHANGE UP (ref 32–36)
MCV RBC AUTO: 85.4 FL — SIGNIFICANT CHANGE UP (ref 80–100)
MCV RBC AUTO: 85.7 FL — SIGNIFICANT CHANGE UP (ref 80–100)
MONOCYTES # BLD AUTO: 0.29 K/UL — SIGNIFICANT CHANGE UP (ref 0–0.9)
MONOCYTES # BLD AUTO: 0.86 K/UL — SIGNIFICANT CHANGE UP (ref 0–0.9)
MONOCYTES NFR BLD AUTO: 13.1 % — SIGNIFICANT CHANGE UP (ref 2–14)
MONOCYTES NFR BLD AUTO: 5 % — SIGNIFICANT CHANGE UP (ref 2–14)
NEUTROPHILS # BLD AUTO: 2.78 K/UL — SIGNIFICANT CHANGE UP (ref 1.8–7.4)
NEUTROPHILS # BLD AUTO: 3.56 K/UL — SIGNIFICANT CHANGE UP (ref 1.8–7.4)
NEUTROPHILS NFR BLD AUTO: 46 % — SIGNIFICANT CHANGE UP (ref 43–77)
NEUTROPHILS NFR BLD AUTO: 54.5 % — SIGNIFICANT CHANGE UP (ref 43–77)
NEUTS BAND # BLD: 2 % — SIGNIFICANT CHANGE UP (ref 0–8)
NRBC # BLD: 0 /100 WBCS — SIGNIFICANT CHANGE UP (ref 0–0)
NRBC # BLD: 0 — SIGNIFICANT CHANGE UP
NRBC # BLD: SIGNIFICANT CHANGE UP /100 WBCS (ref 0–0)
PLAT MORPH BLD: NORMAL — SIGNIFICANT CHANGE UP
PLATELET # BLD AUTO: 206 K/UL — SIGNIFICANT CHANGE UP (ref 150–400)
PLATELET # BLD AUTO: 218 K/UL — SIGNIFICANT CHANGE UP (ref 150–400)
POTASSIUM SERPL-MCNC: 3.5 MMOL/L — SIGNIFICANT CHANGE UP (ref 3.5–5.3)
POTASSIUM SERPL-SCNC: 3.5 MMOL/L — SIGNIFICANT CHANGE UP (ref 3.5–5.3)
PROT SERPL-MCNC: 6.9 G/DL — SIGNIFICANT CHANGE UP (ref 6–8.3)
RBC # BLD: 2.87 M/UL — LOW (ref 3.8–5.2)
RBC # BLD: 3.01 M/UL — LOW (ref 3.8–5.2)
RBC # FLD: 14.2 % — SIGNIFICANT CHANGE UP (ref 10.3–14.5)
RBC # FLD: 14.3 % — SIGNIFICANT CHANGE UP (ref 10.3–14.5)
RBC BLD AUTO: NORMAL — SIGNIFICANT CHANGE UP
SARS-COV-2 IGG+IGM SERPL QL IA: >250 U/ML — HIGH
SARS-COV-2 IGG+IGM SERPL QL IA: POSITIVE
SODIUM SERPL-SCNC: 137 MMOL/L — SIGNIFICANT CHANGE UP (ref 135–145)
SPECIMEN SOURCE: SIGNIFICANT CHANGE UP
VARIANT LYMPHS # BLD: 5 % — SIGNIFICANT CHANGE UP (ref 0–6)
WBC # BLD: 5.79 K/UL — SIGNIFICANT CHANGE UP (ref 3.8–10.5)
WBC # BLD: 6.54 K/UL — SIGNIFICANT CHANGE UP (ref 3.8–10.5)
WBC # FLD AUTO: 5.79 K/UL — SIGNIFICANT CHANGE UP (ref 3.8–10.5)
WBC # FLD AUTO: 6.54 K/UL — SIGNIFICANT CHANGE UP (ref 3.8–10.5)

## 2021-06-09 PROCEDURE — 74176 CT ABD & PELVIS W/O CONTRAST: CPT | Mod: 26

## 2021-06-09 PROCEDURE — 99233 SBSQ HOSP IP/OBS HIGH 50: CPT

## 2021-06-09 RX ORDER — FLUTICASONE PROPIONATE 50 MCG
1 SPRAY, SUSPENSION NASAL
Refills: 0 | Status: DISCONTINUED | OUTPATIENT
Start: 2021-06-09 | End: 2021-06-12

## 2021-06-09 RX ORDER — LORATADINE 10 MG/1
10 TABLET ORAL DAILY
Refills: 0 | Status: DISCONTINUED | OUTPATIENT
Start: 2021-06-09 | End: 2021-06-12

## 2021-06-09 RX ADMIN — LORATADINE 10 MILLIGRAM(S): 10 TABLET ORAL at 12:54

## 2021-06-09 RX ADMIN — Medication 1: at 12:53

## 2021-06-09 RX ADMIN — Medication 10 MILLIGRAM(S): at 11:12

## 2021-06-09 RX ADMIN — OXYCODONE HYDROCHLORIDE 5 MILLIGRAM(S): 5 TABLET ORAL at 20:56

## 2021-06-09 RX ADMIN — Medication 1 SPRAY(S): at 13:00

## 2021-06-09 RX ADMIN — OXYCODONE HYDROCHLORIDE 5 MILLIGRAM(S): 5 TABLET ORAL at 12:59

## 2021-06-09 RX ADMIN — Medication 40 MILLIGRAM(S): at 11:11

## 2021-06-09 RX ADMIN — OXYCODONE HYDROCHLORIDE 5 MILLIGRAM(S): 5 TABLET ORAL at 20:04

## 2021-06-09 RX ADMIN — PANTOPRAZOLE SODIUM 40 MILLIGRAM(S): 20 TABLET, DELAYED RELEASE ORAL at 06:11

## 2021-06-09 RX ADMIN — Medication 5 MILLIGRAM(S): at 21:43

## 2021-06-09 RX ADMIN — Medication 20 MILLIGRAM(S): at 11:12

## 2021-06-09 RX ADMIN — ATORVASTATIN CALCIUM 40 MILLIGRAM(S): 80 TABLET, FILM COATED ORAL at 21:43

## 2021-06-09 RX ADMIN — CEFTRIAXONE 100 MILLIGRAM(S): 500 INJECTION, POWDER, FOR SOLUTION INTRAMUSCULAR; INTRAVENOUS at 11:11

## 2021-06-09 RX ADMIN — OXYCODONE HYDROCHLORIDE 5 MILLIGRAM(S): 5 TABLET ORAL at 13:41

## 2021-06-09 NOTE — DISCHARGE NOTE NURSING/CASE MANAGEMENT/SOCIAL WORK - PATIENT PORTAL LINK FT
You can access the FollowMyHealth Patient Portal offered by [facility] by registering at the following website: [email or web address]. By joining [facility]’s FollowMyHealth portal, you will also be able to view your health information using other applications (apps) compatible with our system.

## 2021-06-09 NOTE — DISCHARGE NOTE NURSING/CASE MANAGEMENT/SOCIAL WORK - NSSCNAMETXT_GEN_ALL_CORE
[facility] [phone] or  [phone]  Nurse to visit the day after hospital discharge; physical therapist to follow. Please contact the home care agency at the above phone number if you have not heard from them by 12 noon on the day after your hospital discharge.

## 2021-06-09 NOTE — PROGRESS NOTE ADULT - ASSESSMENT
Diarrhea/Ab pain/LGIB- this is likely a diverticular bleed which has since slowed down- likely exacerbated by Eliquis  CT noted    IVF  IVAB per ID  monitor cbc Q12, transfuse if Hgb <8  maintain T&S  will resume diet today, if she continues to bleed and drop counts significantly, may need colonoscopy    Solange Fisher MD  Gastroenterology  [address] [address] [phone]    ACP (advance care planning). Plan: Advanced care planning was discussed with patient and family.  Advanced care planning forms were reviewed and discussed.  Risks, benefits and alternatives of gastroenterologic procedures were discussed in detail and all questions were answered.    30 minutes spent.   Diarrhea/Ab pain/LGIB- this is likely a diverticular bleed which has since slowed down- likely exacerbated by Eliquis  CT noted    IVF  IVAB per ID  monitor cbc Q12, transfuse if Hgb <8  maintain T&S  will resume full liquid diet today, if she continues to bleed and drop counts significantly, may need colonoscopy    Solange Fisher MD  Gastroenterology  [address] [address] [phone]    ACP (advance care planning). Plan: Advanced care planning was discussed with patient and family.  Advanced care planning forms were reviewed and discussed.  Risks, benefits and alternatives of gastroenterologic procedures were discussed in detail and all questions were answered.    30 minutes spent.

## 2021-06-09 NOTE — DISCHARGE NOTE NURSING/CASE MANAGEMENT/SOCIAL WORK - NSDCVIVACCINE_GEN_ALL_CORE_FT
You are here for follow up of your diabetes and hypothyroidism.     Your download is looking better. The last few days your blood sugars have been looking higher.     If you have 2 blood sugars in a row that are not responding to boluses - it means you NEED to change your infusion sites.     You also need to be sure you are changing your infusion sites at a minimum of every 3 days.     Dr. Grey would like you to go to diabetes education to work on carb counting and trouble shooting your diabetes. It has been since 2015 since you have worked with them.    Component      Latest Ref Rng & Units 9/22/2017   GLYCOHEMOGLOBIN A1C      4.5 - 5.6 % 7.6 (H)        I will place a referral to diabetes education and their office will call you to set up an appointment.     We will not adjust your medications today. Please follow up with diabetes education.     Return to see Dr. Grey in 1 month.   No lab work is needed.         
Influenza , 2016/11/6 11:37 , Jay CliftonRN)

## 2021-06-10 ENCOUNTER — TRANSCRIPTION ENCOUNTER (OUTPATIENT)
Age: 76
End: 2021-06-10

## 2021-06-10 LAB
ANION GAP SERPL CALC-SCNC: 10 MMOL/L — SIGNIFICANT CHANGE UP (ref 5–17)
BUN SERPL-MCNC: 7 MG/DL — SIGNIFICANT CHANGE UP (ref 7–23)
CALCIUM SERPL-MCNC: 8.7 MG/DL — SIGNIFICANT CHANGE UP (ref 8.4–10.5)
CHLORIDE SERPL-SCNC: 103 MMOL/L — SIGNIFICANT CHANGE UP (ref 96–108)
CO2 SERPL-SCNC: 27 MMOL/L — SIGNIFICANT CHANGE UP (ref 22–31)
CREAT SERPL-MCNC: 0.44 MG/DL — LOW (ref 0.5–1.3)
GLUCOSE BLDC GLUCOMTR-MCNC: 131 MG/DL — HIGH (ref 70–99)
GLUCOSE BLDC GLUCOMTR-MCNC: 138 MG/DL — HIGH (ref 70–99)
GLUCOSE BLDC GLUCOMTR-MCNC: 143 MG/DL — HIGH (ref 70–99)
GLUCOSE BLDC GLUCOMTR-MCNC: 190 MG/DL — HIGH (ref 70–99)
GLUCOSE SERPL-MCNC: 150 MG/DL — HIGH (ref 70–99)
HCT VFR BLD CALC: 25.7 % — LOW (ref 34.5–45)
HCT VFR BLD CALC: 27.1 % — LOW (ref 34.5–45)
HGB BLD-MCNC: 8.3 G/DL — LOW (ref 11.5–15.5)
HGB BLD-MCNC: 8.7 G/DL — LOW (ref 11.5–15.5)
MCHC RBC-ENTMCNC: 27.6 PG — SIGNIFICANT CHANGE UP (ref 27–34)
MCHC RBC-ENTMCNC: 27.9 PG — SIGNIFICANT CHANGE UP (ref 27–34)
MCHC RBC-ENTMCNC: 32.1 GM/DL — SIGNIFICANT CHANGE UP (ref 32–36)
MCHC RBC-ENTMCNC: 32.3 GM/DL — SIGNIFICANT CHANGE UP (ref 32–36)
MCV RBC AUTO: 85.4 FL — SIGNIFICANT CHANGE UP (ref 80–100)
MCV RBC AUTO: 86.9 FL — SIGNIFICANT CHANGE UP (ref 80–100)
NRBC # BLD: 0 /100 WBCS — SIGNIFICANT CHANGE UP (ref 0–0)
NRBC # BLD: 0 /100 WBCS — SIGNIFICANT CHANGE UP (ref 0–0)
PLATELET # BLD AUTO: 226 K/UL — SIGNIFICANT CHANGE UP (ref 150–400)
PLATELET # BLD AUTO: 241 K/UL — SIGNIFICANT CHANGE UP (ref 150–400)
POTASSIUM SERPL-MCNC: 3.3 MMOL/L — LOW (ref 3.5–5.3)
POTASSIUM SERPL-SCNC: 3.3 MMOL/L — LOW (ref 3.5–5.3)
RBC # BLD: 3.01 M/UL — LOW (ref 3.8–5.2)
RBC # BLD: 3.12 M/UL — LOW (ref 3.8–5.2)
RBC # FLD: 14.4 % — SIGNIFICANT CHANGE UP (ref 10.3–14.5)
RBC # FLD: 14.4 % — SIGNIFICANT CHANGE UP (ref 10.3–14.5)
SODIUM SERPL-SCNC: 140 MMOL/L — SIGNIFICANT CHANGE UP (ref 135–145)
WBC # BLD: 5.21 K/UL — SIGNIFICANT CHANGE UP (ref 3.8–10.5)
WBC # BLD: 5.56 K/UL — SIGNIFICANT CHANGE UP (ref 3.8–10.5)
WBC # FLD AUTO: 5.21 K/UL — SIGNIFICANT CHANGE UP (ref 3.8–10.5)
WBC # FLD AUTO: 5.56 K/UL — SIGNIFICANT CHANGE UP (ref 3.8–10.5)

## 2021-06-10 PROCEDURE — 99233 SBSQ HOSP IP/OBS HIGH 50: CPT

## 2021-06-10 RX ORDER — POTASSIUM CHLORIDE 20 MEQ
40 PACKET (EA) ORAL ONCE
Refills: 0 | Status: DISCONTINUED | OUTPATIENT
Start: 2021-06-10 | End: 2021-06-10

## 2021-06-10 RX ORDER — POTASSIUM CHLORIDE 20 MEQ
40 PACKET (EA) ORAL ONCE
Refills: 0 | Status: COMPLETED | OUTPATIENT
Start: 2021-06-10 | End: 2021-06-10

## 2021-06-10 RX ORDER — SOD SULF/SODIUM/NAHCO3/KCL/PEG
1000 SOLUTION, RECONSTITUTED, ORAL ORAL EVERY 6 HOURS
Refills: 0 | Status: COMPLETED | OUTPATIENT
Start: 2021-06-10 | End: 2021-06-11

## 2021-06-10 RX ADMIN — PANTOPRAZOLE SODIUM 40 MILLIGRAM(S): 20 TABLET, DELAYED RELEASE ORAL at 06:56

## 2021-06-10 RX ADMIN — Medication 1: at 08:34

## 2021-06-10 RX ADMIN — Medication 1 SPRAY(S): at 06:56

## 2021-06-10 RX ADMIN — Medication 40 MILLIGRAM(S): at 06:56

## 2021-06-10 RX ADMIN — Medication 1000 MILLILITER(S): at 17:59

## 2021-06-10 RX ADMIN — LORATADINE 10 MILLIGRAM(S): 10 TABLET ORAL at 12:58

## 2021-06-10 RX ADMIN — Medication 10 MILLIGRAM(S): at 12:59

## 2021-06-10 RX ADMIN — ATORVASTATIN CALCIUM 40 MILLIGRAM(S): 80 TABLET, FILM COATED ORAL at 21:48

## 2021-06-10 RX ADMIN — Medication 40 MILLIEQUIVALENT(S): at 12:59

## 2021-06-10 RX ADMIN — Medication 20 MILLIGRAM(S): at 12:58

## 2021-06-10 RX ADMIN — CEFTRIAXONE 100 MILLIGRAM(S): 500 INJECTION, POWDER, FOR SOLUTION INTRAMUSCULAR; INTRAVENOUS at 12:58

## 2021-06-10 RX ADMIN — Medication 1 SPRAY(S): at 17:59

## 2021-06-10 NOTE — PROGRESS NOTE ADULT - ASSESSMENT
Diarrhea/Ab pain/LGIB- this is likely a diverticular bleed which has since slowed down- likely exacerbated by Eliquis  CT noted  pyelonephritis on Ceftriaxone    IVF  IVAB per ID  monitor cbc Q12, transfuse if Hgb <8  maintain T&S  Plan for colonoscopy tomorrow   Moviprep ordered     Solange Fisher MD  Gastroenterology  [facility]  [address] [place] [address] [phone]    ACP (advance care planning). Plan: Advanced care planning was discussed with patient and family.  Advanced care planning forms were reviewed and discussed.  Risks, benefits and alternatives of gastroenterologic procedures were discussed in detail and all questions were answered.    30 minutes spent.

## 2021-06-10 NOTE — PHYSICAL THERAPY INITIAL EVALUATION ADULT - PERTINENT HX OF CURRENT PROBLEM, REHAB EVAL
75F CAD, HTN, HLD, DM 2, Anxiety, and s/p Right THR is s/p Right THR end of May, home rehab, reports fever of 101-102 daily for the last 3 days, +nausea, denies any vomiting or diarrhea, denies cough or sob, reports mild intermittent abdominal crampiness. reports RLE swelling, denies any pain or redness at surgical site.

## 2021-06-10 NOTE — PROGRESS NOTE ADULT - ASSESSMENT
75F CAD, HTN, HLD, DM 2, Anxiety, and s/p Right THR admitted for pyelonephritis, gi bleed    #Pyelonephritis  GMF  continue rocephin  ID consulted  f/u cultures    #GI bleed likely from hemmorhoid  GI is following.  Plan for colonoscopy in AM.  hol eliquis for now  monitor H and H at 6pm. transfuse if Hgb <8    #S/P Right THR  notified ortho PA team. Dr. Shay was her surgeon, will be in tomorrow per PA team    #HTN  continue BP meds with hold parameters    #HLD  continue statins    #Anxiety   continue SSRIs    #DM2  hold oral dm meds  LDISS    #DVT proph  SCDs 2/2 Bleed      Plan of care was discuss with patient, all questions were answered, seems understand and in agreement.

## 2021-06-10 NOTE — PHYSICAL THERAPY INITIAL EVALUATION ADULT - ADDITIONAL COMMENTS
Lives in a house with 2 steps to enter. Owns a RW, cammode and cane. Will have assistance from spouse.

## 2021-06-11 ENCOUNTER — TRANSCRIPTION ENCOUNTER (OUTPATIENT)
Age: 76
End: 2021-06-11

## 2021-06-11 LAB
ANION GAP SERPL CALC-SCNC: 10 MMOL/L — SIGNIFICANT CHANGE UP (ref 5–17)
BUN SERPL-MCNC: 7 MG/DL — SIGNIFICANT CHANGE UP (ref 7–23)
CALCIUM SERPL-MCNC: 9.1 MG/DL — SIGNIFICANT CHANGE UP (ref 8.4–10.5)
CHLORIDE SERPL-SCNC: 104 MMOL/L — SIGNIFICANT CHANGE UP (ref 96–108)
CO2 SERPL-SCNC: 25 MMOL/L — SIGNIFICANT CHANGE UP (ref 22–31)
CREAT SERPL-MCNC: 0.48 MG/DL — LOW (ref 0.5–1.3)
GLUCOSE BLDC GLUCOMTR-MCNC: 120 MG/DL — HIGH (ref 70–99)
GLUCOSE BLDC GLUCOMTR-MCNC: 143 MG/DL — HIGH (ref 70–99)
GLUCOSE BLDC GLUCOMTR-MCNC: 187 MG/DL — HIGH (ref 70–99)
GLUCOSE BLDC GLUCOMTR-MCNC: 196 MG/DL — HIGH (ref 70–99)
GLUCOSE SERPL-MCNC: 176 MG/DL — HIGH (ref 70–99)
HCT VFR BLD CALC: 27.2 % — LOW (ref 34.5–45)
HGB BLD-MCNC: 8.7 G/DL — LOW (ref 11.5–15.5)
MCHC RBC-ENTMCNC: 27.5 PG — SIGNIFICANT CHANGE UP (ref 27–34)
MCHC RBC-ENTMCNC: 32 GM/DL — SIGNIFICANT CHANGE UP (ref 32–36)
MCV RBC AUTO: 86.1 FL — SIGNIFICANT CHANGE UP (ref 80–100)
NRBC # BLD: 0 /100 WBCS — SIGNIFICANT CHANGE UP (ref 0–0)
PLATELET # BLD AUTO: 264 K/UL — SIGNIFICANT CHANGE UP (ref 150–400)
POTASSIUM SERPL-MCNC: 3.4 MMOL/L — LOW (ref 3.5–5.3)
POTASSIUM SERPL-SCNC: 3.4 MMOL/L — LOW (ref 3.5–5.3)
RBC # BLD: 3.16 M/UL — LOW (ref 3.8–5.2)
RBC # FLD: 14.4 % — SIGNIFICANT CHANGE UP (ref 10.3–14.5)
SODIUM SERPL-SCNC: 139 MMOL/L — SIGNIFICANT CHANGE UP (ref 135–145)
WBC # BLD: 6.04 K/UL — SIGNIFICANT CHANGE UP (ref 3.8–10.5)
WBC # FLD AUTO: 6.04 K/UL — SIGNIFICANT CHANGE UP (ref 3.8–10.5)

## 2021-06-11 PROCEDURE — 99233 SBSQ HOSP IP/OBS HIGH 50: CPT

## 2021-06-11 RX ORDER — POTASSIUM CHLORIDE 20 MEQ
10 PACKET (EA) ORAL
Refills: 0 | Status: COMPLETED | OUTPATIENT
Start: 2021-06-11 | End: 2021-06-11

## 2021-06-11 RX ORDER — SODIUM CHLORIDE 9 MG/ML
1000 INJECTION, SOLUTION INTRAVENOUS
Refills: 0 | Status: DISCONTINUED | OUTPATIENT
Start: 2021-06-11 | End: 2021-06-11

## 2021-06-11 RX ORDER — SODIUM CHLORIDE 9 MG/ML
1000 INJECTION, SOLUTION INTRAVENOUS
Refills: 0 | Status: DISCONTINUED | OUTPATIENT
Start: 2021-06-11 | End: 2021-06-12

## 2021-06-11 RX ORDER — HYDROMORPHONE HYDROCHLORIDE 2 MG/ML
0.5 INJECTION INTRAMUSCULAR; INTRAVENOUS; SUBCUTANEOUS
Refills: 0 | Status: DISCONTINUED | OUTPATIENT
Start: 2021-06-11 | End: 2021-06-11

## 2021-06-11 RX ORDER — ONDANSETRON 8 MG/1
4 TABLET, FILM COATED ORAL ONCE
Refills: 0 | Status: DISCONTINUED | OUTPATIENT
Start: 2021-06-11 | End: 2021-06-11

## 2021-06-11 RX ORDER — OXYCODONE HYDROCHLORIDE 5 MG/1
5 TABLET ORAL ONCE
Refills: 0 | Status: DISCONTINUED | OUTPATIENT
Start: 2021-06-11 | End: 2021-06-11

## 2021-06-11 RX ORDER — PSYLLIUM SEED (WITH DEXTROSE)
1 POWDER (GRAM) ORAL DAILY
Refills: 0 | Status: DISCONTINUED | OUTPATIENT
Start: 2021-06-11 | End: 2021-06-12

## 2021-06-11 RX ORDER — METOPROLOL TARTRATE 50 MG
150 TABLET ORAL DAILY
Refills: 0 | Status: DISCONTINUED | OUTPATIENT
Start: 2021-06-11 | End: 2021-06-12

## 2021-06-11 RX ORDER — HYDROCORTISONE 1 %
1 OINTMENT (GRAM) TOPICAL DAILY
Refills: 0 | Status: DISCONTINUED | OUTPATIENT
Start: 2021-06-11 | End: 2021-06-12

## 2021-06-11 RX ADMIN — Medication 150 MILLIGRAM(S): at 09:05

## 2021-06-11 RX ADMIN — Medication 1: at 12:52

## 2021-06-11 RX ADMIN — SODIUM CHLORIDE 75 MILLILITER(S): 9 INJECTION, SOLUTION INTRAVENOUS at 17:09

## 2021-06-11 RX ADMIN — Medication 100 MILLIEQUIVALENT(S): at 09:06

## 2021-06-11 RX ADMIN — Medication 1 APPLICATION(S): at 21:03

## 2021-06-11 RX ADMIN — Medication 100 MILLIEQUIVALENT(S): at 10:32

## 2021-06-11 RX ADMIN — Medication 5 MILLIGRAM(S): at 00:09

## 2021-06-11 RX ADMIN — ATORVASTATIN CALCIUM 40 MILLIGRAM(S): 80 TABLET, FILM COATED ORAL at 21:03

## 2021-06-11 RX ADMIN — Medication 1 SUPPOSITORY(S): at 21:03

## 2021-06-11 RX ADMIN — Medication 1000 MILLILITER(S): at 01:13

## 2021-06-11 RX ADMIN — Medication 1: at 09:14

## 2021-06-11 RX ADMIN — CEFTRIAXONE 100 MILLIGRAM(S): 500 INJECTION, POWDER, FOR SOLUTION INTRAMUSCULAR; INTRAVENOUS at 12:08

## 2021-06-11 RX ADMIN — Medication 40 MILLIGRAM(S): at 09:05

## 2021-06-11 RX ADMIN — SODIUM CHLORIDE 50 MILLILITER(S): 9 INJECTION, SOLUTION INTRAVENOUS at 08:55

## 2021-06-11 RX ADMIN — Medication 1 SPRAY(S): at 17:56

## 2021-06-11 RX ADMIN — Medication 5 MILLIGRAM(S): at 21:03

## 2021-06-11 RX ADMIN — SODIUM CHLORIDE 50 MILLILITER(S): 9 INJECTION, SOLUTION INTRAVENOUS at 21:06

## 2021-06-11 NOTE — DISCHARGE NOTE PROVIDER - HOSPITAL COURSE
75F CAD, HTN, HLD, DM 2, Anxiety, and s/p Right THR admitted for pyelonephritis, gi bleed    #Pyelonephritis  urine culture NGTD  seen by ID, D/C IV antibiotics.  s/p rocephin      #GI bleed likely from hemorrhoid  s/p  colonosocpy with findings of divertcxiuli and bleeding hemorrhoids  plan  sitz bath  hydrocortsione suppository  ppi once a day  add fiber to the diet.  f/u GI in 7 days.    #S/P Right THR  Palan as per orho.    #HTN  continue BP meds with hold parameters    #HLD  continue statins    #Anxiety   continue SSRIs    #DM2  continue home meds.      T(C): 37.1 (06-11-21 @ 22:08), Max: 37.2 (06-11-21 @ 16:30)  HR: 75 (06-11-21 @ 22:08) (75 - 80)  BP: 141/63 (06-11-21 @ 22:08) (134/76 - 157/70)  RR: 18 (06-11-21 @ 22:08) (15 - 19)  SpO2: 97% (06-11-21 @ 22:08) (95% - 100%)  Wt(kg): --  REVIEW OF SYSTEMS:    CONSTITUTIONAL: No weakness, fevers or chills  EYES/ENT: No visual changes;  No vertigo or throat pain   NECK: No pain or stiffness  RESPIRATORY: No cough, wheezing, hemoptysis; No shortness of breath  CARDIOVASCULAR: No chest pain or palpitations  GASTROINTESTINAL: No abdominal or epigastric pain. No nausea, vomiting, or hematemesis; No diarrhea or constipation. No melena or hematochezia.  GENITOURINARY: No dysuria, frequency or hematuria  NEUROLOGICAL: No numbness or weakness  SKIN: No itching, burning, rashes, or lesions   All other review of systems is negative unless indicated above.  PHYSICAL EXAM:  GENERAL: NAD, well-groomed, well-developed  HEAD:  Atraumatic, Normocephalic  EYES: EOMI, PERRLA, conjunctiva and sclera clear  ENMT: No tonsillar erythema, exudates, or enlargement; Moist mucous membranes, Good dentition, No lesions  NECK: Supple, No JVD, Normal thyroid  NERVOUS SYSTEM:  Alert & Oriented X3, Good concentration; Motor Strength 5/5 B/L upper and lower extremities; DTRs 2+ intact and symmetric  CHEST/LUNG: Clear to auscultation bilaterally; No rales, rhonchi, wheezing, or rubs  HEART: Regular rate and rhythm; No murmurs, rubs, or gallops  ABDOMEN: Soft, Nontender, Nondistended; Bowel sounds present  EXTREMITIES:  2+ Peripheral Pulses, No clubbing or cyanosis  SKIN: No rashes or lesions  INCISION: dry and intact    Time spent-60 minutes.       75F CAD, HTN, HLD, DM 2, Anxiety, and s/p Right THR admitted for pyelonephritis, gi bleed    #Pyelonephritis  urine culture NGTD  seen by ID, D/C IV antibiotics.  s/p rocephin      #GI bleed likely from hemorrhoid  s/p  colonosocpy with findings of divertcxiuli and bleeding hemorrhoids  plan  sitz bath  hydrocortsione suppository  ppi once a day  add fiber to the diet.  f/u GI in 7 days.    #S/P Right THR  Palan as per orho.    #HTN  continue BP meds with hold parameters    #HLD  continue statins    #Anxiety   continue SSRIs    #DM2  continue home meds.      T(C): 37.1 (06-11-21 @ 22:08), Max: 37.2 (06-11-21 @ 16:30)  HR: 75 (06-11-21 @ 22:08) (75 - 80)  BP: 141/63 (06-11-21 @ 22:08) (134/76 - 157/70)  RR: 18 (06-11-21 @ 22:08) (15 - 19)  SpO2: 97% (06-11-21 @ 22:08) (95% - 100%)  Wt(kg): --  REVIEW OF SYSTEMS:    CONSTITUTIONAL: No weakness, fevers or chills  EYES/ENT: No visual changes;  No vertigo or throat pain   NECK: No pain or stiffness  RESPIRATORY: No cough, wheezing, hemoptysis; No shortness of breath  CARDIOVASCULAR: No chest pain or palpitations  GASTROINTESTINAL: No abdominal or epigastric pain. No nausea, vomiting, or hematemesis; No diarrhea or constipation. No melena or hematochezia.  GENITOURINARY: No dysuria, frequency or hematuria  NEUROLOGICAL: No numbness or weakness  SKIN: No itching, burning, rashes, or lesions   All other review of systems is negative unless indicated above.  PHYSICAL EXAM:  GENERAL: NAD, well-groomed, well-developed  HEAD:  Atraumatic, Normocephalic  EYES: EOMI, PERRLA, conjunctiva and sclera clear  ENMT: No tonsillar erythema, exudates, or enlargement; Moist mucous membranes, Good dentition, No lesions  NECK: Supple, No JVD, Normal thyroid  NERVOUS SYSTEM:  Alert & Oriented X3, Good concentration; Motor Strength 5/5 B/L upper and lower extremities; DTRs 2+ intact and symmetric  CHEST/LUNG: Clear to auscultation bilaterally; No rales, rhonchi, wheezing, or rubs  HEART: Regular rate and rhythm; No murmurs, rubs, or gallops  ABDOMEN: Soft, Nontender, Nondistended; Bowel sounds present  EXTREMITIES:  2+ Peripheral Pulses, No clubbing or cyanosis  SKIN: No rashes or lesions  INCISION: dry and intact    Time spent-60 minutes.  PMD notified- [phone] left message answering service.

## 2021-06-11 NOTE — PROGRESS NOTE ADULT - ASSESSMENT
75F CAD, HTN, HLD, DM 2, Anxiety, and s/p Right THR admitted for pyelonephritis, gi bleed    #Pyelonephritis  GMF  continue rocephin  ID consulted  f/u cultures    #GI bleed likely from hemmorhoid  GI is following.  Plan for colonoscopy today.  hol eliquis for now  monitor H and H at 6pm. transfuse if Hgb <8    #S/P Right THR  notified ortho PA team. Dr. Shay was her surgeon, will be in tomorrow per PA team    #HTN  continue BP meds with hold parameters    #HLD  continue statins    #Anxiety   continue SSRIs    #DM2  hold oral dm meds  LDISS    #DVT proph  SCDs 2/2 Bleed    Plan of care was discuss with patient, allquestions were answered, seems understand and in agreement.        Plan of care was discuss with patient, all questions were answered, seems understand and in agreement.

## 2021-06-11 NOTE — DISCHARGE NOTE PROVIDER - NSDCFUSCHEDAPPT_GEN_ALL_CORE_FT
SAURAV QUINONES ; 06/17/2021 ; NPP GenSurg [address] [facility]  SAURAV QUINONES ; 07/16/2021 ; NPP OrthoSurg [address] [facility]

## 2021-06-11 NOTE — PRE-OP CHECKLIST - SELECT TESTS ORDERED
BMP/CBC/Type and Screen/Urinalysis/EKG/CXR/COVID-19 BMP/CBC/Type and Screen/Urinalysis/EKG/CXR/POCT Blood Glucose/COVID-19

## 2021-06-11 NOTE — DISCHARGE NOTE PROVIDER - NSDCMRMEDTOKEN_GEN_ALL_CORE_FT
acetaminophen 500 mg oral tablet: 2 tab(s) orally every 8 hours  Align 4 mg oral capsule: 1 cap(s) orally once a day  Allegra D OTC 24HR 180 mg-240 mg oral tablet, extended release: 1 tab(s) orally once a day  apixaban 2.5 mg oral tablet: 1 tab(s) orally every 12 hours  aspirin 81 mg oral delayed release tablet: 1 tab(s) orally once a day. Take at least 2 hours before celebrex  Astelin 137 mcg/inh nasal spray: 2 spray(s) nasal 2 times a day  atorvastatin 40 mg oral tablet: 1 tab(s) orally once a day (at bedtime)  celecoxib 100 mg oral capsule: 1 cap orally every 12 hours. Take 2 hours after Aspirin.    diazePAM 5 mg oral tablet: 1 tab(s) orally once a day (at bedtime)  diphenoxylate-atropine 2.5 mg-0.025 mg oral tablet: 2 tab(s) orally 4 times a day, As Needed - for diarrhea  fenofibrate 160 mg oral tablet: 1 tab(s) orally once a day  Flonase 50 mcg/inh nasal spray: 1 spray(s) nasal 2 times a day  FLUoxetine 20 mg oral capsule: 1 cap(s) orally once a day  furosemide 40 mg oral tablet: 1 tab(s) orally once a day  glimepiride 4 mg oral tablet: 2 tab(s) orally once a day  Hair, Skin &amp; Nails 5 mg oral capsule: 1 cap(s) orally once a day  meclizine 12.5 mg oral tablet: orally 1 to 4 times a day, As Needed  metFORMIN 500 mg oral tablet: 1 tab(s) orally 2 times a day  Multiple Vitamins oral tablet: 1 tab(s) orally once a day  nystatin 100,000 units/g topical cream: Apply topically to affected area 3 times a day  omeprazole 20 mg oral delayed release capsule: 1 cap orally once a day   oxybutynin 10 mg/24 hr oral tablet, extended release: 1 tab(s) orally once a day  oxyCODONE 5 mg oral tablet: 1-2  tab(s) orally every 4 hours, As Needed -Moderate to Severe Pain    Reference #: [id number] MDD:6  polyethylene glycol 3350 oral powder for reconstitution: 17 gram(s) orally once a day (at bedtime), As Needed - for constipation  senna oral tablet: 2 tab(s) orally once a day (at bedtime), As Needed - for constipation  Trulicity Pen 1.5 mg/0.5 mL subcutaneous solution: 1 dose(s) subcutaneous once a week  Monday  Vitamin C 500 mg oral tablet: 2 tab(s) orally once a day  Vitamin D3 2000 intl units (50 mcg) oral tablet: 1 tab(s) orally once a day   acetaminophen 500 mg oral tablet: 2 tab(s) orally every 8 hours  Align 4 mg oral capsule: 1 cap(s) orally once a day  Allegra D OTC 24HR 180 mg-240 mg oral tablet, extended release: 1 tab(s) orally once a day  apixaban 2.5 mg oral tablet: 1 tab(s) orally every 12 hours  aspirin 81 mg oral delayed release tablet: 1 tab(s) orally once a day. Take at least 2 hours before celebrex  Astelin 137 mcg/inh nasal spray: 2 spray(s) nasal 2 times a day  atorvastatin 40 mg oral tablet: 1 tab(s) orally once a day (at bedtime)  celecoxib 100 mg oral capsule: 1 cap orally every 12 hours. Take 2 hours after Aspirin.    diazePAM 5 mg oral tablet: 1 tab(s) orally once a day (at bedtime)  diphenoxylate-atropine 2.5 mg-0.025 mg oral tablet: 2 tab(s) orally 4 times a day, As Needed - for diarrhea  fenofibrate 160 mg oral tablet: 1 tab(s) orally once a day  Flonase 50 mcg/inh nasal spray: 1 spray(s) nasal 2 times a day  FLUoxetine 20 mg oral capsule: 1 cap(s) orally once a day  furosemide 40 mg oral tablet: 1 tab(s) orally once a day  furosemide 40 mg oral tablet: 1 tab(s) orally once a day  glimepiride 4 mg oral tablet: 2 tab(s) orally once a day  Hair, Skin &amp; Nails 5 mg oral capsule: 1 cap(s) orally once a day  hydrocortisone 2.5% rectal cream with applicator: 1 application rectal once a day  meclizine 12.5 mg oral tablet: orally 1 to 4 times a day, As Needed  metFORMIN 500 mg oral tablet: 1 tab(s) orally 2 times a day  metoprolol succinate 50 mg oral tablet, extended release: 3 tab(s) orally once a day  Multiple Vitamins oral tablet: 1 tab(s) orally once a day  nystatin 100,000 units/g topical cream: Apply topically to affected area 3 times a day  omeprazole 20 mg oral delayed release capsule: 1 cap orally once a day   oxybutynin 10 mg/24 hr oral tablet, extended release: 1 tab(s) orally once a day  oxyCODONE 5 mg oral tablet: 1-2  tab(s) orally every 4 hours, As Needed -Moderate to Severe Pain    Reference #: [id number] MDD:6  pantoprazole 40 mg oral delayed release tablet: 1 tab(s) orally once a day (before a meal)  polyethylene glycol 3350 oral powder for reconstitution: 17 gram(s) orally once a day (at bedtime), As Needed - for constipation  psyllium 3.4 g/7 g oral powder for reconstitution: 17 gram(s) orally once a day   senna oral tablet: 2 tab(s) orally once a day (at bedtime), As Needed - for constipation  Trulicity Pen 1.5 mg/0.5 mL subcutaneous solution: 1 dose(s) subcutaneous once a week  Monday  Vitamin C 500 mg oral tablet: 2 tab(s) orally once a day  Vitamin D3 2000 intl units (50 mcg) oral tablet: 1 tab(s) orally once a day   acetaminophen 500 mg oral tablet: 2 tab(s) orally every 8 hours  Allegra D OTC 24HR 180 mg-240 mg oral tablet, extended release: 1 tab(s) orally once a day  Astelin 137 mcg/inh nasal spray: 2 spray(s) nasal 2 times a day  atorvastatin 40 mg oral tablet: 1 tab(s) orally once a day (at bedtime)  diazePAM 5 mg oral tablet: 1 tab(s) orally once a day (at bedtime)  fenofibrate 160 mg oral tablet: 1 tab(s) orally once a day  Flonase 50 mcg/inh nasal spray: 1 spray(s) nasal 2 times a day  FLUoxetine 20 mg oral capsule: 1 cap(s) orally once a day  furosemide 40 mg oral tablet: 1 tab(s) orally once a day  glimepiride 4 mg oral tablet: 2 tab(s) orally once a day  Hair, Skin &amp; Nails 5 mg oral capsule: 1 cap(s) orally once a day  hydrocortisone 2.5% rectal cream with applicator: 1 application rectal once a day  meclizine 12.5 mg oral tablet: orally 1 to 4 times a day, As Needed  metFORMIN 500 mg oral tablet: 1 tab(s) orally 2 times a day  metoprolol succinate 50 mg oral tablet, extended release: 3 tab(s) orally once a day  Multiple Vitamins oral tablet: 1 tab(s) orally once a day  oxybutynin 10 mg/24 hr oral tablet, extended release: 1 tab(s) orally once a day  oxyCODONE 5 mg oral tablet: 1-2  tab(s) orally every 4 hours, As Needed -Moderate to Severe Pain   [facility] Reference #: [id number] MDD:6  pantoprazole 40 mg oral delayed release tablet: 1 tab(s) orally once a day (before a meal)  psyllium 3.4 g/7 g oral powder for reconstitution: 17 gram(s) orally once a day   senna oral tablet: 2 tab(s) orally once a day (at bedtime), As Needed - for constipation  Trulicity Pen 1.5 mg/0.5 mL subcutaneous solution: 1 dose(s) subcutaneous once a week  Monday  Vitamin C 500 mg oral tablet: 2 tab(s) orally once a day  Vitamin D3 2000 intl units (50 mcg) oral tablet: 1 tab(s) orally once a day   acetaminophen 500 mg oral tablet: 2 tab(s) orally every 8 hours  Allegra D OTC 24HR 180 mg-240 mg oral tablet, extended release: 1 tab(s) orally once a day  Astelin 137 mcg/inh nasal spray: 2 spray(s) nasal 2 times a day  atorvastatin 40 mg oral tablet: 1 tab(s) orally once a day (at bedtime)  diazePAM 5 mg oral tablet: 1 tab(s) orally once a day (at bedtime)  fenofibrate 160 mg oral tablet: 1 tab(s) orally once a day  Flonase 50 mcg/inh nasal spray: 1 spray(s) nasal 2 times a day  FLUoxetine 20 mg oral capsule: 1 cap(s) orally once a day  furosemide 40 mg oral tablet: 1 tab(s) orally once a day  glimepiride 4 mg oral tablet: 2 tab(s) orally once a day  Hair, Skin &amp; Nails 5 mg oral capsule: 1 cap(s) orally once a day  hydrocortisone 2.5% rectal cream with applicator: 1 application rectal once a day  meclizine 12.5 mg oral tablet: orally 1 to 4 times a day, As Needed  Metamucil Orange Smooth Texture Sugar Free 3.4 g/5.8 g oral powder for reconstitution: 3.4 gram(s) orally once a day   metFORMIN 500 mg oral tablet: 1 tab(s) orally 2 times a day  metoprolol succinate 50 mg oral tablet, extended release: 3 tab(s) orally once a day  Multiple Vitamins oral tablet: 1 tab(s) orally once a day  oxybutynin 10 mg/24 hr oral tablet, extended release: 1 tab(s) orally once a day  oxyCODONE 5 mg oral tablet: 1-2  tab(s) orally every 4 hours, As Needed -Moderate to Severe Pain    Reference #: [id number] MDD:6  pantoprazole 40 mg oral delayed release tablet: 1 tab(s) orally once a day (before a meal)  senna oral tablet: 2 tab(s) orally once a day (at bedtime), As Needed - for constipation  Trulicity Pen 1.5 mg/0.5 mL subcutaneous solution: 1 dose(s) subcutaneous once a week  Monday  Vitamin C 500 mg oral tablet: 2 tab(s) orally once a day  Vitamin D3 2000 intl units (50 mcg) oral tablet: 1 tab(s) orally once a day   acetaminophen 500 mg oral tablet: 2 tab(s) orally every 8 hours  Allegra D OTC 24HR 180 mg-240 mg oral tablet, extended release: 1 tab(s) orally once a day  Anusol-HC 25 mg rectal suppository: 1 suppository(ies) rectally once a day (at bedtime)   Astelin 137 mcg/inh nasal spray: 2 spray(s) nasal 2 times a day  atorvastatin 40 mg oral tablet: 1 tab(s) orally once a day (at bedtime)  diazePAM 5 mg oral tablet: 1 tab(s) orally once a day (at bedtime)  fenofibrate 160 mg oral tablet: 1 tab(s) orally once a day  Flonase 50 mcg/inh nasal spray: 1 spray(s) nasal 2 times a day  FLUoxetine 20 mg oral capsule: 1 cap(s) orally once a day  furosemide 40 mg oral tablet: 1 tab(s) orally once a day  glimepiride 4 mg oral tablet: 2 tab(s) orally once a day  Hair, Skin &amp; Nails 5 mg oral capsule: 1 cap(s) orally once a day  hydrocortisone 2.5% rectal cream with applicator: 1 application rectal once a day  meclizine 12.5 mg oral tablet: orally 1 to 4 times a day, As Needed  Metamucil Orange Smooth Texture Sugar Free 3.4 g/5.8 g oral powder for reconstitution: 3.4 gram(s) orally once a day   metFORMIN 500 mg oral tablet: 1 tab(s) orally 2 times a day  metoprolol succinate 50 mg oral tablet, extended release: 3 tab(s) orally once a day  Multiple Vitamins oral tablet: 1 tab(s) orally once a day  oxybutynin 10 mg/24 hr oral tablet, extended release: 1 tab(s) orally once a day  oxyCODONE 5 mg oral tablet: 1-2  tab(s) orally every 4 hours, As Needed -Moderate to Severe Pain    Reference #: [id number] MDD:6  pantoprazole 40 mg oral delayed release tablet: 1 tab(s) orally once a day (before a meal)  senna oral tablet: 2 tab(s) orally once a day (at bedtime), As Needed - for constipation  Trulicity Pen 1.5 mg/0.5 mL subcutaneous solution: 1 dose(s) subcutaneous once a week  Monday  Vitamin C 500 mg oral tablet: 2 tab(s) orally once a day  Vitamin D3 2000 intl units (50 mcg) oral tablet: 1 tab(s) orally once a day

## 2021-06-11 NOTE — PROGRESS NOTE ADULT - SUBJECTIVE AND OBJECTIVE BOX
SAURAV QUINONES is a 76yFemale , patient examined and chart reviewed.    INTERVAL HPI/ OVERNIGHT EVENTS:   events noted. Had rectal bleeding.   Febrile still Tmax 101.3 this am.    PAST MEDICAL & SURGICAL HISTORY:  Essential hypertension  Anxiety  Environmental allergies  Gastroesophageal reflux disease without esophagitis  H/O: pneumonia  treated in 2016 as an outpatient for pneumonia  OAB (overactive bladder)  Osteopenia  Irritable bowel  COVID-19 vaccine series completed  pfizer, completed 21  Type 2 diabetes mellitus  Osteoarthritis  Hyperlipidemia  Coronary artery disease  Urinary frequency  Mixed stress and urge urinary incontinence  Insomnia  difficulty falling asleep  Diarrhea  increased in frequency recently  Perineal rash  History of vertigo  last episode 2020  Leg swelling  Atypical hyperplasia of right breast  Hearing loss  left  Obesity, Class I, BMI 30-34.9  H/O cataract extraction    History of right knee joint replacement    H/O lumpectomy  right breast - benign  and  atypical cells  Status post double vessel coronary artery bypass    H/O mitral valve repair    History of open reduction and internal fixation (ORIF) procedure  right elbow       For details regarding the patient's social history, family history, and other miscellaneous elements, please refer the initial infectious diseases consultation and/or the admitting history and physical examination for this admission.    ROS:  CONSTITUTIONAL:  + fever or chills  EYES:  Negative  blurry vision or double vision  CARDIOVASCULAR:  Negative for chest pain or palpitations  RESPIRATORY:  Negative for cough, wheezing, or SOB   GASTROINTESTINAL:  Negative for nausea, vomiting, diarrhea, constipation, or abdominal pain + rectal bleed  GENITOURINARY:  Negative frequency, urgency or dysuria  NEUROLOGIC:  No headache, confusion, dizziness, lightheadedness  All other systems were reviewed and are negative     ALLERGIES  amoxicillin (Pruritus)  erythromycin (Pruritus)      Current inpatient medications :    ANTIBIOTICS/RELEVANT:  cefTRIAXone   IVPB 1000 milliGRAM(s) IV Intermittent every 24 hours  iohexol 300 mG (iodine)/mL Oral Solution 30 milliLiter(s) Oral once      atorvastatin 40 milliGRAM(s) Oral at bedtime  dextrose 40% Gel 15 Gram(s) Oral once  dextrose 5%. 1000 milliLiter(s) IV Continuous <Continuous>  dextrose 5%. 1000 milliLiter(s) IV Continuous <Continuous>  dextrose 50% Injectable 25 Gram(s) IV Push once  dextrose 50% Injectable 12.5 Gram(s) IV Push once  dextrose 50% Injectable 25 Gram(s) IV Push once  diazepam    Tablet 5 milliGRAM(s) Oral at bedtime  FLUoxetine 20 milliGRAM(s) Oral daily  fluticasone propionate 50 MICROgram(s)/spray Nasal Spray 1 Spray(s) Both Nostrils two times a day  furosemide    Tablet 40 milliGRAM(s) Oral daily  glucagon  Injectable 1 milliGRAM(s) IntraMuscular once  insulin lispro (ADMELOG) corrective regimen sliding scale   SubCutaneous three times a day before meals  loratadine 10 milliGRAM(s) Oral daily  oxybutynin 10 milliGRAM(s) Oral daily  oxyCODONE    IR 5 milliGRAM(s) Oral every 4 hours PRN  pantoprazole    Tablet 40 milliGRAM(s) Oral before breakfast  senna 2 Tablet(s) Oral at bedtime PRN      Objective:     @ 07:01  -   @ 16:16  --------------------------------------------------------  IN: 460 mL / OUT: 0 mL / NET: 460 mL      T(C): 36.6 (21 @ 14:15), Max: 38.5 (21 @ 05:56)  HR: 96 (21 @ 14:15) (81 - 100)  BP: 146/73 (21 @ 14:15) (107/74 - 156/79)  RR: 18 (21 @ 14:15) (18 - 18)  SpO2: 95% (21 @ 14:15) (93% - 96%)    Physical Exam:  General: no acute distress  Neck: supple, trachea midline  Lungs: clear, no wheeze/rhonchi  Cardiovascular: regular rate and rhythm, S1 S2  Abdomen: soft, nontender,  bowel sounds normal  Neurological: alert and oriented x3  Skin: no rash  Extremities: no cyanosis/clubbing/edema      LABS:                          8.3    6.54  )-----------( 206      ( 2021 06:53 )             25.7           137  |  101  |  12  ----------------------------<  147<H>  3.5   |  29  |  0.55    Ca    9.0      2021 06:53    TPro  6.9  /  Alb  3.0<L>  /  TBili  0.8  /  DBili  x   /  AST  22  /  ALT  27  /  AlkPhos  51        Urinalysis Basic - ( 2021 10:30 )    Color: Yellow / Appearance: Slightly Turbid / S.020 / pH: x  Gluc: x / Ketone: Trace  / Bili: Small / Urobili: 4 mg/dL   Blood: x / Protein: 100 mg/dL / Nitrite: Positive   Leuk Esterase: Moderate / RBC: 3-5 /HPF / WBC 6-10   Sq Epi: x / Non Sq Epi: Moderate / Bacteria: Moderate      MICROBIOLOGY:        RADIOLOGY & ADDITIONAL STUDIES:    EXAM:  CT ABDOMEN AND PELVIS OC                                  PROCEDURE DATE:  2021          INTERPRETATION:  CLINICAL INFORMATION: Fever evaluate for colitis    COMPARISON: None.    CONTRAST/COMPLICATIONS:  IV Contrast: NONE  Oral Contrast: Omnipaque 300  Complications: None reported at time of study completion    PROCEDURE:  CT of the Abdomen and Pelvis was performed.  Sagittal and coronal reformats were performed.    FINDINGS:  LOWER CHEST: Status post mitral valve surgery. Decreasecardiac chamber blood pool attenuation suggests an anemic state. Bibasilar atelectatic changes. Trace right pleural effusion. Asymmetric soft tissue density right breast. Correlate clinically and with dedicated breast imaging..    LIVER: Within normal limits.  BILE DUCTS: Normal caliber.  GALLBLADDER: Cholelithiasis.  SPLEEN: Mild splenomegaly (measuring up to 13.3 cm long dimension).  PANCREAS: Within normal limits.  ADRENALS: Within normal limits.  KIDNEYS/URETERS: Indeterminate 1.4 cm exophytic cortical lesion lower pole right kidney. Follow-up with a contrast-enhanced CT or MR..    BLADDER: Within normal limits.  REPRODUCTIVE ORGANS: Hysterectomy.    BOWEL: No bowel obstruction. Colonic diverticula without acute diverticulitis. Appendix no appendicitis  PERITONEUM: No ascites.  VESSELS: Nonaneurysmal.  RETROPERITONEUM/LYMPH NODES: No lymphadenopathy.  ABDOMINAL WALL: Within normal limits.  BONES: Right THR. Poorly defined fluid with a few scattered gas bubbles anterior to the right hip. This could represent evolving postoperative change. Cannot exclude infection in the appropriate setting. Consider contrast enhanced CT of the right hip as clinically indicated.    IMPRESSION:  Cholelithiasis.  Diverticulosis coli without evidence of diverticulitis or other active bowel inflammation.  Mild nonspecific splenomegaly.  Indeterminate cortical lesion right kidney. Follow-up with a contrast enhanced CT or MR (barring any contraindications).  Asymmetric soft tissue density right breast. Correlate with dedicated breast imaging.  Status post recent right THR. Poorly defined fluid and gas anterior to the right hip could represent postoperative change. Cannot exclude infection in the appropriate setting. Consider contrast enhanced CT of the right hip      Assessment :  75F CAD, HTN, HLD, DM 2, Anxiety, and s/p Right THR May 24 2021 admitted with fever and nausea sec UTI  GIB- Sec ?diverticulosis  Still febrile  CT AP noted    Plan :   Cont Rocephin   Fu cultures  Trend temps and cbc  GI on case    Continue with present regiment.  Appropriate use of antibiotics and adverse effects reviewed.      > 35 minutes were spent in direct patient care reviewing notes, medications ,labs data/ imaging , discussion with multidisciplinary team.    Thank you for allowing me to participate in care of your patient .    Lexii Lazcano MD  Infectious Disease  [phone]
     SAURAV QUINONES is a 76yFemale , patient examined and chart reviewed.    INTERVAL HPI/ OVERNIGHT EVENTS:   s/p colonoscopy with findings of diverticulosis  and bleeding hemorrhoids.  Afebrile.    PAST MEDICAL & SURGICAL HISTORY:  Essential hypertension  Anxiety  Environmental allergies  Gastroesophageal reflux disease without esophagitis  H/O: pneumonia  treated in 2016 as an outpatient for pneumonia  OAB (overactive bladder)  Osteopenia  Irritable bowel  COVID-19 vaccine series completed  pfizer, completed 21  Type 2 diabetes mellitus  Osteoarthritis  Hyperlipidemia  Coronary artery disease  Urinary frequency  Mixed stress and urge urinary incontinence  Insomnia  difficulty falling asleep  Diarrhea  increased in frequency recently  Perineal rash  History of vertigo  last episode 2020  Leg swelling  Atypical hyperplasia of right breast  Hearing loss  left  Obesity, Class I, BMI 30-34.9  H/O cataract extraction    History of right knee joint replacement    H/O lumpectomy  right breast - benign  and  atypical cells  Status post double vessel coronary artery bypass    H/O mitral valve repair    History of open reduction and internal fixation (ORIF) procedure  right elbow       For details regarding the patient's social history, family history, and other miscellaneous elements, please refer the initial infectious diseases consultation and/or the admitting history and physical examination for this admission.    ROS:  CONSTITUTIONAL: no fever or chills  EYES:  Negative  blurry vision or double vision  CARDIOVASCULAR:  Negative for chest pain or palpitations  RESPIRATORY:  Negative for cough, wheezing, or SOB   GASTROINTESTINAL:  Negative for nausea, vomiting, diarrhea, constipation, or abdominal pain   GENITOURINARY:  Negative frequency, urgency or dysuria  NEUROLOGIC:  No headache, confusion, dizziness, lightheadedness  All other systems were reviewed and are negative     ALLERGIES  amoxicillin (Pruritus)  erythromycin (Pruritus)      Current inpatient medications :    ANTIBIOTICS/RELEVANT:  cefTRIAXone   IVPB 1000 milliGRAM(s) IV Intermittent every 24 hours      MEDICATIONS  (STANDING):  atorvastatin 40 milliGRAM(s) Oral at bedtime  dextrose 40% Gel 15 Gram(s) Oral once  dextrose 5%. 1000 milliLiter(s) (50 mL/Hr) IV Continuous <Continuous>  dextrose 5%. 1000 milliLiter(s) (100 mL/Hr) IV Continuous <Continuous>  dextrose 50% Injectable 25 Gram(s) IV Push once  dextrose 50% Injectable 12.5 Gram(s) IV Push once  dextrose 50% Injectable 25 Gram(s) IV Push once  diazepam    Tablet 5 milliGRAM(s) Oral at bedtime  FLUoxetine 20 milliGRAM(s) Oral daily  fluticasone propionate 50 MICROgram(s)/spray Nasal Spray 1 Spray(s) Both Nostrils two times a day  furosemide    Tablet 40 milliGRAM(s) Oral daily  glucagon  Injectable 1 milliGRAM(s) IntraMuscular once  hydrocortisone 2.5% Rectal Cream 1 Application(s) Rectal daily  hydrocortisone hemorrhoidal Suppository 1 Suppository(s) Rectal daily  insulin lispro (ADMELOG) corrective regimen sliding scale   SubCutaneous three times a day before meals  iohexol 300 mG (iodine)/mL Oral Solution 30 milliLiter(s) Oral once  loratadine 10 milliGRAM(s) Oral daily  metoprolol succinate  milliGRAM(s) Oral daily  oxybutynin 10 milliGRAM(s) Oral daily  pantoprazole    Tablet 40 milliGRAM(s) Oral before breakfast  psyllium Powder 1 Packet(s) Oral daily  sodium chloride 0.45%. 1000 milliLiter(s) (50 mL/Hr) IV Continuous <Continuous>    MEDICATIONS  (PRN):  oxyCODONE    IR 5 milliGRAM(s) Oral every 4 hours PRN Moderate Pain (4 - 6)  senna 2 Tablet(s) Oral at bedtime PRN for constipation        Objective:  Vital Signs Last 24 Hrs  T(C): 37.1 (2021 22:08), Max: 37.2 (2021 16:30)  T(F): 98.7 (2021 22:08), Max: 98.9 (2021 16:30)  HR: 75 (2021 22:08) (75 - 99)  BP: 141/63 (2021 22:08) (134/76 - 163/66)  RR: 18 (2021 22:08) (13 - 19)  SpO2: 97% (2021 22:08) (95% - 100%)    Physical Exam:  General: no acute distress  Neck: supple, trachea midline  Lungs: clear, no wheeze/rhonchi  Cardiovascular: regular rate and rhythm, S1 S2  Abdomen: soft, nontender,  bowel sounds normal  Neurological: alert and oriented x3  Skin: no rash  Extremities: right Hip surgical site c/d/i      LABS:                        8.7    6.04  )-----------( 264      ( 2021 07:17 )             27.2   -    139  |  104  |  7   ----------------------------<  176<H>  3.4<L>   |  25  |  0.48<L>    Ca    9.1      2021 07:17      Urinalysis Basic - ( 2021 10:30 )    Color: Yellow / Appearance: Slightly Turbid / S.020 / pH: x  Gluc: x / Ketone: Trace  / Bili: Small / Urobili: 4 mg/dL   Blood: x / Protein: 100 mg/dL / Nitrite: Positive   Leuk Esterase: Moderate / RBC: 3-5 /HPF / WBC 6-10   Sq Epi: x / Non Sq Epi: Moderate / Bacteria: Moderate      MICROBIOLOGY:    Culture - Urine (collected 2021 22:20)  Source: .Urine Clean Catch (Midstream)  Final Report (2021 21:26):    <10,000 CFU/mL Normal Urogenital Ifeoma    Culture - Blood (collected 2021 16:35)  Source: .Blood Blood-Peripheral  Preliminary Report (2021 17:01):    No growth to date.    Culture - Blood (collected 2021 16:35)  Source: .Blood Blood-Peripheral  Preliminary Report (2021 17:01):    No growth to date.        RADIOLOGY & ADDITIONAL STUDIES:    EXAM:  CT ABDOMEN AND PELVIS OC                                  PROCEDURE DATE:  2021          INTERPRETATION:  CLINICAL INFORMATION: Fever evaluate for colitis    COMPARISON: None.    CONTRAST/COMPLICATIONS:  IV Contrast: NONE  Oral Contrast: Omnipaque 300  Complications: None reported at time of study completion    PROCEDURE:  CT of the Abdomen and Pelvis was performed.  Sagittal and coronal reformats were performed.    FINDINGS:  LOWER CHEST: Status post mitral valve surgery. Decreasecardiac chamber blood pool attenuation suggests an anemic state. Bibasilar atelectatic changes. Trace right pleural effusion. Asymmetric soft tissue density right breast. Correlate clinically and with dedicated breast imaging..    LIVER: Within normal limits.  BILE DUCTS: Normal caliber.  GALLBLADDER: Cholelithiasis.  SPLEEN: Mild splenomegaly (measuring up to 13.3 cm long dimension).  PANCREAS: Within normal limits.  ADRENALS: Within normal limits.  KIDNEYS/URETERS: Indeterminate 1.4 cm exophytic cortical lesion lower pole right kidney. Follow-up with a contrast-enhanced CT or MR..    BLADDER: Within normal limits.  REPRODUCTIVE ORGANS: Hysterectomy.    BOWEL: No bowel obstruction. Colonic diverticula without acute diverticulitis. Appendix no appendicitis  PERITONEUM: No ascites.  VESSELS: Nonaneurysmal.  RETROPERITONEUM/LYMPH NODES: No lymphadenopathy.  ABDOMINAL WALL: Within normal limits.  BONES: Right THR. Poorly defined fluid with a few scattered gas bubbles anterior to the right hip. This could represent evolving postoperative change. Cannot exclude infection in the appropriate setting. Consider contrast enhanced CT of the right hip as clinically indicated.    IMPRESSION:  Cholelithiasis.  Diverticulosis coli without evidence of diverticulitis or other active bowel inflammation.  Mild nonspecific splenomegaly.  Indeterminate cortical lesion right kidney. Follow-up with a contrast enhanced CT or MR (barring any contraindications).  Asymmetric soft tissue density right breast. Correlate with dedicated breast imaging.  Status post recent right THR. Poorly defined fluid and gas anterior to the right hip could represent postoperative change. Cannot exclude infection in the appropriate setting. Consider contrast enhanced CT of the right hip      Assessment :  75F CAD, HTN, HLD, DM 2, Anxiety, and s/p Right THR May 24 2021 admitted with fever and nausea   Possible UTI though Ucx NGTD  Sp fever  GIB- s/p colonoscopy with findings of diverticulosis  and bleeding hemorrhoids    Plan :   Dc Rocephin as all cultures neg  Monitor off antibiotics  Trend temps and cbc  Stable from ID standpoint    Continue with present regiment.  Appropriate use of antibiotics and adverse effects reviewed.      > 35 minutes were spent in direct patient care reviewing notes, medications ,labs data/ imaging , discussion with multidisciplinary team.    Thank you for allowing me to participate in care of your patient .    Lexii Lazcano MD  Infectious Disease  [phone]
  Chief Complaint:  Patient is a 76y old  Female who presents with a chief complaint of Pyelonephritis, GI bleed (2021 12:24)      Interval Events:   had another episode of hematochezia this AM (after blood draw this AM)    Hospital Medications:  atorvastatin 40 milliGRAM(s) Oral at bedtime  cefTRIAXone   IVPB 1000 milliGRAM(s) IV Intermittent every 24 hours  dextrose 40% Gel 15 Gram(s) Oral once  dextrose 5%. 1000 milliLiter(s) IV Continuous <Continuous>  dextrose 5%. 1000 milliLiter(s) IV Continuous <Continuous>  dextrose 50% Injectable 25 Gram(s) IV Push once  dextrose 50% Injectable 12.5 Gram(s) IV Push once  dextrose 50% Injectable 25 Gram(s) IV Push once  diazepam    Tablet 5 milliGRAM(s) Oral at bedtime  FLUoxetine 20 milliGRAM(s) Oral daily  fluticasone propionate 50 MICROgram(s)/spray Nasal Spray 1 Spray(s) Both Nostrils two times a day  furosemide    Tablet 40 milliGRAM(s) Oral daily  glucagon  Injectable 1 milliGRAM(s) IntraMuscular once  insulin lispro (ADMELOG) corrective regimen sliding scale   SubCutaneous three times a day before meals  iohexol 300 mG (iodine)/mL Oral Solution 30 milliLiter(s) Oral once  loratadine 10 milliGRAM(s) Oral daily  oxybutynin 10 milliGRAM(s) Oral daily  oxyCODONE    IR 5 milliGRAM(s) Oral every 4 hours PRN  pantoprazole    Tablet 40 milliGRAM(s) Oral before breakfast  senna 2 Tablet(s) Oral at bedtime PRN        PHYSICAL EXAM:   Vital Signs:  Vital Signs Last 24 Hrs  T(C): 36.8 (10 Johnathan 2021 05:22), Max: 36.8 (2021 10:02)  T(F): 98.3 (10 Johnathan 2021 05:22), Max: 98.3 (2021 10:02)  HR: 84 (2021 21:44) (81 - 96)  BP: 149/82 (10 Johnathan 2021 06:33) (136/75 - 165/81)  BP(mean): --  RR: 18 (10 Johnathan 2021 05:22) (18 - 18)  SpO2: 95% (10 Johnathan 2021 05:22) (92% - 95%)  Daily     Daily     GENERAL:  Appears stated age,  no distress  HEENT:   sclera -anicteric  CHEST:   no increased effort, breath sounds clear  HEART:  Regular rhythm, S1, S2,   ABDOMEN:  Soft, non-tender, non-distended, normoactive bowel sounds,    EXTREMITIES:  no cyanosis, clubbing or edema  SKIN:  No rash/no jaundice   NEURO:  Alert, oriented    LABS:                        8.3    5.21  )-----------( 226      ( 10 Johnathan 2021 08:00 )             25.7     Mean Cell Volume: 85.4 fl (06-10- @ 08:00)    06-10    140  |  103  |  7   ----------------------------<  150<H>  3.3<L>   |  27  |  0.44<L>    Ca    8.7      10 Johnathan 2021 08:00    TPro  6.9  /  Alb  3.0<L>  /  TBili  0.8  /  DBili  x   /  AST  22  /  ALT  27  /  AlkPhos  51  06-09    LIVER FUNCTIONS - ( 2021 06:53 )  Alb: 3.0 g/dL / Pro: 6.9 g/dL / ALK PHOS: 51 U/L / ALT: 27 U/L DA / AST: 22 U/L / GGT: x             Urinalysis Basic - ( 2021 10:30 )    Color: Yellow / Appearance: Slightly Turbid / S.020 / pH: x  Gluc: x / Ketone: Trace  / Bili: Small / Urobili: 4 mg/dL   Blood: x / Protein: 100 mg/dL / Nitrite: Positive   Leuk Esterase: Moderate / RBC: 3-5 /HPF / WBC 6-10   Sq Epi: x / Non Sq Epi: Moderate / Bacteria: Moderate                              8.3    5.21  )-----------( 226      ( 10 Johnathan 2021 08:00 )             25.7                         7.9    5.79  )-----------( 218      ( 2021 20:18 )             24.6                         8.3    6.54  )-----------( 206      ( 2021 06:53 )             25.7                         7.0    5.29  )-----------( 198      ( 2021 17:18 )             22.4                         7.7    6.53  )-----------( 206      ( 2021 10:30 )             24.0     Imaging:          
Patient is a 76y old  Female who presents with a chief complaint of Pyelonephritis, GI bleed (11 Jun 2021 17:15)      INTERVAL HPI/OVERNIGHT EVENTS:  Pt is seen and examined.  feels better No more bleeding event noted.    Pain Location & Control:     MEDICATIONS  (STANDING):  atorvastatin 40 milliGRAM(s) Oral at bedtime  cefTRIAXone   IVPB 1000 milliGRAM(s) IV Intermittent every 24 hours  dextrose 40% Gel 15 Gram(s) Oral once  dextrose 5%. 1000 milliLiter(s) (50 mL/Hr) IV Continuous <Continuous>  dextrose 5%. 1000 milliLiter(s) (100 mL/Hr) IV Continuous <Continuous>  dextrose 50% Injectable 25 Gram(s) IV Push once  dextrose 50% Injectable 12.5 Gram(s) IV Push once  dextrose 50% Injectable 25 Gram(s) IV Push once  diazepam    Tablet 5 milliGRAM(s) Oral at bedtime  FLUoxetine 20 milliGRAM(s) Oral daily  fluticasone propionate 50 MICROgram(s)/spray Nasal Spray 1 Spray(s) Both Nostrils two times a day  furosemide    Tablet 40 milliGRAM(s) Oral daily  glucagon  Injectable 1 milliGRAM(s) IntraMuscular once  hydrocortisone 2.5% Rectal Cream 1 Application(s) Rectal daily  hydrocortisone hemorrhoidal Suppository 1 Suppository(s) Rectal daily  insulin lispro (ADMELOG) corrective regimen sliding scale   SubCutaneous three times a day before meals  iohexol 300 mG (iodine)/mL Oral Solution 30 milliLiter(s) Oral once  loratadine 10 milliGRAM(s) Oral daily  metoprolol succinate  milliGRAM(s) Oral daily  oxybutynin 10 milliGRAM(s) Oral daily  pantoprazole    Tablet 40 milliGRAM(s) Oral before breakfast  psyllium Powder 1 Packet(s) Oral daily  sodium chloride 0.45%. 1000 milliLiter(s) (50 mL/Hr) IV Continuous <Continuous>    MEDICATIONS  (PRN):  oxyCODONE    IR 5 milliGRAM(s) Oral every 4 hours PRN Moderate Pain (4 - 6)  senna 2 Tablet(s) Oral at bedtime PRN for constipation      Allergies    amoxicillin (Pruritus)  erythromycin (Pruritus)    Intolerances            Vital Signs Last 24 Hrs  T(C): 37.1 (11 Jun 2021 22:08), Max: 37.2 (11 Jun 2021 16:30)  T(F): 98.7 (11 Jun 2021 22:08), Max: 98.9 (11 Jun 2021 16:30)  HR: 75 (11 Jun 2021 22:08) (75 - 78)  BP: 141/63 (11 Jun 2021 22:08) (138/66 - 157/70)  BP(mean): --  RR: 18 (11 Jun 2021 22:08) (15 - 19)  SpO2: 97% (11 Jun 2021 22:08) (95% - 100%)        LABS:                        8.8    6.99  )-----------( 243      ( 12 Jun 2021 06:36 )             27.8     12 Jun 2021 06:36    137    |  101    |  11     ----------------------------<  169    3.8     |  30     |  0.52     Ca    8.8        12 Jun 2021 06:36          CAPILLARY BLOOD GLUCOSE      POCT Blood Glucose.: 179 mg/dL (12 Jun 2021 07:33)  POCT Blood Glucose.: 143 mg/dL (11 Jun 2021 21:33)  POCT Blood Glucose.: 120 mg/dL (11 Jun 2021 17:45)  POCT Blood Glucose.: 187 mg/dL (11 Jun 2021 12:13)        Cultures  Culture Results:   <10,000 CFU/mL Normal Urogenital Ifeoma (06-08 @ 22:20)  Culture Results:   No growth to date. (06-08 @ 16:35)  Culture Results:   No growth to date. (06-08 @ 16:35)        Culture - Urine (collected 06-08-21 @ 22:20)  Source: .Urine Clean Catch (Midstream)  Final Report (06-09-21 @ 21:26):    <10,000 CFU/mL Normal Urogenital Ifeoma    Culture - Blood (collected 06-08-21 @ 16:35)  Source: .Blood Blood-Peripheral  Preliminary Report (06-09-21 @ 17:01):    No growth to date.    Culture - Blood (collected 06-08-21 @ 16:35)  Source: .Blood Blood-Peripheral  Preliminary Report (06-09-21 @ 17:01):    No growth to date.        RADIOLOGY & ADDITIONAL TESTS:    Imaging Personally Reviewed:  [ ] YES  [ ] NO    Consultant(s) Notes Reviewed:  [ ] YES  [ ] NO    Care Discussed with Consultants/Other Providers [x ] YES  [ ] NO
  Chief Complaint:  Patient is a 76y old  Female who presents with a chief complaint of Pyelonephritis, GI bleed (2021 16:09)      Interval Events:     no abdominal pains  still with some mild BRB dripping  s/p 1 unit pRBC with appropriate Hgb increase  last colonoscopy 8 years ago  history of hemorrhoidal ligation  complaining of hunger    Hospital Medications:  atorvastatin 40 milliGRAM(s) Oral at bedtime  cefTRIAXone   IVPB 1000 milliGRAM(s) IV Intermittent every 24 hours  dextrose 40% Gel 15 Gram(s) Oral once  dextrose 5%. 1000 milliLiter(s) IV Continuous <Continuous>  dextrose 5%. 1000 milliLiter(s) IV Continuous <Continuous>  dextrose 50% Injectable 25 Gram(s) IV Push once  dextrose 50% Injectable 12.5 Gram(s) IV Push once  dextrose 50% Injectable 25 Gram(s) IV Push once  diazepam    Tablet 5 milliGRAM(s) Oral at bedtime  FLUoxetine 20 milliGRAM(s) Oral daily  furosemide    Tablet 40 milliGRAM(s) Oral daily  glucagon  Injectable 1 milliGRAM(s) IntraMuscular once  insulin lispro (ADMELOG) corrective regimen sliding scale   SubCutaneous three times a day before meals  iohexol 300 mG (iodine)/mL Oral Solution 30 milliLiter(s) Oral once  oxybutynin 10 milliGRAM(s) Oral daily  oxyCODONE    IR 5 milliGRAM(s) Oral every 4 hours PRN  pantoprazole    Tablet 40 milliGRAM(s) Oral before breakfast  senna 2 Tablet(s) Oral at bedtime PRN        PHYSICAL EXAM:   Vital Signs:  Vital Signs Last 24 Hrs  T(C): 38.5 (2021 05:56), Max: 38.5 (2021 05:56)  T(F): 101.3 (2021 05:56), Max: 101.3 (2021 05:56)  HR: 94 (2021 05:56) (81 - 100)  BP: 156/79 (2021 05:56) (107/74 - 156/79)  BP(mean): --  RR: 18 (2021 05:56) (18 - 18)  SpO2: 94% (2021 05:56) (93% - 96%)  Daily     Daily     GENERAL:  Appears stated age,  no distress  HEENT:   sclera -anicteric  CHEST:   no increased effort, breath sounds clear  HEART:  Regular rhythm, S1, S2,   ABDOMEN:  Soft, non-tender, non-distended, normoactive bowel sounds,    EXTREMITIES:  no cyanosis, clubbing or edema  SKIN:  No rash/no jaundice   NEURO:  Alert, oriented    LABS:                        8.3    6.54  )-----------( 206      ( 2021 06:53 )             25.7     Mean Cell Volume: 85.4 fl (21 @ 06:53)        137  |  101  |  12  ----------------------------<  147<H>  3.5   |  29  |  0.55    Ca    9.0      2021 06:53    TPro  6.9  /  Alb  3.0<L>  /  TBili  0.8  /  DBili  x   /  AST  22  /  ALT  27  /  AlkPhos  51  06-09    LIVER FUNCTIONS - ( 2021 06:53 )  Alb: 3.0 g/dL / Pro: 6.9 g/dL / ALK PHOS: 51 U/L / ALT: 27 U/L DA / AST: 22 U/L / GGT: x             Urinalysis Basic - ( 2021 10:30 )    Color: Yellow / Appearance: Slightly Turbid / S.020 / pH: x  Gluc: x / Ketone: Trace  / Bili: Small / Urobili: 4 mg/dL   Blood: x / Protein: 100 mg/dL / Nitrite: Positive   Leuk Esterase: Moderate / RBC: 3-5 /HPF / WBC 6-10   Sq Epi: x / Non Sq Epi: Moderate / Bacteria: Moderate      Amylase Serum--      Lipase [id number]       Ammonia--                          8.3    6.54  )-----------(       ( 2021 06:53 )             25.7                         7.0    5.29  )-----------( 198      ( 2021 17:18 )             22.4                         7.7    6.53  )-----------(       ( 2021 10:30 )             24.0     Imaging:  < from: CT Abdomen and Pelvis w/ Oral Cont (21 @ 08:49) >  EXAM:  CT ABDOMEN AND PELVIS OC                                  PROCEDURE DATE:  2021          INTERPRETATION:  CLINICAL INFORMATION: Fever evaluate for colitis    COMPARISON: None.    CONTRAST/COMPLICATIONS:  IV Contrast: NONE  Oral Contrast: Omnipaque 300  Complications: None reported at time of study completion    PROCEDURE:  CT of the Abdomen and Pelvis was performed.  Sagittal and coronal reformats were performed.    FINDINGS:  LOWER CHEST: Status post mitral valve surgery. Decreasecardiac chamber blood pool attenuation suggests an anemic state. Bibasilar atelectatic changes. Trace right pleural effusion. Asymmetric soft tissue density right breast. Correlate clinically and with dedicated breast imaging..    LIVER: Within normal limits.  BILE DUCTS: Normal caliber.  GALLBLADDER: Cholelithiasis.  SPLEEN: Mild splenomegaly (measuring up to 13.3 cm long dimension).  PANCREAS: Within normal limits.  ADRENALS: Within normal limits.  KIDNEYS/URETERS: Indeterminate 1.4 cm exophytic cortical lesion lower pole right kidney. Follow-up with a contrast-enhanced CT or MR..    BLADDER: Within normal limits.  REPRODUCTIVE ORGANS: Hysterectomy.    BOWEL: No bowel obstruction. Colonic diverticula without acute diverticulitis. Appendix no appendicitis  PERITONEUM: No ascites.  VESSELS: Nonaneurysmal.  RETROPERITONEUM/LYMPH NODES: No lymphadenopathy.  ABDOMINAL WALL: Within normal limits.  BONES: Right THR. Poorly defined fluid with a few scattered gas bubbles anterior to the right hip. This could represent evolving postoperative change. Cannot exclude infection in the appropriate setting. Consider contrast enhanced CT of the right hip as clinically indicated.    IMPRESSION:  Cholelithiasis.  Diverticulosis coli without evidence of diverticulitis or other active bowel inflammation.  Mild nonspecific splenomegaly.  Indeterminate cortical lesion right kidney. Follow-up with a contrast enhanced CT or MR (barring any contraindications).  Asymmetric soft tissue density right breast. Correlate with dedicated breast imaging.  Status post recent right THR. Poorly defined fluid and gas anterior to the right hip could represent postoperative change. Cannot exclude infection in the appropriate setting. Consider contrast enhanced CT of the right hip              REED FERRARA MD; Attending Radiologist  This document has been electronically signed. 2021  9:51AM    < end of copied text >          
     SAURAV QUINONES is a 76yFemale , patient examined and chart reviewed.    INTERVAL HPI/ OVERNIGHT EVENTS:   Still with rectal bleeding.   No further fevers.    PAST MEDICAL & SURGICAL HISTORY:  Essential hypertension  Anxiety  Environmental allergies  Gastroesophageal reflux disease without esophagitis  H/O: pneumonia  treated in 2016 as an outpatient for pneumonia  OAB (overactive bladder)  Osteopenia  Irritable bowel  COVID-19 vaccine series completed  pfizer, completed 21  Type 2 diabetes mellitus  Osteoarthritis  Hyperlipidemia  Coronary artery disease  Urinary frequency  Mixed stress and urge urinary incontinence  Insomnia  difficulty falling asleep  Diarrhea  increased in frequency recently  Perineal rash  History of vertigo  last episode 2020  Leg swelling  Atypical hyperplasia of right breast  Hearing loss  left  Obesity, Class I, BMI 30-34.9  H/O cataract extraction    History of right knee joint replacement    H/O lumpectomy  right breast - benign  and  atypical cells  Status post double vessel coronary artery bypass    H/O mitral valve repair    History of open reduction and internal fixation (ORIF) procedure  right elbow       For details regarding the patient's social history, family history, and other miscellaneous elements, please refer the initial infectious diseases consultation and/or the admitting history and physical examination for this admission.    ROS:  CONSTITUTIONAL: no fever or chills  EYES:  Negative  blurry vision or double vision  CARDIOVASCULAR:  Negative for chest pain or palpitations  RESPIRATORY:  Negative for cough, wheezing, or SOB   GASTROINTESTINAL:  Negative for nausea, vomiting, diarrhea, constipation, or abdominal pain + rectal bleed  GENITOURINARY:  Negative frequency, urgency or dysuria  NEUROLOGIC:  No headache, confusion, dizziness, lightheadedness  All other systems were reviewed and are negative     ALLERGIES  amoxicillin (Pruritus)  erythromycin (Pruritus)      Current inpatient medications :    ANTIBIOTICS/RELEVANT:  cefTRIAXone   IVPB 1000 milliGRAM(s) IV Intermittent every 24 hours    MEDICATIONS  (STANDING):  atorvastatin 40 milliGRAM(s) Oral at bedtime  dextrose 40% Gel 15 Gram(s) Oral once  dextrose 5%. 1000 milliLiter(s) (50 mL/Hr) IV Continuous <Continuous>  dextrose 5%. 1000 milliLiter(s) (100 mL/Hr) IV Continuous <Continuous>  dextrose 50% Injectable 25 Gram(s) IV Push once  dextrose 50% Injectable 12.5 Gram(s) IV Push once  dextrose 50% Injectable 25 Gram(s) IV Push once  diazepam    Tablet 5 milliGRAM(s) Oral at bedtime  FLUoxetine 20 milliGRAM(s) Oral daily  fluticasone propionate 50 MICROgram(s)/spray Nasal Spray 1 Spray(s) Both Nostrils two times a day  furosemide    Tablet 40 milliGRAM(s) Oral daily  glucagon  Injectable 1 milliGRAM(s) IntraMuscular once  insulin lispro (ADMELOG) corrective regimen sliding scale   SubCutaneous three times a day before meals  iohexol 300 mG (iodine)/mL Oral Solution 30 milliLiter(s) Oral once  loratadine 10 milliGRAM(s) Oral daily  oxybutynin 10 milliGRAM(s) Oral daily  pantoprazole    Tablet 40 milliGRAM(s) Oral before breakfast  polyethylene glycol/electrolyte Solution 1000 milliLiter(s) Oral every 6 hours  potassium chloride   Powder 40 milliEquivalent(s) Oral once    MEDICATIONS  (PRN):  oxyCODONE    IR 5 milliGRAM(s) Oral every 4 hours PRN Moderate Pain (4 - 6)  senna 2 Tablet(s) Oral at bedtime PRN for constipation      Objective:  Vital Signs Last 24 Hrs  T(C): 36.8 (10 Johnathan 2021 05:22), Max: 36.8 (2021 21:44)  T(F): 98.3 (10 Johnathan 2021 05:22), Max: 98.3 (10 Johnathan 2021 05:22)  HR: 84 (2021 21:44) (84 - 96)  BP: 149/82 (10 Johnathan 2021 06:33) (136/75 - 165/81)  RR: 18 (10 Johnathan 2021 05:22) (18 - 18)  SpO2: 95% (10 Johnathan 2021 05:22) (92% - 95%)    Physical Exam:  General: no acute distress  Neck: supple, trachea midline  Lungs: clear, no wheeze/rhonchi  Cardiovascular: regular rate and rhythm, S1 S2  Abdomen: soft, nontender,  bowel sounds normal  Neurological: alert and oriented x3  Skin: no rash  Extremities: right Hip surgical site c/d/i      LABS:                        8.3    5.21  )-----------( 226      ( 10 Johnathan 2021 08:00 )             25.7   06-10    140  |  103  |  7   ----------------------------<  150<H>  3.3<L>   |  27  |  0.44<L>    Ca    8.7      10 Johnathan 2021 08:00    TPro  6.9  /  Alb  3.0<L>  /  TBili  0.8  /  DBili  x   /  AST  22  /  ALT  27  /  AlkPhos  51        Urinalysis Basic - ( 2021 10:30 )    Color: Yellow / Appearance: Slightly Turbid / S.020 / pH: x  Gluc: x / Ketone: Trace  / Bili: Small / Urobili: 4 mg/dL   Blood: x / Protein: 100 mg/dL / Nitrite: Positive   Leuk Esterase: Moderate / RBC: 3-5 /HPF / WBC 6-10   Sq Epi: x / Non Sq Epi: Moderate / Bacteria: Moderate      MICROBIOLOGY:    Culture - Urine (collected 2021 22:20)  Source: .Urine Clean Catch (Midstream)  Final Report (2021 21:26):    <10,000 CFU/mL Normal Urogenital Ifeoma    Culture - Blood (collected 2021 16:35)  Source: .Blood Blood-Peripheral  Preliminary Report (2021 17:01):    No growth to date.    Culture - Blood (collected 2021 16:35)  Source: .Blood Blood-Peripheral  Preliminary Report (2021 17:01):    No growth to date.        RADIOLOGY & ADDITIONAL STUDIES:    EXAM:  CT ABDOMEN AND PELVIS OC                                  PROCEDURE DATE:  2021          INTERPRETATION:  CLINICAL INFORMATION: Fever evaluate for colitis    COMPARISON: None.    CONTRAST/COMPLICATIONS:  IV Contrast: NONE  Oral Contrast: Omnipaque 300  Complications: None reported at time of study completion    PROCEDURE:  CT of the Abdomen and Pelvis was performed.  Sagittal and coronal reformats were performed.    FINDINGS:  LOWER CHEST: Status post mitral valve surgery. Decreasecardiac chamber blood pool attenuation suggests an anemic state. Bibasilar atelectatic changes. Trace right pleural effusion. Asymmetric soft tissue density right breast. Correlate clinically and with dedicated breast imaging..    LIVER: Within normal limits.  BILE DUCTS: Normal caliber.  GALLBLADDER: Cholelithiasis.  SPLEEN: Mild splenomegaly (measuring up to 13.3 cm long dimension).  PANCREAS: Within normal limits.  ADRENALS: Within normal limits.  KIDNEYS/URETERS: Indeterminate 1.4 cm exophytic cortical lesion lower pole right kidney. Follow-up with a contrast-enhanced CT or MR..    BLADDER: Within normal limits.  REPRODUCTIVE ORGANS: Hysterectomy.    BOWEL: No bowel obstruction. Colonic diverticula without acute diverticulitis. Appendix no appendicitis  PERITONEUM: No ascites.  VESSELS: Nonaneurysmal.  RETROPERITONEUM/LYMPH NODES: No lymphadenopathy.  ABDOMINAL WALL: Within normal limits.  BONES: Right THR. Poorly defined fluid with a few scattered gas bubbles anterior to the right hip. This could represent evolving postoperative change. Cannot exclude infection in the appropriate setting. Consider contrast enhanced CT of the right hip as clinically indicated.    IMPRESSION:  Cholelithiasis.  Diverticulosis coli without evidence of diverticulitis or other active bowel inflammation.  Mild nonspecific splenomegaly.  Indeterminate cortical lesion right kidney. Follow-up with a contrast enhanced CT or MR (barring any contraindications).  Asymmetric soft tissue density right breast. Correlate with dedicated breast imaging.  Status post recent right THR. Poorly defined fluid and gas anterior to the right hip could represent postoperative change. Cannot exclude infection in the appropriate setting. Consider contrast enhanced CT of the right hip      Assessment :  75F CAD, HTN, HLD, DM 2, Anxiety, and s/p Right THR May 24 2021 admitted with fever and nausea   Possible UTI though Ucx NGTD  Sp fever  GIB- Sec ?diverticulosis  CT AP noted    Plan :   Cont Rocephin for now  Fu cultures  Trend temps and cbc  GI on case- Plan for colonoscopy tomorrow    Continue with present regiment.  Appropriate use of antibiotics and adverse effects reviewed.      > 35 minutes were spent in direct patient care reviewing notes, medications ,labs data/ imaging , discussion with multidisciplinary team.    Thank you for allowing me to participate in care of your patient .    Lexii Lazcano MD  Infectious Disease  [phone]
    s/p  colonosocpy with findings of divertcxiuli and bleeding hemorrhoids  plan  sitz bath  hydrocortsione suppository  ppi once a day  add fiber to the diet
  Patient is  77y/o Female status post Right anterior total hip arthroplasty , POD # 15  Patient admit with GI Bleed and pyelonephritis.  Patient seen at the bedside with Orthopedic Surgeon. Resting comfortably. Patient is in no pain. . Denies nausea, vomiting, chest pain, shortness of breath, , weakness, numbness. No new complaints.  Patient received 1 unit PRBC since admission.    Vital Signs Last 24 Hrs  T(C): 36.6 (09 Jun 2021 14:15), Max: 38.5 (09 Jun 2021 05:56)  T(F): 97.8 (09 Jun 2021 14:15), Max: 101.3 (09 Jun 2021 05:56)  HR: 96 (09 Jun 2021 14:15) (81 - 100)  BP: 146/73 (09 Jun 2021 14:15) (107/74 - 156/79)  BP(mean): --  RR: 18 (09 Jun 2021 14:15) (18 - 18)  SpO2: 95% (09 Jun 2021 14:15) (93% - 96%)                          8.3    6.54  )-----------( 206      ( 09 Jun 2021 06:53 )             25.7     06-09    137  |  101  |  12  ----------------------------<  147<H>  3.5   |  29  |  0.55    Ca    9.0      09 Jun 2021 06:53    TPro  6.9  /  Alb  3.0<L>  /  TBili  0.8  /  DBili  x   /  AST  22  /  ALT  27  /  AlkPhos  51  06-09      MEDICATIONS  (STANDING):  atorvastatin 40 milliGRAM(s) Oral at bedtime  cefTRIAXone   IVPB 1000 milliGRAM(s) IV Intermittent every 24 hours  dextrose 40% Gel 15 Gram(s) Oral once  dextrose 5%. 1000 milliLiter(s) (50 mL/Hr) IV Continuous <Continuous>  dextrose 5%. 1000 milliLiter(s) (100 mL/Hr) IV Continuous <Continuous>  dextrose 50% Injectable 25 Gram(s) IV Push once  dextrose 50% Injectable 12.5 Gram(s) IV Push once  dextrose 50% Injectable 25 Gram(s) IV Push once  diazepam    Tablet 5 milliGRAM(s) Oral at bedtime  FLUoxetine 20 milliGRAM(s) Oral daily  fluticasone propionate 50 MICROgram(s)/spray Nasal Spray 1 Spray(s) Both Nostrils two times a day  furosemide    Tablet 40 milliGRAM(s) Oral daily  glucagon  Injectable 1 milliGRAM(s) IntraMuscular once  insulin lispro (ADMELOG) corrective regimen sliding scale   SubCutaneous three times a day before meals  iohexol 300 mG (iodine)/mL Oral Solution 30 milliLiter(s) Oral once  loratadine 10 milliGRAM(s) Oral daily  oxybutynin 10 milliGRAM(s) Oral daily  pantoprazole    Tablet 40 milliGRAM(s) Oral before breakfast    MEDICATIONS  (PRN):  oxyCODONE    IR 5 milliGRAM(s) Oral every 4 hours PRN Moderate Pain (4 - 6)  senna 2 Tablet(s) Oral at bedtime PRN for constipation      Physical exam: The right hip dressing is clean, dry and intact. No drainage or discharge. Prineo tape removed No erythema is noted. No blistering. No ecchymosis. The calf is supple nontender. Passive range of motion is acceptable. No calf tenderness. Sensation to light touch is grossly intact distally. The lateral cutaneous nerve is intact. Motor function distally is 5/5. No foot drop. 2+ dorsalis pedis pulse. Capillary refill is less than 2 seconds. No cyanosis.    Primary Orthopedic Assessment:  s/p RIGHT ANTERIOR total hip replacement    Plan: Patient seen with Dr Shay  - DVT prophylaxis  including use of compression devices and ankle pumps( Eliquis stopped)  - Recomend physical therapy/Occupational therapy: Anterior Hip Precautions   - Weightbearing as tolerated of the Right lower extremity with assistance of a walker/cane  -  Incentive spirometry encouraged  -  Pain control as clinically indicated  -Medical f/u  Pt for possible colonoscopy Friday.
Patient is a 76y old  Female who presents with a chief complaint of Pyelonephritis, GI bleed (2021 10:01)      INTERVAL HPI/OVERNIGHT EVENTS:  Pt is seen and examined.  +fresh blood per rectum.  No other compaints.    Pain Location & Control:     MEDICATIONS  (STANDING):  atorvastatin 40 milliGRAM(s) Oral at bedtime  cefTRIAXone   IVPB 1000 milliGRAM(s) IV Intermittent every 24 hours  dextrose 40% Gel 15 Gram(s) Oral once  dextrose 5%. 1000 milliLiter(s) (50 mL/Hr) IV Continuous <Continuous>  dextrose 5%. 1000 milliLiter(s) (100 mL/Hr) IV Continuous <Continuous>  dextrose 50% Injectable 25 Gram(s) IV Push once  dextrose 50% Injectable 12.5 Gram(s) IV Push once  dextrose 50% Injectable 25 Gram(s) IV Push once  diazepam    Tablet 5 milliGRAM(s) Oral at bedtime  FLUoxetine 20 milliGRAM(s) Oral daily  fluticasone propionate 50 MICROgram(s)/spray Nasal Spray 1 Spray(s) Both Nostrils two times a day  furosemide    Tablet 40 milliGRAM(s) Oral daily  glucagon  Injectable 1 milliGRAM(s) IntraMuscular once  insulin lispro (ADMELOG) corrective regimen sliding scale   SubCutaneous three times a day before meals  iohexol 300 mG (iodine)/mL Oral Solution 30 milliLiter(s) Oral once  loratadine 10 milliGRAM(s) Oral daily  oxybutynin 10 milliGRAM(s) Oral daily  pantoprazole    Tablet 40 milliGRAM(s) Oral before breakfast    MEDICATIONS  (PRN):  oxyCODONE    IR 5 milliGRAM(s) Oral every 4 hours PRN Moderate Pain (4 - 6)  senna 2 Tablet(s) Oral at bedtime PRN for constipation      Allergies    amoxicillin (Pruritus)  erythromycin (Pruritus)    Intolerances      Vital Signs Last 24 Hrs  T(C): 36.8 (2021 10:02), Max: 38.5 (2021 05:56)  T(F): 98.3 (2021 10:02), Max: 101.3 (2021 05:56)  HR: 81 (2021 10:02) (81 - 100)  BP: 142/64 (2021 10:02) (107/74 - 156/79)  BP(mean): --  RR: 18 (2021 10:02) (18 - 18)  SpO2: 95% (2021 10:02) (93% - 96%)        LABS:                        8.3    6.54  )-----------( 206      ( 2021 06:53 )             25.7     2021 06:53    137    |  101    |  12     ----------------------------<  147    3.5     |  29     |  0.55     Ca    9.0        2021 06:53    TPro  6.9    /  Alb  3.0    /  TBili  0.8    /  DBili  x      /  AST  22     /  ALT  27     /  AlkPhos  51     2021 06:53      Urinalysis Basic - ( 2021 10:30 )    Color: Yellow / Appearance: Slightly Turbid / S.020 / pH: x  Gluc: x / Ketone: Trace  / Bili: Small / Urobili: 4 mg/dL   Blood: x / Protein: 100 mg/dL / Nitrite: Positive   Leuk Esterase: Moderate / RBC: 3-5 /HPF / WBC 6-10   Sq Epi: x / Non Sq Epi: Moderate / Bacteria: Moderate      CAPILLARY BLOOD GLUCOSE      POCT Blood Glucose.: 148 mg/dL (2021 10:09)  POCT Blood Glucose.: 159 mg/dL (2021 08:00)  POCT Blood Glucose.: 135 mg/dL (2021 20:40)  POCT Blood Glucose.: 182 mg/dL (2021 16:19)  POCT Blood Glucose.: 140 mg/dL (2021 13:08)        Cultures    Lactate, Blood: 2.4 mmol/L ( @ 17:18)  Lactate, Blood: 2.0 mmol/L ( @ 14:11)        RADIOLOGY & ADDITIONAL TESTS:    Imaging Personally Reviewed:  [ ] YES  [ ] NO    Consultant(s) Notes Reviewed:  [ ] YES  [ ] NO    Care Discussed with Consultants/Other Providers [x ] YES  [ ] NO
Patient is a 76y old  Female who presents with a chief complaint of Pyelonephritis, GI bleed (10 Johnathan 2021 09:51)      INTERVAL HPI/OVERNIGHT EVENTS:  Pt is seen and examined.  feels ok. +BRBPR today.    Pain Location & Control:     MEDICATIONS  (STANDING):  atorvastatin 40 milliGRAM(s) Oral at bedtime  cefTRIAXone   IVPB 1000 milliGRAM(s) IV Intermittent every 24 hours  dextrose 40% Gel 15 Gram(s) Oral once  dextrose 5%. 1000 milliLiter(s) (50 mL/Hr) IV Continuous <Continuous>  dextrose 5%. 1000 milliLiter(s) (100 mL/Hr) IV Continuous <Continuous>  dextrose 50% Injectable 25 Gram(s) IV Push once  dextrose 50% Injectable 12.5 Gram(s) IV Push once  dextrose 50% Injectable 25 Gram(s) IV Push once  diazepam    Tablet 5 milliGRAM(s) Oral at bedtime  FLUoxetine 20 milliGRAM(s) Oral daily  fluticasone propionate 50 MICROgram(s)/spray Nasal Spray 1 Spray(s) Both Nostrils two times a day  furosemide    Tablet 40 milliGRAM(s) Oral daily  glucagon  Injectable 1 milliGRAM(s) IntraMuscular once  insulin lispro (ADMELOG) corrective regimen sliding scale   SubCutaneous three times a day before meals  iohexol 300 mG (iodine)/mL Oral Solution 30 milliLiter(s) Oral once  loratadine 10 milliGRAM(s) Oral daily  oxybutynin 10 milliGRAM(s) Oral daily  pantoprazole    Tablet 40 milliGRAM(s) Oral before breakfast  polyethylene glycol/electrolyte Solution 1000 milliLiter(s) Oral every 6 hours  potassium chloride    Tablet ER 40 milliEquivalent(s) Oral once    MEDICATIONS  (PRN):  oxyCODONE    IR 5 milliGRAM(s) Oral every 4 hours PRN Moderate Pain (4 - 6)  senna 2 Tablet(s) Oral at bedtime PRN for constipation      Allergies    amoxicillin (Pruritus)  erythromycin (Pruritus)    Intolerances      Vital Signs Last 24 Hrs  T(C): 36.8 (10 Johnathan 2021 05:22), Max: 36.8 (09 Jun 2021 21:44)  T(F): 98.3 (10 Johnathan 2021 05:22), Max: 98.3 (10 Johnathan 2021 05:22)  HR: 84 (09 Jun 2021 21:44) (84 - 96)  BP: 149/82 (10 Johnathan 2021 06:33) (136/75 - 165/81)  BP(mean): --  RR: 18 (10 Johnathan 2021 05:22) (18 - 18)  SpO2: 95% (10 Johnathan 2021 05:22) (92% - 95%)        LABS:                        8.3    5.21  )-----------( 226      ( 10 Johnathan 2021 08:00 )             25.7     10 Johnathan 2021 08:00    140    |  103    |  7      ----------------------------<  150    3.3     |  27     |  0.44     Ca    8.7        10 Johnathan 2021 08:00          CAPILLARY BLOOD GLUCOSE      POCT Blood Glucose.: 190 mg/dL (10 Johnathan 2021 07:43)  POCT Blood Glucose.: 129 mg/dL (09 Jun 2021 21:05)  POCT Blood Glucose.: 123 mg/dL (09 Jun 2021 16:36)  POCT Blood Glucose.: 173 mg/dL (09 Jun 2021 12:32)        Cultures  Culture Results:   <10,000 CFU/mL Normal Urogenital Ifeoma (06-08 @ 22:20)  Culture Results:   No growth to date. (06-08 @ 16:35)  Culture Results:   No growth to date. (06-08 @ 16:35)        Culture - Urine (collected 06-08-21 @ 22:20)  Source: .Urine Clean Catch (Midstream)  Final Report (06-09-21 @ 21:26):    <10,000 CFU/mL Normal Urogenital Ifeoma    Culture - Blood (collected 06-08-21 @ 16:35)  Source: .Blood Blood-Peripheral  Preliminary Report (06-09-21 @ 17:01):    No growth to date.    Culture - Blood (collected 06-08-21 @ 16:35)  Source: .Blood Blood-Peripheral  Preliminary Report (06-09-21 @ 17:01):    No growth to date.        RADIOLOGY & ADDITIONAL TESTS:    Imaging Personally Reviewed:  [ ] YES  [ ] NO    Consultant(s) Notes Reviewed:  [ ] YES  [ ] NO    Care Discussed with Consultants/Other Providers [ x] YES  [ ] NO

## 2021-06-11 NOTE — DISCHARGE NOTE PROVIDER - CARE PROVIDER_API CALL
Evon Mcrae (DO)  Internal Medicine  Internal Medicine, [address] [place] [address]  Phone: [phone]  Fax: [phone]  Follow Up Time:     Jones Almodovar (DO)  Internal Medicine  [address] [place] [address]  Phone: [phone]  Fax: [phone]  Follow Up Time:

## 2021-06-11 NOTE — DISCHARGE NOTE PROVIDER - NSDCCPCAREPLAN_GEN_ALL_CORE_FT
PRINCIPAL DISCHARGE DIAGNOSIS  Diagnosis: Fever  Assessment and Plan of Treatment: #Pyelonephritis  urine culture NGTD  seen by ID, D/C IV antibiotics.  s/p rocephin  #GI bleed likely from hemorrhoid  s/p  colonosocpy with findings of divertcxiuli and bleeding hemorrhoids  plan  sitz bath  hydrocortsione suppository  ppi once a day  add fiber to the diet.  f/u GI in 7 days.  #S/P Right THR  Palan as per orho.  #HTN  continue BP meds with hold parameters  #HLD  continue statins  #Anxiety   continue SSRIs  #DM2  continue home meds.      SECONDARY DISCHARGE DIAGNOSES  Diagnosis: Acute pyelonephritis  Assessment and Plan of Treatment:     Diagnosis: GI bleed  Assessment and Plan of Treatment:      PRINCIPAL DISCHARGE DIAGNOSIS  Diagnosis: Fever  Assessment and Plan of Treatment: #Pyelonephritis  urine culture NGTD  seen by ID, D/C IV antibiotics.  s/p rocephin  #GI bleed likely from hemorrhoid  s/p  colonosocpy with findings of divertcxiuli and bleeding hemorrhoids  plan  sitz bath  hydrocortsione suppository  ppi once a day  add fiber to the diet.  f/u GI in 7 days.  #S/P Right THR  Palan as per orho.  #HTN  continue BP meds with hold parameters  #HLD  continue statins  #Anxiety   continue SSRIs  #DM2  continue home meds.

## 2021-06-12 VITALS
SYSTOLIC BLOOD PRESSURE: 128 MMHG | HEART RATE: 73 BPM | TEMPERATURE: 98 F | DIASTOLIC BLOOD PRESSURE: 71 MMHG | RESPIRATION RATE: 16 BRPM | OXYGEN SATURATION: 97 %

## 2021-06-12 LAB
ANION GAP SERPL CALC-SCNC: 6 MMOL/L — SIGNIFICANT CHANGE UP (ref 5–17)
BUN SERPL-MCNC: 11 MG/DL — SIGNIFICANT CHANGE UP (ref 7–23)
CALCIUM SERPL-MCNC: 8.8 MG/DL — SIGNIFICANT CHANGE UP (ref 8.4–10.5)
CHLORIDE SERPL-SCNC: 101 MMOL/L — SIGNIFICANT CHANGE UP (ref 96–108)
CO2 SERPL-SCNC: 30 MMOL/L — SIGNIFICANT CHANGE UP (ref 22–31)
CREAT SERPL-MCNC: 0.52 MG/DL — SIGNIFICANT CHANGE UP (ref 0.5–1.3)
GLUCOSE BLDC GLUCOMTR-MCNC: 179 MG/DL — HIGH (ref 70–99)
GLUCOSE BLDC GLUCOMTR-MCNC: 195 MG/DL — HIGH (ref 70–99)
GLUCOSE SERPL-MCNC: 169 MG/DL — HIGH (ref 70–99)
HCT VFR BLD CALC: 27.8 % — LOW (ref 34.5–45)
HGB BLD-MCNC: 8.8 G/DL — LOW (ref 11.5–15.5)
MCHC RBC-ENTMCNC: 27.5 PG — SIGNIFICANT CHANGE UP (ref 27–34)
MCHC RBC-ENTMCNC: 31.7 GM/DL — LOW (ref 32–36)
MCV RBC AUTO: 86.9 FL — SIGNIFICANT CHANGE UP (ref 80–100)
NRBC # BLD: 0 /100 WBCS — SIGNIFICANT CHANGE UP (ref 0–0)
PLATELET # BLD AUTO: 243 K/UL — SIGNIFICANT CHANGE UP (ref 150–400)
POTASSIUM SERPL-MCNC: 3.8 MMOL/L — SIGNIFICANT CHANGE UP (ref 3.5–5.3)
POTASSIUM SERPL-SCNC: 3.8 MMOL/L — SIGNIFICANT CHANGE UP (ref 3.5–5.3)
RBC # BLD: 3.2 M/UL — LOW (ref 3.8–5.2)
RBC # FLD: 14.4 % — SIGNIFICANT CHANGE UP (ref 10.3–14.5)
SODIUM SERPL-SCNC: 137 MMOL/L — SIGNIFICANT CHANGE UP (ref 135–145)
WBC # BLD: 6.99 K/UL — SIGNIFICANT CHANGE UP (ref 3.8–10.5)
WBC # FLD AUTO: 6.99 K/UL — SIGNIFICANT CHANGE UP (ref 3.8–10.5)

## 2021-06-12 PROCEDURE — 86901 BLOOD TYPING SEROLOGIC RH(D): CPT

## 2021-06-12 PROCEDURE — 99285 EMERGENCY DEPT VISIT HI MDM: CPT

## 2021-06-12 PROCEDURE — 86850 RBC ANTIBODY SCREEN: CPT

## 2021-06-12 PROCEDURE — 93971 EXTREMITY STUDY: CPT

## 2021-06-12 PROCEDURE — 36430 TRANSFUSION BLD/BLD COMPNT: CPT

## 2021-06-12 PROCEDURE — 97116 GAIT TRAINING THERAPY: CPT

## 2021-06-12 PROCEDURE — 94640 AIRWAY INHALATION TREATMENT: CPT

## 2021-06-12 PROCEDURE — 97110 THERAPEUTIC EXERCISES: CPT

## 2021-06-12 PROCEDURE — 80048 BASIC METABOLIC PNL TOTAL CA: CPT

## 2021-06-12 PROCEDURE — 82962 GLUCOSE BLOOD TEST: CPT

## 2021-06-12 PROCEDURE — 86923 COMPATIBILITY TEST ELECTRIC: CPT

## 2021-06-12 PROCEDURE — 96365 THER/PROPH/DIAG IV INF INIT: CPT

## 2021-06-12 PROCEDURE — 96361 HYDRATE IV INFUSION ADD-ON: CPT

## 2021-06-12 PROCEDURE — 87040 BLOOD CULTURE FOR BACTERIA: CPT

## 2021-06-12 PROCEDURE — 87086 URINE CULTURE/COLONY COUNT: CPT

## 2021-06-12 PROCEDURE — 86803 HEPATITIS C AB TEST: CPT

## 2021-06-12 PROCEDURE — 99239 HOSP IP/OBS DSCHRG MGMT >30: CPT

## 2021-06-12 PROCEDURE — 71045 X-RAY EXAM CHEST 1 VIEW: CPT

## 2021-06-12 PROCEDURE — 97161 PT EVAL LOW COMPLEX 20 MIN: CPT

## 2021-06-12 PROCEDURE — 85027 COMPLETE CBC AUTOMATED: CPT

## 2021-06-12 PROCEDURE — 36415 COLL VENOUS BLD VENIPUNCTURE: CPT

## 2021-06-12 PROCEDURE — 83605 ASSAY OF LACTIC ACID: CPT

## 2021-06-12 PROCEDURE — 86900 BLOOD TYPING SEROLOGIC ABO: CPT

## 2021-06-12 PROCEDURE — 85025 COMPLETE CBC W/AUTO DIFF WBC: CPT

## 2021-06-12 PROCEDURE — 74176 CT ABD & PELVIS W/O CONTRAST: CPT

## 2021-06-12 PROCEDURE — 86769 SARS-COV-2 COVID-19 ANTIBODY: CPT

## 2021-06-12 PROCEDURE — P9016: CPT

## 2021-06-12 PROCEDURE — 81001 URINALYSIS AUTO W/SCOPE: CPT

## 2021-06-12 PROCEDURE — 87635 SARS-COV-2 COVID-19 AMP PRB: CPT

## 2021-06-12 PROCEDURE — 83690 ASSAY OF LIPASE: CPT

## 2021-06-12 PROCEDURE — 80053 COMPREHEN METABOLIC PANEL: CPT

## 2021-06-12 RX ORDER — PANTOPRAZOLE SODIUM 20 MG/1
1 TABLET, DELAYED RELEASE ORAL
Qty: 0 | Refills: 0 | DISCHARGE
Start: 2021-06-12

## 2021-06-12 RX ORDER — NYSTATIN CREAM 100000 [USP'U]/G
1 CREAM TOPICAL
Qty: 0 | Refills: 0 | DISCHARGE

## 2021-06-12 RX ORDER — HYDROCORTISONE 1 %
1 OINTMENT (GRAM) TOPICAL
Qty: 14 | Refills: 0
Start: 2021-06-12 | End: 2021-06-25

## 2021-06-12 RX ORDER — HYDROCORTISONE 1 %
1 OINTMENT (GRAM) TOPICAL
Qty: 15 | Refills: 0
Start: 2021-06-12 | End: 2021-06-26

## 2021-06-12 RX ORDER — PSYLLIUM SEED (WITH DEXTROSE)
3.4 POWDER (GRAM) ORAL
Qty: 51 | Refills: 0
Start: 2021-06-12 | End: 2021-06-26

## 2021-06-12 RX ORDER — FUROSEMIDE 40 MG
1 TABLET ORAL
Qty: 0 | Refills: 0 | DISCHARGE
Start: 2021-06-12

## 2021-06-12 RX ORDER — ALUMINUM ZIRCONIUM TRICHLOROHYDREX GLY 0.2 G/G
1 STICK TOPICAL
Qty: 0 | Refills: 0 | DISCHARGE

## 2021-06-12 RX ORDER — PSYLLIUM SEED (WITH DEXTROSE)
17 POWDER (GRAM) ORAL
Qty: 510 | Refills: 0
Start: 2021-06-12 | End: 2021-07-11

## 2021-06-12 RX ORDER — FUROSEMIDE 40 MG
1 TABLET ORAL
Qty: 0 | Refills: 0 | DISCHARGE

## 2021-06-12 RX ORDER — DIPHENOXYLATE HCL/ATROPINE 2.5-.025MG
2 TABLET ORAL
Qty: 0 | Refills: 0 | DISCHARGE

## 2021-06-12 RX ORDER — METOPROLOL TARTRATE 50 MG
3 TABLET ORAL
Qty: 0 | Refills: 0 | DISCHARGE
Start: 2021-06-12

## 2021-06-12 RX ADMIN — Medication 1: at 07:48

## 2021-06-12 RX ADMIN — Medication 1 SPRAY(S): at 05:47

## 2021-06-12 RX ADMIN — PANTOPRAZOLE SODIUM 40 MILLIGRAM(S): 20 TABLET, DELAYED RELEASE ORAL at 05:47

## 2021-06-12 RX ADMIN — Medication 20 MILLIGRAM(S): at 11:03

## 2021-06-12 RX ADMIN — Medication 1 SUPPOSITORY(S): at 11:28

## 2021-06-12 RX ADMIN — Medication 40 MILLIGRAM(S): at 05:47

## 2021-06-12 RX ADMIN — Medication 10 MILLIGRAM(S): at 11:03

## 2021-06-12 RX ADMIN — LORATADINE 10 MILLIGRAM(S): 10 TABLET ORAL at 11:03

## 2021-06-12 RX ADMIN — Medication 150 MILLIGRAM(S): at 05:47

## 2021-06-12 NOTE — PROGRESS NOTE ADULT - PROVIDER SPECIALTY LIST ADULT
Gastroenterology
Routing refill request to provider for review/approval because:  Labs out of range:  Sodium  Labs not current:  Creatinine, Potassium, Sodium    RHETT Bowie, RN  [facility]      
Infectious Disease
Gastroenterology
Infectious Disease
Infectious Disease
Orthopedics
Gastroenterology
Gastroenterology
Hospitalist

## 2021-06-12 NOTE — PROGRESS NOTE ADULT - REASON FOR ADMISSION
Pyelonephritis, GI bleed

## 2021-06-12 NOTE — PROGRESS NOTE ADULT - NSICDXPILOT_GEN_ALL_CORE
[place]

## 2021-06-12 NOTE — PROGRESS NOTE ADULT - ASSESSMENT
Diarrhea/Ab pain/LGIB- this is likely a diverticular bleed which has since slowed down- likely exacerbated by Eliquis  CT noted  pyelonephritis on Ceftriaxone    IVF  IVAB per ID  monitor cbc Q12, transfuse if Hgb <8  maintain T&S  Angelica-Colace (50/8.6) 2 tabs po bid  Miralax or Glycolax podow, # 527g/1bottle, sig. 17g , dissolve in 1 glass of water, po daily, prn    Analpram-HC (1* cortison, 1* pramoxine) rectal cream  Anosol-HC (1*) rectal cream daily to bid (after BM and after shower)  AnaMantle HC (lidocaine 3*, Hydrocortidsone 0.5*) cream x 30, roly 1 pr daily    Senna 1-2 tabs qhs and colace 100mg three times a day  More vagetables and fluits, less fine foods.  Drink more water and excercise more  Avoid long time sitting position  Manaul message lower rectal-cindy area when taking shower.  TUCKs medicated pads and sits marker study  If symptoms persist will need colorectal surgery follow up      herminio alvarado  Gastroenterology   [facility]  [address] [place] [id number]  [phone]    ACP (advance care planning). Plan: Advanced care planning was discussed with patient and family.  Advanced care planning forms were reviewed and discussed.  Risks, benefits and alternatives of gastroenterologic procedures were discussed in detail and all questions were answered.    30 minutes spent.

## 2021-06-13 LAB
CULTURE RESULTS: SIGNIFICANT CHANGE UP
CULTURE RESULTS: SIGNIFICANT CHANGE UP
SPECIMEN SOURCE: SIGNIFICANT CHANGE UP
SPECIMEN SOURCE: SIGNIFICANT CHANGE UP

## 2021-06-14 RX ORDER — OXYCODONE 5 MG/1
5 TABLET ORAL
Qty: 40 | Refills: 0 | Status: ACTIVE | COMMUNITY
Start: 2021-06-14 | End: 1900-01-01

## 2021-06-17 ENCOUNTER — APPOINTMENT (OUTPATIENT)
Dept: SURGERY | Facility: CLINIC | Age: 76
End: 2021-06-17

## 2021-06-25 ENCOUNTER — APPOINTMENT (OUTPATIENT)
Dept: ORTHOPEDIC SURGERY | Facility: CLINIC | Age: 76
End: 2021-06-25

## 2021-06-25 ENCOUNTER — APPOINTMENT (OUTPATIENT)
Dept: ORTHOPEDIC SURGERY | Facility: CLINIC | Age: 76
End: 2021-06-25
Payer: MEDICARE

## 2021-06-25 PROCEDURE — 73502 X-RAY EXAM HIP UNI 2-3 VIEWS: CPT | Mod: RT

## 2021-06-25 PROCEDURE — 99024 POSTOP FOLLOW-UP VISIT: CPT

## 2021-06-25 RX ORDER — OXYCODONE 5 MG/1
5 TABLET ORAL
Qty: 30 | Refills: 0 | Status: ACTIVE | COMMUNITY
Start: 2021-06-25 | End: 1900-01-01

## 2021-06-30 RX ORDER — DICLOFENAC SODIUM 3 G/100G
3 GEL TOPICAL TWICE DAILY
Qty: 1 | Refills: 2 | Status: ACTIVE | COMMUNITY
Start: 2021-06-25

## 2021-07-16 ENCOUNTER — APPOINTMENT (OUTPATIENT)
Dept: ORTHOPEDIC SURGERY | Facility: CLINIC | Age: 76
End: 2021-07-16
Payer: MEDICARE

## 2021-07-16 PROCEDURE — 99024 POSTOP FOLLOW-UP VISIT: CPT

## 2021-07-16 PROCEDURE — 73502 X-RAY EXAM HIP UNI 2-3 VIEWS: CPT | Mod: RT

## 2021-07-16 NOTE — HISTORY OF PRESENT ILLNESS
[4] : the patient reports pain that is 4/10 in severity [Clean/Dry/Intact] : clean, dry and intact [Neuro Intact] : an unremarkable neurological exam [Vascular Intact] : ~T peripheral vascular exam normal [Negative Parish's] : maneuvers demonstrated a negative Parish's sign [Doing Well] : is doing well [Excellent Pain Control] : has excellent pain control [No Sign of Infection] : is showing no signs of infection [Erythema] : not erythematous [Dehiscence] : not dehisced [de-identified] : right THR 5/24/21 [de-identified] : 77 y/o female s/p right total hip replacement on 5/24/21. Patient was hospitalized shortly after  for bleeding hemorrhoids and UTI and was unable to make her 2 weeks follow up appointment. She has been ambulating without a cane at home but uses the cane for longer distances. She takes oxycodone for pain only at night. She states she has some stiffness and groin pain when first getting up to walk, which loosens up over time. Overall she is doing well. [de-identified] : incision clean and dry. Spit suture proximally removed in office today by Dr. Shay.\par leg lengths appear grossly equal\par negative CHICO/FADIR\par hip flexion to 120 degrees, ER to 35 degrees, IR to 15 degrees, ABD to 45 degrees, ADD to 20 degrees [de-identified] : Xray hip: Right total hip replacement well fixed, well fitted and in good alignment. no obvious fracture.  [de-identified] : Patient will initiate outpatient PT. Recommend she take Celebrex pending approval from her GI specialist. She will follow up in 4 months.

## 2021-08-04 DIAGNOSIS — Z86.59 PERSONAL HISTORY OF OTHER MENTAL AND BEHAVIORAL DISORDERS: ICD-10-CM

## 2021-08-04 DIAGNOSIS — Z83.3 FAMILY HISTORY OF DIABETES MELLITUS: ICD-10-CM

## 2021-08-04 DIAGNOSIS — Z83.6 FAMILY HISTORY OF OTHER DISEASES OF THE RESPIRATORY SYSTEM: ICD-10-CM

## 2021-08-04 DIAGNOSIS — Z82.49 FAMILY HISTORY OF ISCHEMIC HEART DISEASE AND OTHER DISEASES OF THE CIRCULATORY SYSTEM: ICD-10-CM

## 2021-08-04 DIAGNOSIS — Z87.81 PERSONAL HISTORY OF (HEALED) TRAUMATIC FRACTURE: ICD-10-CM

## 2021-08-04 DIAGNOSIS — Z83.49 FAMILY HISTORY OF OTHER ENDOCRINE, NUTRITIONAL AND METABOLIC DISEASES: ICD-10-CM

## 2021-08-04 DIAGNOSIS — Z98.890 OTHER SPECIFIED POSTPROCEDURAL STATES: ICD-10-CM

## 2021-08-04 DIAGNOSIS — E78.5 HYPERLIPIDEMIA, UNSPECIFIED: ICD-10-CM

## 2021-08-04 DIAGNOSIS — Z87.19 PERSONAL HISTORY OF OTHER DISEASES OF THE DIGESTIVE SYSTEM: ICD-10-CM

## 2021-08-04 RX ORDER — NYSTATIN 100000 [USP'U]/G
100000 CREAM TOPICAL
Refills: 0 | Status: ACTIVE | COMMUNITY

## 2021-08-04 RX ORDER — DIPHENOXYLATE HYDROCHLORIDE AND ATROPINE SULFATE 2.5; .025 MG/1; MG/1
TABLET ORAL
Refills: 0 | Status: ACTIVE | COMMUNITY

## 2021-08-04 RX ORDER — FUROSEMIDE 40 MG/1
40 TABLET ORAL DAILY
Refills: 0 | Status: ACTIVE | COMMUNITY
Start: 2017-10-12

## 2021-08-04 RX ORDER — METFORMIN HYDROCHLORIDE 625 MG/1
TABLET ORAL
Refills: 0 | Status: ACTIVE | COMMUNITY

## 2021-09-13 ENCOUNTER — APPOINTMENT (OUTPATIENT)
Dept: SURGERY | Facility: CLINIC | Age: 76
End: 2021-09-13
Payer: MEDICARE

## 2021-09-13 VITALS
WEIGHT: 176 LBS | SYSTOLIC BLOOD PRESSURE: 149 MMHG | TEMPERATURE: 98.2 F | OXYGEN SATURATION: 96 % | DIASTOLIC BLOOD PRESSURE: 79 MMHG | HEIGHT: 65 IN | BODY MASS INDEX: 29.32 KG/M2 | HEART RATE: 87 BPM

## 2021-09-13 PROCEDURE — 99213 OFFICE O/P EST LOW 20 MIN: CPT

## 2021-09-17 ENCOUNTER — APPOINTMENT (OUTPATIENT)
Dept: ORTHOPEDIC SURGERY | Facility: CLINIC | Age: 76
End: 2021-09-17
Payer: MEDICARE

## 2021-09-17 VITALS
HEIGHT: 65 IN | SYSTOLIC BLOOD PRESSURE: 147 MMHG | DIASTOLIC BLOOD PRESSURE: 77 MMHG | WEIGHT: 176 LBS | BODY MASS INDEX: 29.32 KG/M2 | HEART RATE: 87 BPM

## 2021-09-17 DIAGNOSIS — M25.552 PAIN IN LEFT HIP: ICD-10-CM

## 2021-09-17 DIAGNOSIS — Z96.641 AFTERCARE FOLLOWING JOINT REPLACEMENT SURGERY: ICD-10-CM

## 2021-09-17 DIAGNOSIS — Z96.641 PRESENCE OF RIGHT ARTIFICIAL HIP JOINT: ICD-10-CM

## 2021-09-17 DIAGNOSIS — M16.12 UNILATERAL PRIMARY OSTEOARTHRITIS, LEFT HIP: ICD-10-CM

## 2021-09-17 DIAGNOSIS — Z47.1 AFTERCARE FOLLOWING JOINT REPLACEMENT SURGERY: ICD-10-CM

## 2021-09-17 PROCEDURE — 20610 DRAIN/INJ JOINT/BURSA W/O US: CPT | Mod: RT

## 2021-09-17 PROCEDURE — 73521 X-RAY EXAM HIPS BI 2 VIEWS: CPT

## 2021-09-17 PROCEDURE — 99214 OFFICE O/P EST MOD 30 MIN: CPT | Mod: 25

## 2021-09-17 NOTE — HISTORY OF PRESENT ILLNESS
[de-identified] : 76-year-old female presents for followup status post right anterior total hip arthroplasty that was done May 2021. Her postoperative course was complicated due to the hemorrhagic hemorrhoids and these were read hospitalization and blood transfusion. She had a delay in her initial therapy because of this hospitalization but she now just recently completed physical therapy she is doing well with regards to her right hip she is complaining more of left hip pain over the lateral aspect of her hip. She is to have left hip trochanteric bursitis before she is unable to lay on her left side. The pain in her left hip is Idania 10 again it's worse when lying on the left side she denies any groin pain she has no difficulty putting shoes and socks on that side and has no orthopedic complaints.

## 2021-09-17 NOTE — DISCUSSION/SUMMARY
[Medication Risks Reviewed] : Medication risks reviewed [de-identified] : Right hip replacement\par Left hip osteoarthritis and greater trochanteric bursitis\par \par \par \par Patient is doing well with regards to her right hip replacement she continue activity as tolerated and continue home stretching strengthening program as is her left hip she does have osteoarthritis noted but most of her pain is coming over the greater trochanteric bursa she has had a steroid injection which helped significantly the past and would like to proceed with a steroid injection in the left greater trochanteric bursa. Risks benefits alternatives were trochanteric bursa injection were reviewed the patient postinjection instructions are given. We'll plan to see patient back in a year for the day there surgery or sooner if they have any problems or concerns\par \par \par Total of 40 minutes was spent with the patient

## 2021-09-17 NOTE — PROCEDURE
[Injection] : Injection [Left] : of the left [Greater Trochanter] : greater trochanteric bursa [Inflammation] : Inflammation [Patient] : patient [Risk] : risk [Benefits] : benefits [Alternatives] : alternatives [Bleeding] : bleeding [Infection] : infection [Allergic Reaction] : allergic reaction [Verbal Consent Obtained] : verbal consent was obtained prior to the procedure [Ethyl Chloride Spray] : ethyl chloride spray was used as a topical anesthetic [Lateral] : lateral [Direct] : direct [20] : a 20-gauge [Spinal] : spinal needle [1% Lidocaine___(mL)] : [unfilled] mL of 1% Lidocaine [Methylpred. 40mg/mL___(mL)] : [unfilled] mL 40mg/mL methylprednisolone [Bandage Applied] : a bandage [None] : none [No Strenuous Activity___day(s)] : avoid strenuous activity for [unfilled] day(s) [PRN] : as needed [de-identified] : pain [FreeTextEntry1] : chlorahexadine

## 2021-09-17 NOTE — REASON FOR VISIT
[Initial Visit] : an initial visit for [Artificial Hip Joint] : artificial hip joint [FreeTextEntry2] : right THR 5/24/21

## 2021-09-17 NOTE — PHYSICAL EXAM
[Antalgic] : antalgic [LE] : Sensory: Intact in bilateral lower extremities [DP] : dorsalis pedis 2+ and symmetric bilaterally [PT] : posterior tibial 2+ and symmetric bilaterally [Normal] : no peripheral adenopathy appreciated [de-identified] : Examination of the right hip: the skin is warm and dry with no lesions is a well-healed surgical scar ligaments are grossly equal is no bony or soft tissue tenderness range of motion hip flexion zero 100 external rotation 0-40 internal rotation 0-30.\par \par Examination of left hip: skin is warm and dry with no lesions there is tenderness over the greater trochanteric bursa extending into the IT band there is no significant pain with range of motion the range of motion is decreased hip flexion to 90 external rotation 0-30 internal rotation 0 20 [de-identified] : One view of the pelvis and one view of the right and left hip: the right hip has total hip replacement implants in place is no signs of osteolysis or loosening there is no acute bony abnormalities the left hip is arthritic changes joint space narrowing. Osteophytes and cystic changes there is also some calcification noted over the greater trochantericregion.

## 2022-03-21 ENCOUNTER — APPOINTMENT (OUTPATIENT)
Dept: SURGERY | Facility: CLINIC | Age: 77
End: 2022-03-21
Payer: MEDICARE

## 2022-03-21 VITALS
TEMPERATURE: 97.9 F | BODY MASS INDEX: 30.32 KG/M2 | WEIGHT: 182 LBS | OXYGEN SATURATION: 95 % | SYSTOLIC BLOOD PRESSURE: 138 MMHG | HEIGHT: 65 IN | DIASTOLIC BLOOD PRESSURE: 70 MMHG | HEART RATE: 89 BPM

## 2022-03-21 PROCEDURE — 99213 OFFICE O/P EST LOW 20 MIN: CPT

## 2022-05-17 NOTE — ED ADULT TRIAGE NOTE - AS HEIGHT TYPE
Renetta Motta is a 84 year old female patient is in for follow up, left ring finger pain          Medications: medications verified and updated    Refills needed today?  NO    Tobacco History:verified     denies Latex allergy or sensitivity      Health Maintenance Due   Topic Date Due   • Shingles Vaccine (1 of 2) Never done   • DM/CKD Microalbumin  08/28/2020   • COVID-19 Vaccine (3 - Booster for Pfizer series) 11/03/2021       Patient is due for topics as listed above but is not proceeding with Immunization(s) COVID-19 and Shingles and DM/CKD Microalbumin at this time.           
cc: Primary hypertension  (primary encounter diagnosis)    Tachycardia    Chronic renal impairment, stage 3a (CMS/[facility])       Patient Active Problem List    Diagnosis Date Noted   • Tachycardia 05/04/2022     Priority: Low   • Prediabetes      Priority: Low   • Chronic renal insufficiency, stage III (moderate) (CMS/[facility])      Priority: Low   • Anemia, unspecified 08/23/2011     Priority: Low   • Hypertension      Priority: Low       OBJECTIVE:  The patient is a 84 year old  female who appears healthy,alert,in no distress.  BUILD:  Normal  HEART: PMI normal, regular rate and rhythm, S1 and S2 normal, no S3 or S4, with no gallops, murmurs or rubs and no clicks    ASSESSMENT: Primary hypertension  (primary encounter diagnosis)  Workup was essentially negative   Was started on atenolol 50    Tachycardia  Much better and rate controlled on beta-blocker    Chronic renal impairment, stage 3a (CMS/HCC)       Her trigger finger   Referral to orthopedics  
stated

## 2022-09-22 ENCOUNTER — APPOINTMENT (OUTPATIENT)
Dept: SURGERY | Facility: CLINIC | Age: 77
End: 2022-09-22

## 2022-09-22 VITALS
DIASTOLIC BLOOD PRESSURE: 69 MMHG | SYSTOLIC BLOOD PRESSURE: 131 MMHG | HEART RATE: 82 BPM | TEMPERATURE: 98.1 F | HEIGHT: 65 IN | WEIGHT: 181 LBS | OXYGEN SATURATION: 95 % | BODY MASS INDEX: 30.16 KG/M2

## 2022-09-22 PROCEDURE — 99213 OFFICE O/P EST LOW 20 MIN: CPT

## 2022-10-25 ENCOUNTER — INPATIENT (INPATIENT)
Facility: HOSPITAL | Age: 77
LOS: 4 days | Discharge: HOME | DRG: 93 | End: 2022-10-30
Attending: STUDENT IN AN ORGANIZED HEALTH CARE EDUCATION/TRAINING PROGRAM | Admitting: STUDENT IN AN ORGANIZED HEALTH CARE EDUCATION/TRAINING PROGRAM
Payer: MEDICARE

## 2022-10-25 VITALS
HEART RATE: 100 BPM | HEIGHT: 64 IN | DIASTOLIC BLOOD PRESSURE: 63 MMHG | WEIGHT: 179.9 LBS | SYSTOLIC BLOOD PRESSURE: 109 MMHG | OXYGEN SATURATION: 96 % | RESPIRATION RATE: 18 BRPM | TEMPERATURE: 98 F

## 2022-10-25 DIAGNOSIS — D72.829 ELEVATED WHITE BLOOD CELL COUNT, UNSPECIFIED: ICD-10-CM

## 2022-10-25 DIAGNOSIS — Z98.89 OTHER SPECIFIED POSTPROCEDURAL STATES: Chronic | ICD-10-CM

## 2022-10-25 DIAGNOSIS — Z98.890 OTHER SPECIFIED POSTPROCEDURAL STATES: Chronic | ICD-10-CM

## 2022-10-25 DIAGNOSIS — R29.898 OTHER SYMPTOMS AND SIGNS INVOLVING THE MUSCULOSKELETAL SYSTEM: ICD-10-CM

## 2022-10-25 DIAGNOSIS — Z96.651 PRESENCE OF RIGHT ARTIFICIAL KNEE JOINT: Chronic | ICD-10-CM

## 2022-10-25 DIAGNOSIS — F41.9 ANXIETY DISORDER, UNSPECIFIED: ICD-10-CM

## 2022-10-25 DIAGNOSIS — I10 ESSENTIAL (PRIMARY) HYPERTENSION: ICD-10-CM

## 2022-10-25 DIAGNOSIS — Z29.9 ENCOUNTER FOR PROPHYLACTIC MEASURES, UNSPECIFIED: ICD-10-CM

## 2022-10-25 DIAGNOSIS — I25.10 ATHEROSCLEROTIC HEART DISEASE OF NATIVE CORONARY ARTERY WITHOUT ANGINA PECTORIS: ICD-10-CM

## 2022-10-25 DIAGNOSIS — K21.9 GASTRO-ESOPHAGEAL REFLUX DISEASE WITHOUT ESOPHAGITIS: ICD-10-CM

## 2022-10-25 DIAGNOSIS — N39.0 URINARY TRACT INFECTION, SITE NOT SPECIFIED: ICD-10-CM

## 2022-10-25 DIAGNOSIS — Z98.49 CATARACT EXTRACTION STATUS, UNSPECIFIED EYE: Chronic | ICD-10-CM
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
## 2022-10-25 DIAGNOSIS — Z79.899 OTHER LONG TERM (CURRENT) DRUG THERAPY: ICD-10-CM

## 2022-10-25 DIAGNOSIS — Z95.1 PRESENCE OF AORTOCORONARY BYPASS GRAFT: Chronic | ICD-10-CM

## 2022-10-25 DIAGNOSIS — E11.9 TYPE 2 DIABETES MELLITUS WITHOUT COMPLICATIONS: ICD-10-CM

## 2022-10-25 DIAGNOSIS — R29.6 REPEATED FALLS: ICD-10-CM

## 2022-10-25 LAB
ALBUMIN SERPL ELPH-MCNC: 3.5 G/DL — SIGNIFICANT CHANGE UP (ref 3.3–5)
ALP SERPL-CCNC: 37 U/L — LOW (ref 40–120)
ALT FLD-CCNC: 33 U/L — SIGNIFICANT CHANGE UP (ref 12–78)
ANION GAP SERPL CALC-SCNC: 8 MMOL/L — SIGNIFICANT CHANGE UP (ref 5–17)
APPEARANCE UR: CLEAR — SIGNIFICANT CHANGE UP
AST SERPL-CCNC: 38 U/L — HIGH (ref 15–37)
BASOPHILS # BLD AUTO: 0.16 K/UL — SIGNIFICANT CHANGE UP (ref 0–0.2)
BASOPHILS NFR BLD AUTO: 1 % — SIGNIFICANT CHANGE UP (ref 0–2)
BILIRUB SERPL-MCNC: 1 MG/DL — SIGNIFICANT CHANGE UP (ref 0.2–1.2)
BILIRUB UR-MCNC: NEGATIVE — SIGNIFICANT CHANGE UP
BUN SERPL-MCNC: 17 MG/DL — SIGNIFICANT CHANGE UP (ref 7–23)
CALCIUM SERPL-MCNC: 9.6 MG/DL — SIGNIFICANT CHANGE UP (ref 8.5–10.1)
CHLORIDE SERPL-SCNC: 104 MMOL/L — SIGNIFICANT CHANGE UP (ref 96–108)
CO2 SERPL-SCNC: 24 MMOL/L — SIGNIFICANT CHANGE UP (ref 22–31)
COLOR SPEC: YELLOW — SIGNIFICANT CHANGE UP
CREAT SERPL-MCNC: 0.63 MG/DL — SIGNIFICANT CHANGE UP (ref 0.5–1.3)
DIFF PNL FLD: NEGATIVE — SIGNIFICANT CHANGE UP
EGFR: 92 ML/MIN/1.73M2 — SIGNIFICANT CHANGE UP
EOSINOPHIL # BLD AUTO: 0.32 K/UL — SIGNIFICANT CHANGE UP (ref 0–0.5)
EOSINOPHIL NFR BLD AUTO: 2 % — SIGNIFICANT CHANGE UP (ref 0–6)
GLUCOSE SERPL-MCNC: 55 MG/DL — LOW (ref 70–99)
GLUCOSE UR QL: 1000 MG/DL
HCT VFR BLD CALC: 32.8 % — LOW (ref 34.5–45)
HGB BLD-MCNC: 11 G/DL — LOW (ref 11.5–15.5)
KETONES UR-MCNC: ABNORMAL
LACTATE SERPL-SCNC: 1.2 MMOL/L — SIGNIFICANT CHANGE UP (ref 0.7–2)
LEUKOCYTE ESTERASE UR-ACNC: NEGATIVE — SIGNIFICANT CHANGE UP
LYMPHOCYTES # BLD AUTO: 15 % — SIGNIFICANT CHANGE UP (ref 13–44)
LYMPHOCYTES # BLD AUTO: 2.41 K/UL — SIGNIFICANT CHANGE UP (ref 1–3.3)
MAGNESIUM SERPL-MCNC: 2 MG/DL — SIGNIFICANT CHANGE UP (ref 1.6–2.6)
MCHC RBC-ENTMCNC: 28.6 PG — SIGNIFICANT CHANGE UP (ref 27–34)
MCHC RBC-ENTMCNC: 33.5 GM/DL — SIGNIFICANT CHANGE UP (ref 32–36)
MCV RBC AUTO: 85.4 FL — SIGNIFICANT CHANGE UP (ref 80–100)
MONOCYTES # BLD AUTO: 2.25 K/UL — HIGH (ref 0–0.9)
MONOCYTES NFR BLD AUTO: 14 % — SIGNIFICANT CHANGE UP (ref 2–14)
NEUTROPHILS # BLD AUTO: 8.84 K/UL — HIGH (ref 1.8–7.4)
NEUTROPHILS NFR BLD AUTO: 55 % — SIGNIFICANT CHANGE UP (ref 43–77)
NITRITE UR-MCNC: NEGATIVE — SIGNIFICANT CHANGE UP
NRBC # BLD: SIGNIFICANT CHANGE UP /100 WBCS (ref 0–0)
PH UR: 5 — SIGNIFICANT CHANGE UP (ref 5–8)
PLATELET # BLD AUTO: 83 K/UL — LOW (ref 150–400)
POTASSIUM SERPL-MCNC: 4.3 MMOL/L — SIGNIFICANT CHANGE UP (ref 3.5–5.3)
POTASSIUM SERPL-SCNC: 4.3 MMOL/L — SIGNIFICANT CHANGE UP (ref 3.5–5.3)
PROT SERPL-MCNC: 8 G/DL — SIGNIFICANT CHANGE UP (ref 6–8.3)
PROT UR-MCNC: NEGATIVE — SIGNIFICANT CHANGE UP
RBC # BLD: 3.84 M/UL — SIGNIFICANT CHANGE UP (ref 3.8–5.2)
RBC # FLD: 16.3 % — HIGH (ref 10.3–14.5)
SARS-COV-2 RNA SPEC QL NAA+PROBE: SIGNIFICANT CHANGE UP
SODIUM SERPL-SCNC: 136 MMOL/L — SIGNIFICANT CHANGE UP (ref 135–145)
SP GR SPEC: 1.01 — SIGNIFICANT CHANGE UP (ref 1.01–1.02)
UROBILINOGEN FLD QL: NEGATIVE — SIGNIFICANT CHANGE UP
WBC # BLD: 16.07 K/UL — HIGH (ref 3.8–10.5)
WBC # FLD AUTO: 16.07 K/UL — HIGH (ref 3.8–10.5)

## 2022-10-25 PROCEDURE — 70496 CT ANGIOGRAPHY HEAD: CPT | Mod: 26,MA

## 2022-10-25 PROCEDURE — 99285 EMERGENCY DEPT VISIT HI MDM: CPT | Mod: FS

## 2022-10-25 PROCEDURE — 71045 X-RAY EXAM CHEST 1 VIEW: CPT | Mod: 26

## 2022-10-25 PROCEDURE — 73522 X-RAY EXAM HIPS BI 3-4 VIEWS: CPT | Mod: 26

## 2022-10-25 PROCEDURE — 72131 CT LUMBAR SPINE W/O DYE: CPT | Mod: 26,MA

## 2022-10-25 PROCEDURE — 99223 1ST HOSP IP/OBS HIGH 75: CPT | Mod: GC

## 2022-10-25 PROCEDURE — 73552 X-RAY EXAM OF FEMUR 2/>: CPT | Mod: 26,RT

## 2022-10-25 PROCEDURE — 70498 CT ANGIOGRAPHY NECK: CPT | Mod: 26,MA

## 2022-10-25 RX ORDER — DEXTROSE 50 % IN WATER 50 %
15 SYRINGE (ML) INTRAVENOUS ONCE
Refills: 0 | Status: DISCONTINUED | OUTPATIENT
Start: 2022-10-25 | End: 2022-10-30

## 2022-10-25 RX ORDER — DEXTROSE 50 % IN WATER 50 %
25 SYRINGE (ML) INTRAVENOUS ONCE
Refills: 0 | Status: DISCONTINUED | OUTPATIENT
Start: 2022-10-25 | End: 2022-10-30

## 2022-10-25 RX ORDER — SODIUM CHLORIDE 9 MG/ML
1000 INJECTION INTRAMUSCULAR; INTRAVENOUS; SUBCUTANEOUS ONCE
Refills: 0 | Status: COMPLETED | OUTPATIENT
Start: 2022-10-25 | End: 2022-10-25

## 2022-10-25 RX ORDER — DULAGLUTIDE 4.5 MG/.5ML
1 INJECTION, SOLUTION SUBCUTANEOUS
Qty: 0 | Refills: 0 | DISCHARGE

## 2022-10-25 RX ORDER — GLIMEPIRIDE 1 MG
2 TABLET ORAL
Qty: 0 | Refills: 0 | DISCHARGE

## 2022-10-25 RX ORDER — SODIUM CHLORIDE 9 MG/ML
1000 INJECTION, SOLUTION INTRAVENOUS
Refills: 0 | Status: DISCONTINUED | OUTPATIENT
Start: 2022-10-25 | End: 2022-10-30

## 2022-10-25 RX ORDER — CHOLECALCIFEROL (VITAMIN D3) 125 MCG
1 CAPSULE ORAL
Qty: 0 | Refills: 0 | DISCHARGE

## 2022-10-25 RX ORDER — FLUTICASONE PROPIONATE 50 MCG
1 SPRAY, SUSPENSION NASAL
Qty: 0 | Refills: 0 | DISCHARGE

## 2022-10-25 RX ORDER — GLUCAGON INJECTION, SOLUTION 0.5 MG/.1ML
1 INJECTION, SOLUTION SUBCUTANEOUS ONCE
Refills: 0 | Status: DISCONTINUED | OUTPATIENT
Start: 2022-10-25 | End: 2022-10-30

## 2022-10-25 RX ORDER — FEXOFENADINE HCL AND PSEUDOEPHEDRINE HCI 60; 120 MG/1; MG/1
1 TABLET, EXTENDED RELEASE ORAL
Qty: 0 | Refills: 0 | DISCHARGE

## 2022-10-25 RX ORDER — CEFTRIAXONE 500 MG/1
1000 INJECTION, POWDER, FOR SOLUTION INTRAMUSCULAR; INTRAVENOUS EVERY 24 HOURS
Refills: 0 | Status: DISCONTINUED | OUTPATIENT
Start: 2022-10-25 | End: 2022-10-25

## 2022-10-25 RX ORDER — GLIMEPIRIDE 1 MG
8 TABLET ORAL
Qty: 0 | Refills: 0 | DISCHARGE

## 2022-10-25 RX ORDER — FENOFIBRATE,MICRONIZED 130 MG
145 CAPSULE ORAL DAILY
Refills: 0 | Status: DISCONTINUED | OUTPATIENT
Start: 2022-10-25 | End: 2022-10-30

## 2022-10-25 RX ORDER — OXYBUTYNIN CHLORIDE 5 MG
10 TABLET ORAL DAILY
Refills: 0 | Status: DISCONTINUED | OUTPATIENT
Start: 2022-10-25 | End: 2022-10-30

## 2022-10-25 RX ORDER — OMEPRAZOLE 10 MG/1
1 CAPSULE, DELAYED RELEASE ORAL
Qty: 0 | Refills: 0 | DISCHARGE

## 2022-10-25 RX ORDER — CEFTRIAXONE 500 MG/1
1000 INJECTION, POWDER, FOR SOLUTION INTRAMUSCULAR; INTRAVENOUS EVERY 24 HOURS
Refills: 0 | Status: ACTIVE | OUTPATIENT
Start: 2022-10-25 | End: 2022-10-28

## 2022-10-25 RX ORDER — ATORVASTATIN CALCIUM 80 MG/1
40 TABLET, FILM COATED ORAL AT BEDTIME
Refills: 0 | Status: DISCONTINUED | OUTPATIENT
Start: 2022-10-25 | End: 2022-10-30

## 2022-10-25 RX ORDER — ONDANSETRON 8 MG/1
4 TABLET, FILM COATED ORAL EVERY 8 HOURS
Refills: 0 | Status: DISCONTINUED | OUTPATIENT
Start: 2022-10-25 | End: 2022-10-30

## 2022-10-25 RX ORDER — DIAZEPAM 5 MG
5 TABLET ORAL AT BEDTIME
Refills: 0 | Status: DISCONTINUED | OUTPATIENT
Start: 2022-10-25 | End: 2022-10-30

## 2022-10-25 RX ORDER — PANTOPRAZOLE SODIUM 20 MG/1
40 TABLET, DELAYED RELEASE ORAL
Refills: 0 | Status: DISCONTINUED | OUTPATIENT
Start: 2022-10-25 | End: 2022-10-30

## 2022-10-25 RX ORDER — INSULIN LISPRO 100/ML
VIAL (ML) SUBCUTANEOUS AT BEDTIME
Refills: 0 | Status: DISCONTINUED | OUTPATIENT
Start: 2022-10-25 | End: 2022-10-30

## 2022-10-25 RX ORDER — LANOLIN ALCOHOL/MO/W.PET/CERES
3 CREAM (GRAM) TOPICAL AT BEDTIME
Refills: 0 | Status: DISCONTINUED | OUTPATIENT
Start: 2022-10-25 | End: 2022-10-30

## 2022-10-25 RX ORDER — ACETAMINOPHEN 500 MG
650 TABLET ORAL EVERY 6 HOURS
Refills: 0 | Status: DISCONTINUED | OUTPATIENT
Start: 2022-10-25 | End: 2022-10-30

## 2022-10-25 RX ORDER — DEXTROSE 50 % IN WATER 50 %
12.5 SYRINGE (ML) INTRAVENOUS ONCE
Refills: 0 | Status: DISCONTINUED | OUTPATIENT
Start: 2022-10-25 | End: 2022-10-30

## 2022-10-25 RX ORDER — FLUOXETINE HCL 10 MG
20 CAPSULE ORAL DAILY
Refills: 0 | Status: DISCONTINUED | OUTPATIENT
Start: 2022-10-25 | End: 2022-10-30

## 2022-10-25 RX ORDER — ENOXAPARIN SODIUM 100 MG/ML
40 INJECTION SUBCUTANEOUS EVERY 24 HOURS
Refills: 0 | Status: DISCONTINUED | OUTPATIENT
Start: 2022-10-25 | End: 2022-10-30

## 2022-10-25 RX ORDER — INSULIN LISPRO 100/ML
VIAL (ML) SUBCUTANEOUS
Refills: 0 | Status: DISCONTINUED | OUTPATIENT
Start: 2022-10-25 | End: 2022-10-30

## 2022-10-25 RX ORDER — METFORMIN HYDROCHLORIDE 850 MG/1
1 TABLET ORAL
Qty: 0 | Refills: 0 | DISCHARGE

## 2022-10-25 RX ORDER — FUROSEMIDE 40 MG
40 TABLET ORAL DAILY
Refills: 0 | Status: DISCONTINUED | OUTPATIENT
Start: 2022-10-25 | End: 2022-10-30

## 2022-10-25 RX ORDER — METOPROLOL TARTRATE 50 MG
150 TABLET ORAL DAILY
Refills: 0 | Status: DISCONTINUED | OUTPATIENT
Start: 2022-10-25 | End: 2022-10-30

## 2022-10-25 RX ORDER — MECLIZINE HCL 12.5 MG
0 TABLET ORAL
Qty: 0 | Refills: 0 | DISCHARGE

## 2022-10-25 RX ORDER — ASCORBIC ACID 60 MG
2 TABLET,CHEWABLE ORAL
Qty: 0 | Refills: 0 | DISCHARGE

## 2022-10-25 RX ORDER — EMPAGLIFLOZIN 10 MG/1
1 TABLET, FILM COATED ORAL
Qty: 0 | Refills: 0 | DISCHARGE

## 2022-10-25 RX ORDER — OXYBUTYNIN CHLORIDE 5 MG
10 TABLET ORAL DAILY
Refills: 0 | Status: DISCONTINUED | OUTPATIENT
Start: 2022-10-25 | End: 2022-10-25

## 2022-10-25 RX ADMIN — ENOXAPARIN SODIUM 40 MILLIGRAM(S): 100 INJECTION SUBCUTANEOUS at 23:51

## 2022-10-25 RX ADMIN — Medication 0: at 22:12

## 2022-10-25 RX ADMIN — Medication 5 MILLIGRAM(S): at 23:51

## 2022-10-25 RX ADMIN — ATORVASTATIN CALCIUM 40 MILLIGRAM(S): 80 TABLET, FILM COATED ORAL at 23:51

## 2022-10-25 RX ADMIN — SODIUM CHLORIDE 1000 MILLILITER(S): 9 INJECTION INTRAMUSCULAR; INTRAVENOUS; SUBCUTANEOUS at 16:13

## 2022-10-25 RX ADMIN — CEFTRIAXONE 100 MILLIGRAM(S): 500 INJECTION, POWDER, FOR SOLUTION INTRAMUSCULAR; INTRAVENOUS at 23:51

## 2022-10-25 NOTE — ED PROVIDER NOTE - OBJECTIVE STATEMENT
77-year-old female past medical history anxiety coronary artery disease hypertension GERD type 2 diabetes mitral valve replacement presents to the ER with complaints of frequent falls.  Patient reports about a week ago had cloudy urine and was diagnosed with a UTI.  Started an antibiotic that made her have diarrhea so she stopped.  The antibiotic was changed to Bactrim however patient did not take it today as she feels she is not getting better.  Patient has suffered multiple falls this week.  Reports right leg weakness x1 week.  Prior to this was able to ambulate independently but has been falling due to weakness of the right leg.  Now complains of pain to the right anterior thigh.  Denies fever chills nausea vomiting diarrhea.  No other weakness numbness or tingling.  Denies neck pain and back pain. No saddle anesthesia 77-year-old female past medical history anxiety coronary artery disease hypertension GERD type 2 diabetes mitral valve replacement presents to the ER with complaints of frequent falls.  Patient reports about a week ago had cloudy urine and was diagnosed with a UTI.  Started an antibiotic that made her have diarrhea so she stopped.  The antibiotic was changed to Bactrim however patient did not take it today as she feels she is not getting better.  Patient has suffered multiple falls this week.  Reports right leg weakness x1 week which was prior to falls.  Prior to this was able to ambulate independently but has been falling due to weakness of the right leg.  Now complains of pain to the right anterior thigh.  Denies fever chills nausea vomiting diarrhea.  No other weakness numbness or tingling.  Denies neck pain and back pain. No saddle anesthesia

## 2022-10-25 NOTE — ED PROVIDER NOTE - CARE PLAN
Principal Discharge DX:	Right leg weakness  Secondary Diagnosis:	Unable to ambulate  Secondary Diagnosis:	Leukocytosis   1

## 2022-10-25 NOTE — PHYSICAL THERAPY INITIAL EVALUATION ADULT - NAME OF CLINICIAN
Spoke to patient on labs see note below     Patient verbalized understanding     AT [facility]         ----- Message from JULIAN Schroeder sent at 2/3/2022  8:55 AM EST -----  Laboratories acceptable.  Please let patient know no evidence of inflammatory process at this time.     MD Dr Genao

## 2022-10-25 NOTE — ED PROVIDER NOTE - NSICDXFAMILYHX_GEN_ALL_CORE_FT
DIABETES INSTRUCTIONS:    1. Check your blood sugars before your first meal and before lunch or dinner.    2. See us again in about 3 months. Bring your meter to your appointment.    3. Call us if your blood sugars are less than 70 mg/dL or greater than 250 mg/dL for more than two readings in a row.    4. Continue your oral medications for now. Increase the Jardiance to 25 mg daily.     5. Try taking your pravastatin with dinner.    ROXANA Mills, ANP-BC  [facility]        INSULIN INSTRUCTIONS    Take long acting insulin Lantus 54 units nightly.          Recommended Blood Glucose Targets    · Fasting and Premeal  mg/dL  · Other times  mg/dL    
FAMILY HISTORY:  Father  Still living? No  Congestive heart failure, Age at diagnosis: Age Unknown  FH: type 2 diabetes, Age at diagnosis: Age Unknown    Mother  Still living? No  Family history of breast cancer in mother, Age at diagnosis: Age Unknown  Family history of osteoarthritis, Age at diagnosis: Age Unknown    Sibling  Still living? Yes, Estimated age: 71-80  FH: type 2 diabetes, Age at diagnosis: Age Unknown

## 2022-10-25 NOTE — ED ADULT TRIAGE NOTE - CHIEF COMPLAINT QUOTE
Pt sent in for [facility] for further eval of RLE weakness x 7 days as well as frequent falls over the past week.   Pt states she was seen 1 week ago at [facility] and treated for a UTI.  Pt initially reports cloudy urine and dysuria which has now improved after abx.   Pt developed diarrhea following abx treatment and when she returned to [facility] for reeval she was told to come to ED.   Pt with equal and adequate strength to BL upper extremities, no facial asymmetry, no acute neuro deficits but reports her right leg as "heavy"a nd painful to move.  RLE heaviness/pain was reported prior to pt falling.   Pt estimated ~ 3 falls this week, denies dizziness prior to falls, denies head injury, pt takes 81mg ASA q d.  BN Pt sent in for [facility] for further eval of RLE weakness x 7 days as well as frequent falls over the past week.   Pt states she was seen 1 week ago at [facility] and treated for a UTI.  Pt initially reports cloudy urine and dysuria which has now improved after abx.   Pt developed diarrhea following abx treatment, states she had one episode of diarrhea with bright red blood, and when she returned to [facility] for reeval she was told to come to ED.   Pt with equal and adequate strength to BL upper extremities, no facial asymmetry, no acute neuro deficits but reports her right leg as "heavy"a nd painful to move.  RLE heaviness/pain was reported prior to pt falling.   Pt estimated ~ 3 falls this week, denies dizziness prior to falls, denies head injury, pt takes 81mg ASA q d.  (hx of blood transfusions 1 year ago for GI bleeding).  BN

## 2022-10-25 NOTE — H&P ADULT - PROBLEM SELECTOR PLAN 6
-Continue home asprin and statin.  -BP Control  -Dash/TLC diet  -Keep K>4.0, Mg>2.0 -Continue home aspirin and statin.  -BP Control  -CC/Dash/TLC diet Chronic   -Continue home metoprolol 150mg qD, furosemide 40mg qD w/ hold parameters  -Monitor hemodynamics -Continue home aspirin, statin, fenofibrate.  -BP Control  -CC/Dash/TLC diet

## 2022-10-25 NOTE — ED PROVIDER NOTE - PHYSICAL EXAMINATION
PE:   GEN: Awake, alert, interactive, NAD, non-toxic appearing.   HEAD: Atraumatic  EYES: Sclera white, conjunctiva pink, PERRLA  MOUTH/THROAT: Patent, uvula midline, no tonsillar edema or erythema, no acute dental findings, no oral lesions or ulcerations, no Hoarse voice  CARDIAC: Reg rate and rhythm, S1,S2, no murmur/rub/gallop. Strong central and peripheral pulses, Brisk cap refill, no evident pedal edema.   RESP: No distress noted. L/S clear = Bilat without accessory muscle use, wheeze, rhonchi, rales.   ABD: soft, supple, non-tender, no guarding. BS x 4, normoactive.   NEURO: AOx3, CN II-XII grossly intact without focal deficit.   MSK: Moving all extremities with no apparent deformities. R leg weakness, no decreased sensation no deformity, no midline c/t/l spine TTP, pelvis stable   SKIN: Warm, dry, normal color, without apparent rashes.

## 2022-10-25 NOTE — H&P ADULT - PROBLEM SELECTOR PLAN 7
Continue home -Continue home aspirin, statin, fenofibrate.  -BP Control  -CC/Dash/TLC diet Continue home fluoxetine 20mg qD, diazepam 5mg qD.

## 2022-10-25 NOTE — H&P ADULT - PROBLEM SELECTOR PLAN 3
WBC 16k on admission being treated for UTI  -Likely 2/2 UTI  -UA+ Urine/Blood cultures as above  -Trend white count, monitor for signs and symptoms of infection Pt brought home medicine with her; however it appears she has missed some bottle from home and some doses may be changed. Medication list daughter/ brought may be outdated. Unable to reach pharmacies [facility]. Please f/u in AM

## 2022-10-25 NOTE — ED PROVIDER NOTE - NS ED ATTENDING STATEMENT MOD
This was a shared visit with the ELAINA. I reviewed and verified the documentation and independently performed the documented:

## 2022-10-25 NOTE — H&P ADULT - ATTENDING COMMENTS
77 F w/ PMH CAD, HTN, anxiety, GERD, DM Type II, IBS presents w/ falls x1 week w/ recent treatment of UTI. Admitted with frequent falls, sepsis 2/2 uti.     Agree with above. Edited personally where appropriate into the body of the note.

## 2022-10-25 NOTE — H&P ADULT - PROBLEM SELECTOR PLAN 5
Chronic, baseline BP XXX/XX as per pt  -Continue home XXXX  -Monitor hemodynamics On home metformin, trulicity and Jardiance. Not on home insulin.  -Hypoglycemic to 55 on admission in the setting of decreased PO intake  -AM/ A1C  -Low ISS, reevaluate need for insulin in AM  -Regular finger sticks  -CC/Dash/TLC diet  -Hypoglycemic protocol. Chronic   -Continue home metoprolol 150mg qD, furosemide 40mg qD w/ hold parameters  -Monitor hemodynamics

## 2022-10-25 NOTE — ED ADULT NURSE NOTE - CHIEF COMPLAINT QUOTE
Pt sent in for [facility] for further eval of RLE weakness x 7 days as well as frequent falls over the past week.   Pt states she was seen 1 week ago at [facility] and treated for a UTI.  Pt initially reports cloudy urine and dysuria which has now improved after abx.   Pt developed diarrhea following abx treatment and when she returned to [facility] for reeval she was told to come to ED.   Pt with equal and adequate strength to BL upper extremities, no facial asymmetry, no acute neuro deficits but reports her right leg as "heavy"a nd painful to move.  RLE heaviness/pain was reported prior to pt falling.   Pt estimated ~ 3 falls this week, denies dizziness prior to falls, denies head injury, pt takes 81mg ASA q d.  BN

## 2022-10-25 NOTE — PHYSICAL THERAPY INITIAL EVALUATION ADULT - DIAGNOSIS, PT EVAL
Physical Therapy Discharge Summary    Reason for therapy discharge:    Discharged to hospice.    Progress towards therapy goal(s). See goals on Care Plan in Epic electronic health record for goal details.  Goals not met.  Barriers to achieving goals:   limited tolerance for therapy and discharge from facility.    Therapy recommendation(s):    Continued therapy is recommended.  Rationale/Recommendations:  TCU initially recommended, but per chart review patient transitioned to IP Hospice.       Muscle Weakness

## 2022-10-25 NOTE — H&P ADULT - PROBLEM SELECTOR PLAN 1
Pure mechanical fall at ground level without head trauma; denies any LOC, dizziness, palpitations, prior to falls, likely 2/2 weakness associated with UTI/possible hypoglycemia  -10/25 CT head neck spine: Negative for fracture or acute bleeds  -10/25 Xray chest, hip/pelvis, femur: No pneumonia or fractures  -Fall precautions  -Pt recommends JOHN frequent falls over the last week, likely in the setting of sepsis.   -10/25 CT head neck spine: Negative for fracture or acute bleeds  -10/25 Xray chest, hip/pelvis, femur: No pneumonia or fractures  -Fall precautions  -Pt recommends JOHN

## 2022-10-25 NOTE — H&P ADULT - PROBLEM SELECTOR PLAN 2
Complicated at pt is 77; Treated outpt with unknown antibiotic and TMP-SMX  -Afebrile w/ leukocytosis 16k. Meets sepsis criteria w/ WBC and HR at admission.  -Pt denies any urinary symptoms currently, no CVA tenderness noted  -UA/UCx ordered via straight cath  -2x blood culture  -Lactate 1.2 on admission  -Monitor for signs and symptoms of infection Complicated at pt is 77; Treated outpt with unknown antibiotic and TMP-SMX, on jardiance at home for DM2  -Afebrile w/ leukocytosis 16k. Meets sepsis criteria w/ WBC and HR at admission.  -Pt denies any urinary symptoms currently, no CVA tenderness noted  -UA/UCx ordered via straight cath  -2x blood culture  -Lactate 1.2 on admission  -Monitor for signs and symptoms of infection Complicated at pt is 77; Treated outpt with unknown antibiotic and TMP-SMX, on jardiance at home for DM2  -Afebrile w/ leukocytosis 16k. Meets sepsis criteria w/ WBC and HR at admission.  -Pt denies any urinary symptoms currently, no CVA tenderness noted  -Start ceftriaxone 1g daily  -UA/UCx ordered via straight cath  -2x blood culture  -Lactate 1.2 on admission  -Monitor for signs and symptoms of infection Uncomplicated; Treated outpt with unknown antibiotic and TMP-SMX, on jardiance at home for DM2  -Afebrile w/ leukocytosis 16k. Meets sepsis criteria w/ WBC and HR at admission.  -Pt denies any urinary symptoms currently, no CVA tenderness noted  -Start ceftriaxone 1g daily  -UA/UCx ordered via straight cath  -2x blood culture  -Lactate 1.2 on admission  -Monitor for signs and symptoms of infection  -ID consult Treated outpt with unknown antibiotic and TMP-SMX, on jardiance at home for DM2  -Afebrile w/ leukocytosis 16k. Meets sepsis criteria w/ WBC and HR at admission.  -Start ceftriaxone 1g daily  -UA/UCx ordered via straight cath  -2x blood culture  -Lactate 1.2 on admission  -Monitor for signs and symptoms of infection  -ID consult

## 2022-10-25 NOTE — H&P ADULT - NSHPPHYSICALEXAM_GEN_ALL_CORE
GENERAL:  Not in acute distress, lying in bed comfortably  HEENT:  NC/AT,  PERRLA  CHEST:  CTAB  HEART:  Regular rate and rhythm, no murmurs, rubs, gallops  ABDOMEN:  Soft, non-tender, non-distended,  EXTREMITIES:  no cyanosis, no LE edema  SKIN:  Warm, well perfused  NEURO:  Alert, Conversing appropriately, A&Ox3  PSYCH: Not emotionally labile, appropriate affect    Vital Signs Last 24 Hrs  T(C): 36.6 (25 Oct 2022 14:46), Max: 36.6 (25 Oct 2022 14:46)  T(F): 97.9 (25 Oct 2022 14:46), Max: 97.9 (25 Oct 2022 14:46)  HR: 100 (25 Oct 2022 14:46) (100 - 100)  BP: 109/63 (25 Oct 2022 14:46) (109/63 - 109/63)  RR: 18 (25 Oct 2022 14:46) (18 - 18)  SpO2: 96% (25 Oct 2022 14:46) (96% - 96%) GENERAL:  Not in acute distress, lying in bed comfortably  HEENT:  NC/AT,  PERRLA  CHEST:  CTAB  HEART:  Regular rate and rhythm, no murmurs, rubs, gallops  ABDOMEN:  Soft, non-tender, non-distended,  EXTREMITIES:  no cyanosis, no LE edema  SKIN:  Warm, well perfused  NEURO: Alert, Conversing appropriately, A&Ox3  PSYCH: Not emotionally labile, appropriate affect    Vital Signs Last 24 Hrs  T(C): 36.6 (25 Oct 2022 14:46), Max: 36.6 (25 Oct 2022 14:46)  T(F): 97.9 (25 Oct 2022 14:46), Max: 97.9 (25 Oct 2022 14:46)  HR: 100 (25 Oct 2022 14:46) (100 - 100)  BP: 109/63 (25 Oct 2022 14:46) (109/63 - 109/63)  RR: 18 (25 Oct 2022 14:46) (18 - 18)  SpO2: 96% (25 Oct 2022 14:46) (96% - 96%) GENERAL:  Not in acute distress, lying in bed comfortably  HEENT:  NC/AT,  PERRLA  CHEST:  CTA B/L  HEART:  Regular rate and rhythm, no murmurs, rubs, gallops  ABDOMEN:  Soft, non-tender, non-distended,  EXTREMITIES:  no cyanosis, no LE edema, pulses 3/4 x4 extremities  SKIN:  Warm, well perfused  MSK: Mild point tenderness to right anterior thigh, No CVA tenderness, 5/5 strength x4 extremities  NEURO: Seemly confused at times when answering questions. Alert, A&Ox3, no focal deficits, CN II-XII intact, Neurovascularly intact x4 extremities  PSYCH: Not emotionally labile, appropriate affect    Vital Signs Last 24 Hrs  T(C): 36.6 (25 Oct 2022 14:46), Max: 36.6 (25 Oct 2022 14:46)  T(F): 97.9 (25 Oct 2022 14:46), Max: 97.9 (25 Oct 2022 14:46)  HR: 100 (25 Oct 2022 14:46) (100 - 100)  BP: 109/63 (25 Oct 2022 14:46) (109/63 - 109/63)  RR: 18 (25 Oct 2022 14:46) (18 - 18)  SpO2: 96% (25 Oct 2022 14:46) (96% - 96%)

## 2022-10-25 NOTE — H&P ADULT - PROBLEM SELECTOR PLAN 9
Lovenox 40mg qD, for DVT prophylaxis    Dispo  -PT reccomending JOHN  -Social work consult. Continue home PPI 20mg Lovenox 40mg qD, for DVT prophylaxis    Dispo  -PT recommending JOHN  -Social work consult.

## 2022-10-25 NOTE — ED PROVIDER NOTE - PROGRESS NOTE DETAILS
pt seen by PT who suggests admission for rehab placement? discussd with Dr Broussard who accepts pt for admission

## 2022-10-25 NOTE — H&P ADULT - NSHPSOCIALHISTORY_GEN_ALL_CORE
Lives at _____ with _____  Completes all ADLs independently  Ambulates without walker  Social Alcohol use  Denies smoking history  Denies drug use Lives at home with  and daughter  Completes all ADLs independently  Ambulates without walker, however has used a cane since the weekend  Social Alcohol use  Denies smoking history  Denies drug use

## 2022-10-25 NOTE — ED ADULT NURSE NOTE - OBJECTIVE STATEMENT
Received pt in bed alert and oriented x4.  C/O Received pt in bed alert and oriented x4.  C/O  uti.  Pt has been having recent falls at home. About a week ago, pt had complaints of cloudy urine and was placed on antb.  Pt stated she was getting diarrhea and then stopped her medication.  Denies chest pain, sob.

## 2022-10-25 NOTE — H&P ADULT - NSHPREVIEWOFSYSTEMS_GEN_ALL_CORE
REVIEW OF SYSTEMS:  CONSTITUTIONAL: No fever/chills.  HENMT: No HA, lightheadedness/dizziness  RESPIRATORY: No cough, wheezing, hemoptysis; No shortness of breath.  CARDIOVASCULAR: No chest pain, palpitations.  GASTROINTESTINAL: No abdominal or epigastric pain. No nausea or vomiting; No diarrhea or constipation.  GENITOURINARY: No dysuria, changes in frequency, hematuria, or incontinence  NEUROLOGICAL: Baseline strength. Sensation intact bilaterally.  SKIN: No itching, rashes  MUSCULOSKELETAL: No joint pain or swelling; No muscle, back, or extremity pain REVIEW OF SYSTEMS:  CONSTITUTIONAL: Fatigue present. No fever/chills.  HENMT: No HA, no lightheadedness  RESPIRATORY: No cough, wheezing, hemoptysis; No shortness of breath.  CARDIOVASCULAR: No chest pain, palpitations.  GASTROINTESTINAL: Chronic diarrhea present, No abdominal or epigastric pain. No nausea or vomiting; No constipation.  GENITOURINARY: No dysuria, changes in frequency, hematuria, or incontinence  NEUROLOGICAL: Baseline strength. Sensation intact bilaterally.  MUSCULOSKELETAL: Right anterior thigh pain. No joint pain or swelling; No muscle or back pain.

## 2022-10-25 NOTE — PHYSICAL THERAPY INITIAL EVALUATION ADULT - PERTINENT HX OF CURRENT PROBLEM, REHAB EVAL
77-year-old female past medical history anxiety coronary artery disease hypertension GERD type 2 diabetes mitral valve replacement presents to the ER with complaints of frequent falls.  Patient reports about a week ago had cloudy urine and was diagnosed with a UTI.

## 2022-10-25 NOTE — ED PROVIDER NOTE - CLINICAL SUMMARY MEDICAL DECISION MAKING FREE TEXT BOX
77-year-old female with history of CAD, hypertension, GERD, hyperlipidemia, osteoarthritis, type 2 diabetes presents with frequent falls over past week.  Patient's been having right-sided leg weakness as well.  Patient with recent UTI, had been given antibiotic but discontinued due to diarrhea.  No chest pain or shortness of breath.  Patient states no acute pain in the hip except with fully flexing.  No numbness or tingling.  No upper extremity weakness.  No speech changes.  No acute headaches.  No neck pain.  No other aggravating or alleviating factors.  Patient is unclear if the weakness started prior to patient having the falls.  No other acute complaints at this time.  Patient is vaccinated for COVID.  No known exposures.  Exam: Nontoxic, well-appearing.  CTA BL, no W/R/R.  Full range of motion right hip with no pain with passive movement.  Patient with some weakness to right leg.  Normal sensation.  Normal left lower extremity.  Normal upper extremities bilaterally.  No facial droop.  No other acute neurologic findings.  Normal cardiac exam.  Abdomen soft, nontender, nondistended.  No C/C/E.  No other acute findings on exam.  NIH stroke scale equals 1.  Frequent falls with right-sided leg weakness.  Check labs, CT head, CT angio head, CT LS spine, TBA.

## 2022-10-25 NOTE — PHYSICAL THERAPY INITIAL EVALUATION ADULT - ADDITIONAL COMMENTS
Patient lives in private home, +SUSANA then resides on main floor.  Patient was independent in all ADLs and ambulated independently without device up until recently and is now using RW secondary series of falls recently

## 2022-10-25 NOTE — H&P ADULT - ASSESSMENT
77 F w/ PMH CAD, HTN, anxiety, GERD, DM Type II, IBS presents w/ falls x1 week w/ recent treatment of UTI found to have leukocytosis to 16k and hypoglycemic to 55, admitted for fall workup/dispo to [facility]. 77 F w/ PMH CAD, HTN, anxiety, GERD, DM Type II, IBS presents w/ falls x1 week w/ recent treatment of UTI. Admitted with frequent falls, sepsis 2/2 uti.

## 2022-10-25 NOTE — H&P ADULT - PROBLEM SELECTOR PLAN 4
Well controlled, on/not on on home insulin.   -A1C:  -Basal: U. Bolus U  -Low ISS  -Regular finger sticks  -Consistent carb diet  -Hypoglycemic protocol. Well controlled, not on home insulin,   -AM/ A1C:  -Basal: U. Bolus U  -Low ISS  -Regular finger sticks  -CC/Dash/TLC diet  -Hypoglycemic protocol. Pt brought home medicine with her; however it appears she has missed some bottle from home and some doses may be changed. Medication list daughter/ brought may be outdated. Unable to reach pharmacies [facility]. Please f/u in AM On home metformin, trulicity and Jardiance. Not on home insulin.  -Hypoglycemic to 55 on admission in the setting of decreased PO intake  -AM/ A1C  -Low ISS, reevaluate need for insulin in AM  -Regular finger sticks  -CC/Dash/TLC diet  -Hypoglycemic protocol.

## 2022-10-25 NOTE — H&P ADULT - HISTORY OF PRESENT ILLNESS
77F CAD, HTN, HLD, GERD, DM 2, anxiety, hemorrhoids presenting to the ED with complaints of falls.     77-year-old female past medical history anxiety coronary artery disease hypertension GERD type 2 diabetes mitral valve replacement presents to the ER with complaints of frequent falls.  Patient reports about a week ago had cloudy urine and was diagnosed with a UTI.  Started an antibiotic that made her have diarrhea so she stopped.  The antibiotic was changed to Bactrim however patient did not take it today as she feels she is not getting better.  Patient has suffered multiple falls this week.  Reports right leg weakness x1 week which was prior to falls.  Prior to this was able to ambulate independently but has been falling due to weakness of the right leg.  Now complains of pain to the right anterior thigh.  Denies fever chills nausea vomiting diarrhea.  No other weakness numbness or tingling.  Denies neck pain and back pain. No saddle anesthesia    IN THE ED:  Temp 97.9F, , /63, RR 18, 96 SpO2 on room air  Labs significant for: WBC 16k, hb 11, plt 83, glucose 55, lactate 1.2  Imaging  Xray chest, hip/pelvis, femur: No fracture, Status post right hip and right knee replacement. Surgical hardware intact. Degenerative changes as above. No acute pulmonary disease.  CT Head, neck, spine:  No vertebral fracture is recognized. Lumbar degenerative disc disease results in low-grade central canal stenosis at L3-L4 and L4-L5 and intermediate grade foraminal stenosis  greatest at L4-L5 and L5-S1 on each side. Sacroiliac osteoarthritis, right greater than left  EKG:  Received in ED:  Consults: PT Pt had poor medical literacy; unsure about her PMHx and medications.    77 F w/ PMH CAD, HTN, anxiety, GERD, DM Type II, IBS presents w/ falls x1 week. Pt saw her Urologist 1 week ago for urinary frequency and cloudy urine. States she was put on an abx that caused her to have diarrhea and fatigue. Pt was switched to Bactrim a few days ago (?), she is unsure if she has taken the abx today. Additionally, she has had multiple falls starting soon after she was diagnosed with UTI, without any preceding symptoms including dizziness, palpitations, LOC, or seizure like symptoms. Pt states during her falls, her legs feel like they "just give out". Pt does endorse chronic dizziness that usually occurs after sitting up in bed, however states this is unrelated to her falls. Pt also complaining of a strictly localized pain to her right thigh w/ movement that began after her falls. Denies taking otc medications. Pt states she has been eating and drinking less than normal this past week as well due to feeling unwell.  Denies fevers, headaches, chest pain, palpitations, blurry vision, abd pain, rashes, n/v/c, saddle anesthesia, neck pain, back pain, numbness and tingling.  IN THE ED:  Temp 97.9F, , /63, RR 18, 96 SpO2 on room air  Labs significant for: WBC 16k, hb 11, plt 83, glucose 55, lactate 1.2  Imaging  Xray chest, hip/pelvis, femur: No fracture, Status post right hip and right knee replacement. Surgical hardware intact. Degenerative changes as above. No acute pulmonary disease.  CT Head, neck, spine:  No vertebral fracture is recognized. Lumbar degenerative disc disease results in low-grade central canal stenosis at L3-L4 and L4-L5 and intermediate grade foraminal stenosis  greatest at L4-L5 and L5-S1 on each side. Sacroiliac osteoarthritis, right greater than left  EKG:  Received in ED: 1L NS  Consults: PT Pt had poor medical literacy; unsure about her PMHx and medications.    77 F w/ PMH CAD, HTN, anxiety, GERD, DM Type II, IBS presents w/ falls x1 week. Pt saw her Urologist 1 week ago for urinary frequency and cloudy urine. States she was put on an abx that caused her to have diarrhea and fatigue. Pt was switched to Bactrim a few days ago (?), she is unsure if she has taken the abx today. Additionally, she has had multiple falls starting soon after she was diagnosed with UTI, without any preceding symptoms including dizziness, palpitations, LOC, or seizure like symptoms. Pt states during her falls, her legs feel like they "just give out". Pt does endorse chronic dizziness that usually occurs after sitting up in bed, however states this is unrelated to her falls. Pt also complaining of a strictly localized pain to her right thigh w/ movement that began after her falls. Denies taking otc medications. Pt states she has been eating and drinking less than normal this past week as well due to feeling unwell.  Denies fevers, headaches, chest pain, palpitations, blurry vision, abd pain, rashes, n/v/c, saddle anesthesia, neck pain, back pain, numbness and tingling.  IN THE ED:  Temp 97.9F, , /63, RR 18, 96 SpO2 on room air  Labs significant for: WBC 16k, hb 11, plt 83, glucose 55, lactate 1.2  Imaging  Xray chest, hip/pelvis, femur: No fracture, Status post right hip and right knee replacement. Surgical hardware intact. Degenerative changes as above. No acute pulmonary disease.  CT Head, neck, spine:  No vertebral fracture is recognized. Lumbar degenerative disc disease results in low-grade central canal stenosis at L3-L4 and L4-L5 and intermediate grade foraminal stenosis  greatest at L4-L5 and L5-S1 on each side. Sacroiliac osteoarthritis, right greater than left  Received in ED: 1L NS  Consults: PT Pt had poor medical literacy; unsure about her PMHx and medications.    77 F w/ PMH CAD, HTN, anxiety, GERD, DM Type II, IBS presents w/ falls x1 week. Pt saw her Urologist 1 week ago for urinary frequency and cloudy urine. States she was put on an abx that caused her to have diarrhea and fatigue. Pt was switched to Bactrim a few days ago, she is unsure if she has taken the abx today. Additionally, she has had multiple falls starting soon after she was diagnosed with UTI, without any preceding symptoms including dizziness, palpitations, LOC, or seizure like symptoms. Pt states during her falls, her legs feel like they "just give out". Pt does endorse chronic dizziness that usually occurs after sitting up in bed, however states this is unrelated to her falls. Pt also complaining of a strictly localized pain to her right thigh w/ movement that began after her falls. Denies taking otc medications. Pt states she has been eating and drinking less than normal this past week as well due to feeling unwell.  Denies fevers, headaches, chest pain, palpitations, blurry vision, abd pain, rashes, n/v/c, saddle anesthesia, neck pain, back pain, numbness and tingling.  IN THE ED:  Temp 97.9F, , /63, RR 18, 96 SpO2 on room air  Labs significant for: WBC 16k, hb 11, plt 83, glucose 55, lactate 1.2  Imaging  Xray chest, hip/pelvis, femur: No fracture, Status post right hip and right knee replacement. Surgical hardware intact. Degenerative changes as above. No acute pulmonary disease.  CT Head, neck, spine:  No vertebral fracture is recognized. Lumbar degenerative disc disease results in low-grade central canal stenosis at L3-L4 and L4-L5 and intermediate grade foraminal stenosis  greatest at L4-L5 and L5-S1 on each side. Sacroiliac osteoarthritis, right greater than left  Received in ED: 1L NS  Consults: PT

## 2022-10-26 LAB
A1C WITH ESTIMATED AVERAGE GLUCOSE RESULT: 6.7 % — HIGH (ref 4–5.6)
ANION GAP SERPL CALC-SCNC: 8 MMOL/L — SIGNIFICANT CHANGE UP (ref 5–17)
BASOPHILS # BLD AUTO: 0.12 K/UL — SIGNIFICANT CHANGE UP (ref 0–0.2)
BASOPHILS NFR BLD AUTO: 1.1 % — SIGNIFICANT CHANGE UP (ref 0–2)
BUN SERPL-MCNC: 11 MG/DL — SIGNIFICANT CHANGE UP (ref 7–23)
CALCIUM SERPL-MCNC: 8.6 MG/DL — SIGNIFICANT CHANGE UP (ref 8.5–10.1)
CHLORIDE SERPL-SCNC: 108 MMOL/L — SIGNIFICANT CHANGE UP (ref 96–108)
CHOLEST SERPL-MCNC: 49 MG/DL — SIGNIFICANT CHANGE UP
CO2 SERPL-SCNC: 24 MMOL/L — SIGNIFICANT CHANGE UP (ref 22–31)
CREAT SERPL-MCNC: 0.38 MG/DL — LOW (ref 0.5–1.3)
EGFR: 104 ML/MIN/1.73M2 — SIGNIFICANT CHANGE UP
EOSINOPHIL # BLD AUTO: 0.07 K/UL — SIGNIFICANT CHANGE UP (ref 0–0.5)
EOSINOPHIL NFR BLD AUTO: 0.6 % — SIGNIFICANT CHANGE UP (ref 0–6)
ESTIMATED AVERAGE GLUCOSE: 146 MG/DL — HIGH (ref 68–114)
GLUCOSE SERPL-MCNC: 56 MG/DL — LOW (ref 70–99)
HCT VFR BLD CALC: 28.7 % — LOW (ref 34.5–45)
HDLC SERPL-MCNC: 5 MG/DL — LOW
HGB BLD-MCNC: 9.4 G/DL — LOW (ref 11.5–15.5)
IMM GRANULOCYTES NFR BLD AUTO: 18.4 % — HIGH (ref 0–0.9)
LIPID PNL WITH DIRECT LDL SERPL: SIGNIFICANT CHANGE UP MG/DL
LYMPHOCYTES # BLD AUTO: 1.91 K/UL — SIGNIFICANT CHANGE UP (ref 1–3.3)
LYMPHOCYTES # BLD AUTO: 17.7 % — SIGNIFICANT CHANGE UP (ref 13–44)
MCHC RBC-ENTMCNC: 28.7 PG — SIGNIFICANT CHANGE UP (ref 27–34)
MCHC RBC-ENTMCNC: 32.8 GM/DL — SIGNIFICANT CHANGE UP (ref 32–36)
MCV RBC AUTO: 87.5 FL — SIGNIFICANT CHANGE UP (ref 80–100)
MONOCYTES # BLD AUTO: 1.43 K/UL — HIGH (ref 0–0.9)
MONOCYTES NFR BLD AUTO: 13.3 % — SIGNIFICANT CHANGE UP (ref 2–14)
NEUTROPHILS # BLD AUTO: 5.28 K/UL — SIGNIFICANT CHANGE UP (ref 1.8–7.4)
NEUTROPHILS NFR BLD AUTO: 48.9 % — SIGNIFICANT CHANGE UP (ref 43–77)
NON HDL CHOLESTEROL: 46 MG/DL — SIGNIFICANT CHANGE UP
NRBC # BLD: 0 /100 WBCS — SIGNIFICANT CHANGE UP (ref 0–0)
PLATELET # BLD AUTO: 84 K/UL — LOW (ref 150–400)
POTASSIUM SERPL-MCNC: 3.8 MMOL/L — SIGNIFICANT CHANGE UP (ref 3.5–5.3)
POTASSIUM SERPL-SCNC: 3.8 MMOL/L — SIGNIFICANT CHANGE UP (ref 3.5–5.3)
RBC # BLD: 3.28 M/UL — LOW (ref 3.8–5.2)
RBC # FLD: 16.6 % — HIGH (ref 10.3–14.5)
SODIUM SERPL-SCNC: 140 MMOL/L — SIGNIFICANT CHANGE UP (ref 135–145)
TRIGL SERPL-MCNC: 305 MG/DL — HIGH
WBC # BLD: 10.79 K/UL — HIGH (ref 3.8–10.5)
WBC # FLD AUTO: 10.79 K/UL — HIGH (ref 3.8–10.5)

## 2022-10-26 PROCEDURE — 99233 SBSQ HOSP IP/OBS HIGH 50: CPT

## 2022-10-26 RX ADMIN — Medication 5 MILLIGRAM(S): at 21:34

## 2022-10-26 RX ADMIN — ATORVASTATIN CALCIUM 40 MILLIGRAM(S): 80 TABLET, FILM COATED ORAL at 21:34

## 2022-10-26 RX ADMIN — Medication 40 MILLIGRAM(S): at 05:30

## 2022-10-26 RX ADMIN — Medication 145 MILLIGRAM(S): at 17:36

## 2022-10-26 RX ADMIN — Medication 10 MILLIGRAM(S): at 18:06

## 2022-10-26 RX ADMIN — PANTOPRAZOLE SODIUM 40 MILLIGRAM(S): 20 TABLET, DELAYED RELEASE ORAL at 05:30

## 2022-10-26 RX ADMIN — CEFTRIAXONE 100 MILLIGRAM(S): 500 INJECTION, POWDER, FOR SOLUTION INTRAMUSCULAR; INTRAVENOUS at 21:34

## 2022-10-26 RX ADMIN — Medication 20 MILLIGRAM(S): at 17:36

## 2022-10-26 RX ADMIN — Medication 1: at 14:12

## 2022-10-26 RX ADMIN — ENOXAPARIN SODIUM 40 MILLIGRAM(S): 100 INJECTION SUBCUTANEOUS at 22:03

## 2022-10-26 RX ADMIN — Medication 0: at 18:06

## 2022-10-26 RX ADMIN — Medication 150 MILLIGRAM(S): at 05:29

## 2022-10-26 NOTE — PROGRESS NOTE ADULT - ASSESSMENT
77 F w/ PMH CAD, HTN, anxiety, GERD, DM Type II, IBS presents w/ falls x1 week w/ recent treatment of UTI. Admitted with frequent falls, sepsis 2/2 uti.

## 2022-10-26 NOTE — PATIENT PROFILE ADULT - FALL HARM RISK - RISK INTERVENTIONS

## 2022-10-26 NOTE — CONSULT NOTE ADULT - SUBJECTIVE AND OBJECTIVE BOX
HPI:  77 F w/ PMH CAD, HTN, anxiety, GERD, DM Type II, IBS presented to the hospital with cc of urinary frequency over past week Was seen by   her urologist and was give antibiotics for UTI. However she cont to have urinary symptoms. She denies fever chills n/v/d CP SOB. UA with no pyuria. Found to have AUR and nguyen placed. >1000cc drained. There is a concern for UTI.    Infectious Disease consult was called to evaluate pt and for antibiotic management.      Past Medical & Surgical Hx:  PAST MEDICAL & SURGICAL HISTORY:  Essential hypertension  Anxiety  Environmental allergies  Gastroesophageal reflux disease without esophagitis  H/O: pneumonia  treated in 2016 as an outpatient for pneumonia  OAB (overactive bladder)  Osteopenia  Irritable bowel  COVID-19 vaccine series completed  pfizer, completed 21  Type 2 diabetes mellitus  Osteoarthritis  Hyperlipidemia  Coronary artery disease  Urinary frequency  Mixed stress and urge urinary incontinence  Insomnia  difficulty falling asleep  Diarrhea  increased in frequency recently  Perineal rash  History of vertigo  last episode 2020  Leg swelling  Atypical hyperplasia of right breast  Hearing loss  left  Obesity, Class I, BMI 30-34.9  H/O cataract extraction    History of right knee joint replacement    H/O lumpectomy  right breast - benign  and  atypical cells  Status post double vessel coronary artery bypass    H/O mitral valve repair    History of open reduction and internal fixation (ORIF) procedure  right elbow       Social History--  EtOH: denies   Tobacco: denies   Drug Use: denies    Travel/Environmental/Occupational History:  *** inserth T/E/O Hx ***    FAMILY HISTORY:  Congestive heart failure (Father)    Family history of breast cancer in mother (Mother)  grandmother and aunt    FH: type 2 diabetes (Father, Sibling)    Family history of osteoarthritis (Mother)      Allergies  amoxicillin (Pruritus)  erythromycin (Pruritus)    Intolerances  NONE      Home Medications:  Astelin 137 mcg/inh nasal spray: 2 spray(s) nasal 2 times a day (25 Oct 2022 21:06)  diazePAM 5 mg oral tablet: 1 tab(s) orally once a day (at bedtime) (25 Oct 2022 21:06)  empagliflozin 25 mg oral tablet: 1 tab(s) orally once a day (in the morning) (25 Oct 2022 21:06)  fenofibrate 160 mg oral tablet: 1 tab(s) orally once a day (25 Oct 2022 21:06)  Flonase 50 mcg/inh nasal spray: 1 spray(s) nasal 2 times a day (25 Oct 2022 21:06)  furosemide 40 mg oral tablet: 1 tab(s) orally once a day (25 Oct 2022 21:06)  glimepiride 4 mg oral tablet: 8 milligram(s) orally once a day (25 Oct 2022 21:06)  meclizine 12.5 mg oral tablet: orally 1 to 4 times a day, As Needed (25 Oct 2022 21:06)  metoprolol succinate 50 mg oral tablet, extended release: 3 tab(s) orally once a day (25 Oct 2022 21:06)  Multiple Vitamins oral tablet: 1 tab(s) orally once a day (25 Oct 2022 20:55)  omeprazole 20 mg oral delayed release capsule: 1 cap(s) orally once a day (25 Oct 2022 21:06)  oxybutynin 10 mg/24 hr oral tablet, extended release: 1 tab(s) orally once a day (25 Oct 2022 21:06)  Trulicity Pen 1.5 mg/0.5 mL subcutaneous solution: 1 dose(s) subcutaneous once a week  Monday (25 Oct 2022 21:06)      Current Inpatient Medications :    ANTIBIOTICS:   cefTRIAXone   IVPB 1000 milliGRAM(s) IV Intermittent every 24 hours      OTHER RELEVANT MEDICATIONS :  acetaminophen     Tablet .. 650 milliGRAM(s) Oral every 6 hours PRN  aluminum hydroxide/magnesium hydroxide/simethicone Suspension 30 milliLiter(s) Oral every 4 hours PRN  atorvastatin 40 milliGRAM(s) Oral at bedtime  dextrose 5%. 1000 milliLiter(s) IV Continuous <Continuous>  dextrose 5%. 1000 milliLiter(s) IV Continuous <Continuous>  dextrose 50% Injectable 25 Gram(s) IV Push once  dextrose 50% Injectable 12.5 Gram(s) IV Push once  dextrose 50% Injectable 25 Gram(s) IV Push once  dextrose Oral Gel 15 Gram(s) Oral once PRN  diazepam    Tablet 5 milliGRAM(s) Oral at bedtime  enoxaparin Injectable 40 milliGRAM(s) SubCutaneous every 24 hours  fenofibrate Tablet 145 milliGRAM(s) Oral daily  FLUoxetine 20 milliGRAM(s) Oral daily  furosemide    Tablet 40 milliGRAM(s) Oral daily  glucagon  Injectable 1 milliGRAM(s) IntraMuscular once  insulin lispro (ADMELOG) corrective regimen sliding scale   SubCutaneous three times a day before meals  insulin lispro (ADMELOG) corrective regimen sliding scale   SubCutaneous at bedtime  melatonin 3 milliGRAM(s) Oral at bedtime PRN  metoprolol succinate  milliGRAM(s) Oral daily  ondansetron Injectable 4 milliGRAM(s) IV Push every 8 hours PRN  oxybutynin XL 10 milliGRAM(s) Oral daily  pantoprazole    Tablet 40 milliGRAM(s) Oral before breakfast    ROS:  CONSTITUTIONAL:  Negative fever or chills  EYES:  Negative  blurry vision or double vision  CARDIOVASCULAR:  Negative for chest pain or palpitations  RESPIRATORY:  Negative for cough, wheezing, or SOB   GASTROINTESTINAL:  Negative for nausea, vomiting, diarrhea, constipation, or abdominal pain  GENITOURINARY:  + frequency, urgency , no dysuria or hematuria   NEUROLOGIC:  No headache, confusion, dizziness, lightheadedness  All other systems were reviewed and are negative        I&O's Detail    25 Oct 2022 07:01  -  26 Oct 2022 07:00  --------------------------------------------------------  IN:  Total IN: 0 mL    OUT:    Indwelling Catheter - Urethral (mL): 1800 mL  Total OUT: 1800 mL    Total NET: -1800 mL      Physical Exam:  Vital Signs Last 24 Hrs  T(C): 36.6 (26 Oct 2022 07:51), Max: 36.8 (25 Oct 2022 23:51)  T(F): 97.8 (26 Oct 2022 07:51), Max: 98.2 (25 Oct 2022 23:51)  HR: 70 (26 Oct 2022 07:51) (70 - 100)  BP: 119/76 (26 Oct 2022 07:51) (109/63 - 155/70)  RR: 17 (26 Oct 2022 07:51) (16 - 18)  SpO2: 96% (26 Oct 2022 07:51) (96% - 98%)    Parameters below as of 26 Oct 2022 07:51  Patient On (Oxygen Delivery Method): room air      Height (cm): 162.6 (10-25 @ 14:46)  Weight (kg): 81.6 (10-25 @ 14:46)  BMI (kg/m2): 30.9 (10-25 @ 14:46)  BSA (m2): 1.87 (10-25 @ 14:46)    General: no acute distress  Neck: supple, trachea midline  Lungs: clear, no wheeze/rhonchi  Cardiovascular: regular rate and rhythm, S1 S2  Abdomen: soft, nontender, ND, bowel sounds normal  Neurological:  alert and oriented x3  Skin: no rash  Extremities: + edema    Labs:                         9.4    10.79 )-----------( 84       ( 26 Oct 2022 05:35 )             28.7   10-26    140  |  108  |  11  ----------------------------<  56<L>  3.8   |  24  |  0.38<L>    Ca    8.6      26 Oct 2022 05:35  Mg     2.0     10-25    TPro  8.0  /  Alb  3.5  /  TBili  1.0  /  DBili  x   /  AST  38<H>  /  ALT  33  /  AlkPhos  37<L>  10-25    Urinalysis Basic - ( 25 Oct 2022 23:40 )    Color: Yellow / Appearance: Clear / S.010 / pH: x  Gluc: x / Ketone: Trace  / Bili: Negative / Urobili: Negative   Blood: x / Protein: Negative / Nitrite: Negative   Leuk Esterase: Negative / RBC: x / WBC x   Sq Epi: x / Non Sq Epi: x / Bacteria: x      RECENT CULTURES:          RADIOLOGY & ADDITIONAL STUDIES:    ACC: [id number] EXAM:  XR CHEST PORTABLE IMMED 1V                        ACC: [id number] EXAM:  XR HIPS BI WITH PELV 3-4V                        ACC: [id number] EXAM:  XR FEMUR 2 VIEWS RT                          PROCEDURE DATE:  10/25/2022          INTERPRETATION:  XR FEMUR 2 VIEWS RIGHT, XR HIPS WITH PELVIS 3 OR 4   VIEWS, XR CHEST IMMEDIATE    Clinical History: Difficulty ambulating    AP pelvis and 2 views of the bilateral hips. 2 views of the right femur.      FINDINGS:    There is no fracture, dislocation, soft tissue swelling or joint effusion.  Status post total right hip replacement. Status post total right knee   replacement. Hardware intact.  Moderate degenerative changes and narrowing of the left hip.  Degenerative changes of the visualized lower lumbar spine, pubic   symphysis and sacroiliac joints.  Mild bony overgrowth of the proximal left femur just inferior to the   greater trochanter. This is likely degenerative.  Atherosclerotic changes.  No radiopaque foreign body.      APchest    Comparison 2021    FINDINGS: Status post median sternotomy, CABG and mitral valve   replacement. Heart size, mediastinum, hilum and aorta are within normal   limits. The lungs are clear. The trachea is midline. The bony structures   are intact. Overlying metallic artifacts..      IMPRESSION:  1.  No fracture.  2.  Status post right hip and right knee replacement. Surgical hardware   intact.  3.  Degenerative changes as above.  4.  No acute pulmonary disease.    Assessment :   77 F w/ PMH CAD, HTN, anxiety, GERD, DM Type II, IBS presented to the hospital with cc of urinary frequency  UA with no pyuria. Found to have AUR and nguyen placed. >1000cc drained.    Doubt UTI symptoms likely sec AUR  Though pt has been on antibiotic    Plan :   Cont empiric Rocephin  If cultures Neg dc antibiotics  Trend temps and cbc  Nguyen per primary care/urology  Pulm toileting  Increase activity    Continue with present regiment .  Approptiate use of antibiotics and adverse effects reviewed.      I have discussed the above plan of care with patient/ in detail. They expressed understanding of the treatment plan . Risks, benefits and alternatives discussed in detail. I have asked if they have any questions or concerns and appropriately addressed them to the best of my ability .      > 45 minutes spent in direct patient care reviewing  the notes, lab data/ imaging , discussion with multidisciplinary team. All questions were addressed and answered to the best of my capacity .    Thank you for allowing me to participate in the care of your patient .      Lexii Lazcano MD  Infectious Disease  [phone]

## 2022-10-26 NOTE — CHART NOTE - NSCHARTNOTEFT_GEN_A_CORE
Patient found to be in urinary retention. >1000cc drained with cath placement. Will leave nguyen in place at this time.

## 2022-10-26 NOTE — ED ADULT NURSE REASSESSMENT NOTE - NSIMPLEMENTINTERV_GEN_ALL_ED
Implemented All Fall Risk Interventions:  [place] to call system. Call bell, personal items and telephone within reach. Instruct patient to call for assistance. Room bathroom lighting operational. Non-slip footwear when patient is off stretcher. Physically safe environment: no spills, clutter or unnecessary equipment. Stretcher in lowest position, wheels locked, appropriate side rails in place. Provide visual cue, wrist band, yellow gown, etc. Monitor gait and stability. Monitor for mental status changes and reorient to person, place, and time. Review medications for side effects contributing to fall risk. Reinforce activity limits and safety measures with patient and family.

## 2022-10-26 NOTE — PROGRESS NOTE ADULT - NSPROGADDITIONALINFOA_GEN_ALL_CORE
medically acute  followup final cultures  NEEDS [facility] for frequent falls medically acute  followup final cultures  TOV  NEEDS [facility] for frequent falls

## 2022-10-26 NOTE — ED ADULT NURSE REASSESSMENT NOTE - NS ED NURSE REASSESS COMMENT FT1
Pt received in bed alert and oriented and resting in bed alert and resting in bed. Pt denies any pain or discomfort as of this time. Pt admitted and awaiting bed placement. Nursing care ongoing and safety maintained.

## 2022-10-27 DIAGNOSIS — R60.0 LOCALIZED EDEMA: ICD-10-CM

## 2022-10-27 LAB
ANION GAP SERPL CALC-SCNC: 7 MMOL/L — SIGNIFICANT CHANGE UP (ref 5–17)
BASOPHILS # BLD AUTO: 0.24 K/UL — HIGH (ref 0–0.2)
BASOPHILS NFR BLD AUTO: 1.8 % — SIGNIFICANT CHANGE UP (ref 0–2)
BUN SERPL-MCNC: 15 MG/DL — SIGNIFICANT CHANGE UP (ref 7–23)
CALCIUM SERPL-MCNC: 9.2 MG/DL — SIGNIFICANT CHANGE UP (ref 8.5–10.1)
CHLORIDE SERPL-SCNC: 104 MMOL/L — SIGNIFICANT CHANGE UP (ref 96–108)
CO2 SERPL-SCNC: 29 MMOL/L — SIGNIFICANT CHANGE UP (ref 22–31)
CREAT SERPL-MCNC: 0.52 MG/DL — SIGNIFICANT CHANGE UP (ref 0.5–1.3)
CULTURE RESULTS: SIGNIFICANT CHANGE UP
EGFR: 96 ML/MIN/1.73M2 — SIGNIFICANT CHANGE UP
EOSINOPHIL # BLD AUTO: 0.08 K/UL — SIGNIFICANT CHANGE UP (ref 0–0.5)
EOSINOPHIL NFR BLD AUTO: 0.6 % — SIGNIFICANT CHANGE UP (ref 0–6)
GLUCOSE SERPL-MCNC: 146 MG/DL — HIGH (ref 70–99)
HCT VFR BLD CALC: 32.7 % — LOW (ref 34.5–45)
HGB BLD-MCNC: 10.7 G/DL — LOW (ref 11.5–15.5)
IMM GRANULOCYTES NFR BLD AUTO: 18.6 % — HIGH (ref 0–0.9)
LYMPHOCYTES # BLD AUTO: 2.83 K/UL — SIGNIFICANT CHANGE UP (ref 1–3.3)
LYMPHOCYTES # BLD AUTO: 21.7 % — SIGNIFICANT CHANGE UP (ref 13–44)
MAGNESIUM SERPL-MCNC: 1.9 MG/DL — SIGNIFICANT CHANGE UP (ref 1.6–2.6)
MCHC RBC-ENTMCNC: 28.5 PG — SIGNIFICANT CHANGE UP (ref 27–34)
MCHC RBC-ENTMCNC: 32.7 GM/DL — SIGNIFICANT CHANGE UP (ref 32–36)
MCV RBC AUTO: 87 FL — SIGNIFICANT CHANGE UP (ref 80–100)
MONOCYTES # BLD AUTO: 1.65 K/UL — HIGH (ref 0–0.9)
MONOCYTES NFR BLD AUTO: 12.6 % — SIGNIFICANT CHANGE UP (ref 2–14)
NEUTROPHILS # BLD AUTO: 5.84 K/UL — SIGNIFICANT CHANGE UP (ref 1.8–7.4)
NEUTROPHILS NFR BLD AUTO: 44.7 % — SIGNIFICANT CHANGE UP (ref 43–77)
NRBC # BLD: 0 /100 WBCS — SIGNIFICANT CHANGE UP (ref 0–0)
PHOSPHATE SERPL-MCNC: 3 MG/DL — SIGNIFICANT CHANGE UP (ref 2.5–4.5)
PLATELET # BLD AUTO: 122 K/UL — LOW (ref 150–400)
POTASSIUM SERPL-MCNC: 3.8 MMOL/L — SIGNIFICANT CHANGE UP (ref 3.5–5.3)
POTASSIUM SERPL-SCNC: 3.8 MMOL/L — SIGNIFICANT CHANGE UP (ref 3.5–5.3)
RBC # BLD: 3.76 M/UL — LOW (ref 3.8–5.2)
RBC # FLD: 16.4 % — HIGH (ref 10.3–14.5)
SARS-COV-2 RNA SPEC QL NAA+PROBE: SIGNIFICANT CHANGE UP
SODIUM SERPL-SCNC: 140 MMOL/L — SIGNIFICANT CHANGE UP (ref 135–145)
SPECIMEN SOURCE: SIGNIFICANT CHANGE UP
WBC # BLD: 13.07 K/UL — HIGH (ref 3.8–10.5)
WBC # FLD AUTO: 13.07 K/UL — HIGH (ref 3.8–10.5)

## 2022-10-27 PROCEDURE — 71045 X-RAY EXAM CHEST 1 VIEW: CPT | Mod: 26

## 2022-10-27 PROCEDURE — 99233 SBSQ HOSP IP/OBS HIGH 50: CPT

## 2022-10-27 RX ORDER — FUROSEMIDE 40 MG
40 TABLET ORAL ONCE
Refills: 0 | Status: COMPLETED | OUTPATIENT
Start: 2022-10-27 | End: 2022-10-27

## 2022-10-27 RX ADMIN — Medication 650 MILLIGRAM(S): at 23:56

## 2022-10-27 RX ADMIN — Medication 145 MILLIGRAM(S): at 11:52

## 2022-10-27 RX ADMIN — Medication 20 MILLIGRAM(S): at 11:52

## 2022-10-27 RX ADMIN — Medication 1: at 16:54

## 2022-10-27 RX ADMIN — ENOXAPARIN SODIUM 40 MILLIGRAM(S): 100 INJECTION SUBCUTANEOUS at 23:55

## 2022-10-27 RX ADMIN — Medication 5 MILLIGRAM(S): at 22:01

## 2022-10-27 RX ADMIN — PANTOPRAZOLE SODIUM 40 MILLIGRAM(S): 20 TABLET, DELAYED RELEASE ORAL at 05:32

## 2022-10-27 RX ADMIN — Medication 40 MILLIGRAM(S): at 05:33

## 2022-10-27 RX ADMIN — Medication 40 MILLIGRAM(S): at 12:06

## 2022-10-27 RX ADMIN — Medication 150 MILLIGRAM(S): at 05:32

## 2022-10-27 RX ADMIN — Medication 3 MILLIGRAM(S): at 23:56

## 2022-10-27 RX ADMIN — ATORVASTATIN CALCIUM 40 MILLIGRAM(S): 80 TABLET, FILM COATED ORAL at 22:01

## 2022-10-27 RX ADMIN — Medication 1: at 08:18

## 2022-10-27 RX ADMIN — Medication 10 MILLIGRAM(S): at 12:06

## 2022-10-28 DIAGNOSIS — E83.52 HYPERCALCEMIA: ICD-10-CM

## 2022-10-28 LAB
ANION GAP SERPL CALC-SCNC: 7 MMOL/L — SIGNIFICANT CHANGE UP (ref 5–17)
BASOPHILS # BLD AUTO: 0 K/UL — SIGNIFICANT CHANGE UP (ref 0–0.2)
BASOPHILS NFR BLD AUTO: 0 % — SIGNIFICANT CHANGE UP (ref 0–2)
BUN SERPL-MCNC: 24 MG/DL — HIGH (ref 7–23)
CALCIUM SERPL-MCNC: 10.2 MG/DL — HIGH (ref 8.5–10.1)
CHLORIDE SERPL-SCNC: 99 MMOL/L — SIGNIFICANT CHANGE UP (ref 96–108)
CO2 SERPL-SCNC: 32 MMOL/L — HIGH (ref 22–31)
CREAT SERPL-MCNC: 0.64 MG/DL — SIGNIFICANT CHANGE UP (ref 0.5–1.3)
EGFR: 92 ML/MIN/1.73M2 — SIGNIFICANT CHANGE UP
EOSINOPHIL # BLD AUTO: 0.33 K/UL — SIGNIFICANT CHANGE UP (ref 0–0.5)
EOSINOPHIL NFR BLD AUTO: 2 % — SIGNIFICANT CHANGE UP (ref 0–6)
GLUCOSE SERPL-MCNC: 174 MG/DL — HIGH (ref 70–99)
HCT VFR BLD CALC: 35.8 % — SIGNIFICANT CHANGE UP (ref 34.5–45)
HGB BLD-MCNC: 11.7 G/DL — SIGNIFICANT CHANGE UP (ref 11.5–15.5)
LYMPHOCYTES # BLD AUTO: 20 % — SIGNIFICANT CHANGE UP (ref 13–44)
LYMPHOCYTES # BLD AUTO: 3.31 K/UL — HIGH (ref 1–3.3)
MAGNESIUM SERPL-MCNC: 1.9 MG/DL — SIGNIFICANT CHANGE UP (ref 1.6–2.6)
MCHC RBC-ENTMCNC: 28.7 PG — SIGNIFICANT CHANGE UP (ref 27–34)
MCHC RBC-ENTMCNC: 32.7 GM/DL — SIGNIFICANT CHANGE UP (ref 32–36)
MCV RBC AUTO: 87.7 FL — SIGNIFICANT CHANGE UP (ref 80–100)
MONOCYTES # BLD AUTO: 1.16 K/UL — HIGH (ref 0–0.9)
MONOCYTES NFR BLD AUTO: 7 % — SIGNIFICANT CHANGE UP (ref 2–14)
NEUTROPHILS # BLD AUTO: 9.44 K/UL — HIGH (ref 1.8–7.4)
NEUTROPHILS NFR BLD AUTO: 47 % — SIGNIFICANT CHANGE UP (ref 43–77)
NRBC # BLD: SIGNIFICANT CHANGE UP /100 WBCS (ref 0–0)
NT-PROBNP SERPL-SCNC: 136 PG/ML — SIGNIFICANT CHANGE UP (ref 0–450)
PHOSPHATE SERPL-MCNC: 3.8 MG/DL — SIGNIFICANT CHANGE UP (ref 2.5–4.5)
PLATELET # BLD AUTO: 141 K/UL — LOW (ref 150–400)
POTASSIUM SERPL-MCNC: 4.2 MMOL/L — SIGNIFICANT CHANGE UP (ref 3.5–5.3)
POTASSIUM SERPL-SCNC: 4.2 MMOL/L — SIGNIFICANT CHANGE UP (ref 3.5–5.3)
RBC # BLD: 4.08 M/UL — SIGNIFICANT CHANGE UP (ref 3.8–5.2)
RBC # FLD: 16.6 % — HIGH (ref 10.3–14.5)
SODIUM SERPL-SCNC: 138 MMOL/L — SIGNIFICANT CHANGE UP (ref 135–145)
WBC # BLD: 16.56 K/UL — HIGH (ref 3.8–10.5)
WBC # FLD AUTO: 16.56 K/UL — HIGH (ref 3.8–10.5)

## 2022-10-28 PROCEDURE — 99233 SBSQ HOSP IP/OBS HIGH 50: CPT

## 2022-10-28 RX ADMIN — Medication 5 MILLIGRAM(S): at 23:53

## 2022-10-28 RX ADMIN — ENOXAPARIN SODIUM 40 MILLIGRAM(S): 100 INJECTION SUBCUTANEOUS at 23:54

## 2022-10-28 RX ADMIN — Medication 650 MILLIGRAM(S): at 21:24

## 2022-10-28 RX ADMIN — ATORVASTATIN CALCIUM 40 MILLIGRAM(S): 80 TABLET, FILM COATED ORAL at 21:53

## 2022-10-28 RX ADMIN — Medication 10 MILLIGRAM(S): at 11:22

## 2022-10-28 RX ADMIN — Medication 145 MILLIGRAM(S): at 11:22

## 2022-10-28 RX ADMIN — Medication 1: at 08:00

## 2022-10-28 RX ADMIN — Medication 1: at 11:47

## 2022-10-28 RX ADMIN — Medication 650 MILLIGRAM(S): at 01:50

## 2022-10-28 RX ADMIN — Medication 650 MILLIGRAM(S): at 21:57

## 2022-10-28 RX ADMIN — Medication 2: at 16:20

## 2022-10-28 RX ADMIN — Medication 650 MILLIGRAM(S): at 06:28

## 2022-10-28 RX ADMIN — PANTOPRAZOLE SODIUM 40 MILLIGRAM(S): 20 TABLET, DELAYED RELEASE ORAL at 06:32

## 2022-10-28 RX ADMIN — Medication 150 MILLIGRAM(S): at 05:37

## 2022-10-28 RX ADMIN — Medication 20 MILLIGRAM(S): at 11:22

## 2022-10-28 RX ADMIN — Medication 40 MILLIGRAM(S): at 05:38

## 2022-10-28 NOTE — PROGRESS NOTE ADULT - NS ATTEST RISK PROBLEM GEN_ALL_CORE FT
pt w/ dyspnea. check cxr. give 40mg IV lasix x1 and monitor volume status closely.
will monitor leukocytosis prior to dc to SNF due to new onset bandemia

## 2022-10-28 NOTE — PROGRESS NOTE ADULT - TIME BILLING
I personally conducted a physical examination of the patient. I personally gathered the patient's history. I edited the above listed findings which were prepared by the listed resident physician. I personally discussed the plan of care with the patient. The questions and concerns were addressed to the best of my ability. The patient is in agreement with the listed treatment plan.
as above - met w/ ID and pt's spouse at bedside to discuss indicaiotn for further clinical monitoring prior ot dc. pt/family are eager for dc planning
as above

## 2022-10-29 ENCOUNTER — TRANSCRIPTION ENCOUNTER (OUTPATIENT)
Age: 77
End: 2022-10-29

## 2022-10-29 LAB
ANION GAP SERPL CALC-SCNC: 10 MMOL/L — SIGNIFICANT CHANGE UP (ref 5–17)
BUN SERPL-MCNC: 17 MG/DL — SIGNIFICANT CHANGE UP (ref 7–23)
CALCIUM SERPL-MCNC: 9.8 MG/DL — SIGNIFICANT CHANGE UP (ref 8.5–10.1)
CHLORIDE SERPL-SCNC: 98 MMOL/L — SIGNIFICANT CHANGE UP (ref 96–108)
CO2 SERPL-SCNC: 28 MMOL/L — SIGNIFICANT CHANGE UP (ref 22–31)
CREAT SERPL-MCNC: 0.53 MG/DL — SIGNIFICANT CHANGE UP (ref 0.5–1.3)
EGFR: 96 ML/MIN/1.73M2 — SIGNIFICANT CHANGE UP
GLUCOSE SERPL-MCNC: 180 MG/DL — HIGH (ref 70–99)
HCT VFR BLD CALC: 34.5 % — SIGNIFICANT CHANGE UP (ref 34.5–45)
HGB BLD-MCNC: 11.2 G/DL — LOW (ref 11.5–15.5)
MCHC RBC-ENTMCNC: 28.5 PG — SIGNIFICANT CHANGE UP (ref 27–34)
MCHC RBC-ENTMCNC: 32.5 GM/DL — SIGNIFICANT CHANGE UP (ref 32–36)
MCV RBC AUTO: 87.8 FL — SIGNIFICANT CHANGE UP (ref 80–100)
NRBC # BLD: 0 /100 WBCS — SIGNIFICANT CHANGE UP (ref 0–0)
PLATELET # BLD AUTO: 183 K/UL — SIGNIFICANT CHANGE UP (ref 150–400)
POTASSIUM SERPL-MCNC: 4.2 MMOL/L — SIGNIFICANT CHANGE UP (ref 3.5–5.3)
POTASSIUM SERPL-SCNC: 4.2 MMOL/L — SIGNIFICANT CHANGE UP (ref 3.5–5.3)
RBC # BLD: 3.93 M/UL — SIGNIFICANT CHANGE UP (ref 3.8–5.2)
RBC # FLD: 16.2 % — HIGH (ref 10.3–14.5)
SODIUM SERPL-SCNC: 136 MMOL/L — SIGNIFICANT CHANGE UP (ref 135–145)
WBC # BLD: 18.69 K/UL — HIGH (ref 3.8–10.5)
WBC # FLD AUTO: 18.69 K/UL — HIGH (ref 3.8–10.5)

## 2022-10-29 PROCEDURE — 99233 SBSQ HOSP IP/OBS HIGH 50: CPT

## 2022-10-29 RX ORDER — FENOFIBRATE,MICRONIZED 130 MG
1 CAPSULE ORAL
Qty: 0 | Refills: 0 | DISCHARGE

## 2022-10-29 RX ORDER — FUROSEMIDE 40 MG
1 TABLET ORAL
Qty: 0 | Refills: 0 | DISCHARGE
Start: 2022-10-29

## 2022-10-29 RX ORDER — DIAZEPAM 5 MG
1 TABLET ORAL
Qty: 0 | Refills: 0 | DISCHARGE
Start: 2022-10-29

## 2022-10-29 RX ORDER — FLUOXETINE HCL 10 MG
1 CAPSULE ORAL
Qty: 0 | Refills: 0 | DISCHARGE
Start: 2022-10-29

## 2022-10-29 RX ORDER — FENOFIBRATE,MICRONIZED 130 MG
1 CAPSULE ORAL
Qty: 0 | Refills: 0 | DISCHARGE
Start: 2022-10-29

## 2022-10-29 RX ORDER — METOPROLOL TARTRATE 50 MG
3 TABLET ORAL
Qty: 0 | Refills: 0 | DISCHARGE
Start: 2022-10-29

## 2022-10-29 RX ORDER — ACETAMINOPHEN 500 MG
2 TABLET ORAL
Qty: 0 | Refills: 0 | DISCHARGE
Start: 2022-10-29

## 2022-10-29 RX ORDER — OXYBUTYNIN CHLORIDE 5 MG
1 TABLET ORAL
Qty: 0 | Refills: 0 | DISCHARGE
Start: 2022-10-29

## 2022-10-29 RX ORDER — OXYBUTYNIN CHLORIDE 5 MG
1 TABLET ORAL
Qty: 0 | Refills: 0 | DISCHARGE

## 2022-10-29 RX ORDER — DIAZEPAM 5 MG
1 TABLET ORAL
Qty: 0 | Refills: 0 | DISCHARGE

## 2022-10-29 RX ORDER — ATORVASTATIN CALCIUM 80 MG/1
1 TABLET, FILM COATED ORAL
Qty: 0 | Refills: 0 | DISCHARGE
Start: 2022-10-29

## 2022-10-29 RX ADMIN — Medication 2: at 12:11

## 2022-10-29 RX ADMIN — Medication 10 MILLIGRAM(S): at 12:11

## 2022-10-29 RX ADMIN — Medication 1: at 16:50

## 2022-10-29 RX ADMIN — ENOXAPARIN SODIUM 40 MILLIGRAM(S): 100 INJECTION SUBCUTANEOUS at 22:55

## 2022-10-29 RX ADMIN — Medication 20 MILLIGRAM(S): at 12:12

## 2022-10-29 RX ADMIN — Medication 150 MILLIGRAM(S): at 05:51

## 2022-10-29 RX ADMIN — Medication 145 MILLIGRAM(S): at 12:11

## 2022-10-29 RX ADMIN — Medication 5 MILLIGRAM(S): at 21:10

## 2022-10-29 RX ADMIN — ATORVASTATIN CALCIUM 40 MILLIGRAM(S): 80 TABLET, FILM COATED ORAL at 21:10

## 2022-10-29 RX ADMIN — Medication 40 MILLIGRAM(S): at 05:51

## 2022-10-29 RX ADMIN — Medication 1: at 07:52

## 2022-10-29 RX ADMIN — PANTOPRAZOLE SODIUM 40 MILLIGRAM(S): 20 TABLET, DELAYED RELEASE ORAL at 06:07

## 2022-10-29 NOTE — PROGRESS NOTE ADULT - PROBLEM SELECTOR PLAN 3
soft peripheral edema noted on 10/27. rales in bases. orthopnea and dyspnea on exertion reported by patient  - s/p IV lasix on 10/27  - cont home dose of 40mg po lasix q daily  - check CXR = clear lung fields  - bnp normal  - dc planning to [facility]
soft peripheral edema noted on 10/27. rales in bases. orthopnea and dyspnea on exertion reported by patient  - s/p IV lasix on 10/27  - cont home dose of 40mg po lasix q daily  - check CXR = clear lung fields  - bnp normal  - dc planning to [facility]
Pt brought home medicine with her; however it appears she has missed some bottle from home and some doses may be changed. Medication list daughter/ brought may be outdated. Unable to reach pharmacies [facility]. Please f/u in AM
Treated outpt with unknown antibiotic and TMP-SMX, on jardiance at home for DM2  -Afebrile w/ leukocytosis 16k. Meets sepsis criteria w/ WBC and HR at admission.  -s/p ceftriaxone - cultures likely sterilized by outpt abx - no further abx warranted at present  -blood cultures neg. urine culture neg  -ID recs noted and appreciated - dc'd abx this AM

## 2022-10-29 NOTE — PROGRESS NOTE ADULT - PROBLEM SELECTOR PLAN 9
Continue home PPI 20mg
Lovenox 40mg qD, for DVT prophylaxis    Dispo  -PT recommending [facility]  -Social work consult.
Continue home fluoxetine 20mg qD, diazepam 5mg qD.  - caution w/ benzo use in this pt population - on BEERS list
Continue home fluoxetine 20mg qD, diazepam 5mg qD.  - caution w/ benzo use in this pt population - on BEERS list

## 2022-10-29 NOTE — PROGRESS NOTE ADULT - PROBLEM SELECTOR PLAN 7
Continue home fluoxetine 20mg qD, diazepam 5mg qD.
Active cardiovascular meds:  furosemide    Tablet 40 milliGRAM(s) Oral daily  metoprolol succinate  milliGRAM(s) Oral daily  - will continue w/ holding parameters as indicated  - BP readings for last 24 hours: BP:  (121/72 - 144/80)
-Continue home aspirin, statin, fenofibrate.  -BP Control  -CC/Dash/TLC diet
Active cardiovascular meds:  furosemide    Tablet 40 milliGRAM(s) Oral daily  metoprolol succinate  milliGRAM(s) Oral daily  - will continue w/ holding parameters as indicated  - BP readings for last 24 hours: BP:  (121/72 - 158/89)

## 2022-10-29 NOTE — PROGRESS NOTE ADULT - REASON FOR ADMISSION
Repeated falls, [facility] ag

## 2022-10-29 NOTE — PROGRESS NOTE ADULT - ASSESSMENT
77 F w/ PMH CAD, HTN, anxiety, GERD, DM Type II, IBS presented to the hospital with cc of urinary frequency  UA with no pyuria. Found to have AUR and nguyen placed. >1000cc drained.    Doubt UTI symptoms likely sec AUR  Ucx NGTD  leukocytosis with bandemia - nontoxic.    RECOMMENDATIONS  While pt has leukocytosis and even bandemia there is no obv infectious process and pt clinically stable and interested in going home    Monitor off antibiotics    Thank you for consulting us and involving us in the management of this most interesting and challenging case.  We will follow along in the care of this patient. Please call us at [phone] or text me directly on my cell# at [phone] with any concerns.

## 2022-10-29 NOTE — DISCHARGE NOTE PROVIDER - CARE PROVIDER_API CALL
Evon Mcrae (DO)  Internal Medicine  Internal Medicine, [address] [place] [address]  Phone: [phone]  Fax: [phone]  Follow Up Time:

## 2022-10-29 NOTE — PROGRESS NOTE ADULT - PROBLEM SELECTOR PROBLEM 6
CAD (coronary artery disease)
DM2 (diabetes mellitus, type 2)
DM2 (diabetes mellitus, type 2)
HTN (hypertension)

## 2022-10-29 NOTE — PROGRESS NOTE ADULT - PROBLEM SELECTOR PLAN 2
mild, renal indices baseline, unclear etiology  - will trend calcium levels - s/p IV lasix on 10/27  - if persists, can check pth  - Ca 9.8 on 10/29
Treated outpt with unknown antibiotic and TMP-SMX, on jardiance at home for DM2  -Afebrile w/ leukocytosis 16k. Meets sepsis criteria w/ WBC and HR at admission.  -Start ceftriaxone 1g daily  -UA/UCx ordered via straight cath  -2x blood culture  -Lactate 1.2 on admission  -Monitor for signs and symptoms of infection  -ID consult
mild, renal indices baseline, unclear etiology  - will trend calcium levels - s/p IV lasix on 10/27  - if persists, can check pth
frequent falls over the last week, likely in the setting of sepsis.   -10/25 CT head neck spine: Negative for fracture or acute bleeds  -10/25 Xray chest, hip/pelvis, femur: No pneumonia or fractures  -Fall precautions  -Pt recommends [facility]

## 2022-10-29 NOTE — PROGRESS NOTE ADULT - PROBLEM SELECTOR PLAN 5
Chronic   -Continue home metoprolol 150mg qD, furosemide 40mg qD w/ hold parameters  -Monitor hemodynamics
will review med list w/ family prior to dc
On home metformin, trulicity and Jardiance. Not on home insulin.  -Hypoglycemic to 55 on admission in the setting of decreased PO intake  Last 24 hours of POC Glucose checks:   POCT Blood Glucose.: 142 mg/dL (10-27-22 @ 11:41)  POCT Blood Glucose.: 164 mg/dL (10-27-22 @ 07:45)  POCT Blood Glucose.: 123 mg/dL (10-26-22 @ 18:01)  a1C with Estimated Average Glucose Result: 6.7 % (10-26-22 @ 05:35)
will review med list w/ family prior to dc

## 2022-10-29 NOTE — PROGRESS NOTE ADULT - PROBLEM SELECTOR PLAN 1
soft peripheral edema noted on 10/27. rales in bases. orthopnea and dyspnea on exertion reported by patient  - give 40mg IV lasix x1 today  - cont home dose of 40mg po lasix q daily  - check CXR  - will check BNP in AM and trend if needed  - dc planning to [facility]
w/ new onset of bandemia  - unclear source  - pt has IBS and took imodium frequently prior to admission and she's been constipated during admission  - monitor leukocytosis w/ diff off antibiotics  - reviewed w/ ID  - 18.69 WBC (on 10/29, uptrending)
frequent falls over the last week, likely in the setting of sepsis.   -10/25 CT head neck spine: Negative for fracture or acute bleeds  -10/25 Xray chest, hip/pelvis, femur: No pneumonia or fractures  -Fall precautions  -Pt recommends [facility]
w/ new onset of bandemia  - unclear source  - pt has IBS and took imodium frequently prior to admission and she's been constipated during admission  - monitor leukocytosis w/ diff off antibiotics  - reviewed w/ ID

## 2022-10-29 NOTE — PROGRESS NOTE ADULT - PROVIDER SPECIALTY LIST ADULT
Infectious Disease
Hospitalist
Infectious Disease
Infectious Disease
Hospitalist
Hospitalist
Internal Medicine

## 2022-10-29 NOTE — PROGRESS NOTE ADULT - PROBLEM SELECTOR PLAN 11
Lovenox 40mg qD, for DVT prophylaxis    Dispo  -PT recommending [facility]  -Social work consult.
Lovenox 40mg qD, for DVT prophylaxis    Dispo  -PT recommending [facility]  -Social work consult.

## 2022-10-29 NOTE — PROGRESS NOTE ADULT - PROBLEM SELECTOR PLAN 10
Continue home PPI 20mg
Lovenox 40mg qD, for DVT prophylaxis    Dispo  -PT recommending [facility]  -Social work consult.
Continue home PPI 20mg

## 2022-10-29 NOTE — PROGRESS NOTE ADULT - PROBLEM SELECTOR PLAN 4
will review med list w/ family prior to dc
Treated outpt with unknown antibiotic and TMP-SMX, on jardiance at home for DM2  -Afebrile w/ leukocytosis 16k. Meets sepsis criteria w/ WBC and HR at admission.  -s/p ceftriaxone - cultures likely sterilized by outpt abx - no further abx warranted at present  -blood cultures neg. urine culture neg  -ID recs noted and appreciated - s/p IV abx
Treated outpt with unknown antibiotic and TMP-SMX, on jardiance at home for DM2  -Afebrile w/ leukocytosis 16k. Meets sepsis criteria w/ WBC and HR at admission.  -s/p ceftriaxone - cultures likely sterilized by outpt abx - no further abx warranted at present  -blood cultures neg. urine culture neg  -ID recs noted and appreciated - dc'd abx this AM
On home metformin, trulicity and Jardiance. Not on home insulin.  -Hypoglycemic to 55 on admission in the setting of decreased PO intake  -AM/ A1C  -Low ISS, reevaluate need for insulin in AM  -Regular finger sticks  -CC/Dash/TLC diet  -Hypoglycemic protocol.

## 2022-10-29 NOTE — PROGRESS NOTE ADULT - PROBLEM SELECTOR PLAN 8
Continue home PPI 20mg
-Continue home aspirin, statin, fenofibrate.
-Continue home aspirin, statin, fenofibrate.
Continue home fluoxetine 20mg qD, diazepam 5mg qD.

## 2022-10-29 NOTE — DISCHARGE NOTE PROVIDER - NSDCCPCAREPLAN_GEN_ALL_CORE_FT
PRINCIPAL DISCHARGE DIAGNOSIS  Diagnosis: Leukocytosis  Assessment and Plan of Treatment: You had an elevated white blood cell count, however there no obvious infectious process was found. Blood cultures and urine culture were negative. Infectious disease followed you during your stay and cleared you for discharge. You will need a repeat CBC within 1 week. Please follow up with your PCP within 1 week of discharge.      SECONDARY DISCHARGE DIAGNOSES  Diagnosis: CAD (coronary artery disease)  Assessment and Plan of Treatment: Resume home Atorvastatin    Diagnosis: DM2 (diabetes mellitus, type 2)  Assessment and Plan of Treatment: You have a known history of diabetes prior to your admission. Uncontrolled blood sugar levels can lead to kidney and heart damage, pain/numbness/paralysis in your hands and feel and increased risk of infections. Your hemoglobin A1c on this hospital admission was 6.7. Resume your home diabetes medication regimen. Follow up with your PCP, podiatrist, ophthalmologist on a regular basis.      Diagnosis: GERD (gastroesophageal reflux disease)  Assessment and Plan of Treatment: Resume home Omeprazole    Diagnosis: HTN (hypertension)  Assessment and Plan of Treatment: Resume home Metoprolol Succinate

## 2022-10-29 NOTE — DISCHARGE NOTE PROVIDER - HOSPITAL COURSE
ADMISSION H+P:    HPI:  Pt had poor medical literacy; unsure about her PMHx and medications.    77 F w/ PMH CAD, HTN, anxiety, GERD, DM Type II, IBS presents w/ falls x1 week. Pt saw her Urologist 1 week ago for urinary frequency and cloudy urine. States she was put on an abx that caused her to have diarrhea and fatigue. Pt was switched to Bactrim a few days ago, she is unsure if she has taken the abx today. Additionally, she has had multiple falls starting soon after she was diagnosed with UTI, without any preceding symptoms including dizziness, palpitations, LOC, or seizure like symptoms. Pt states during her falls, her legs feel like they "just give out". Pt does endorse chronic dizziness that usually occurs after sitting up in bed, however states this is unrelated to her falls. Pt also complaining of a strictly localized pain to her right thigh w/ movement that began after her falls. Denies taking otc medications. Pt states she has been eating and drinking less than normal this past week as well due to feeling unwell.  Denies fevers, headaches, chest pain, palpitations, blurry vision, abd pain, rashes, n/v/c, saddle anesthesia, neck pain, back pain, numbness and tingling.  IN THE ED:  Temp 97.9F, , /63, RR 18, 96 SpO2 on room air  Labs significant for: WBC 16k, hb 11, plt 83, glucose 55, lactate 1.2  Imaging  Xray chest, hip/pelvis, femur: No fracture, Status post right hip and right knee replacement. Surgical hardware intact. Degenerative changes as above. No acute pulmonary disease.  CT Head, neck, spine:  No vertebral fracture is recognized. Lumbar degenerative disc disease results in low-grade central canal stenosis at L3-L4 and L4-L5 and intermediate grade foraminal stenosis  greatest at L4-L5 and L5-S1 on each side. Sacroiliac osteoarthritis, right greater than left  Received in ED: 1L NS  Consults: PT (25 Oct 2022 19:46)      ---  HOSPITAL COURSE:     Patient was medically optimized and improved clinically throughout hospital course. Patient seen and examined on day of discharge.    Vital Signs  T(C): 36.3 (29 Oct 2022 12:25), Max: 36.4 (28 Oct 2022 20:09)  T(F): 97.3 (29 Oct 2022 12:25), Max: 97.6 (28 Oct 2022 20:09)  HR: 90 (29 Oct 2022 12:25) (75 - 90)  BP: 110/72 (29 Oct 2022 12:25) (110/72 - 158/89)  RR: 20 (29 Oct 2022 12:25) (18 - 20)  SpO2: 92% (29 Oct 2022 12:25) (92% - 97%)    Physical Exam:  General: well-developed, well-nourished, NAD  HEENT: normocephalic, atraumatic, EOMI, moist mucous membranes   Neck: supple, non-tender, no masses  Neurology: AAOx3, sensation intact  Respiratory: clear to auscultation bilaterally; no wheezes, rhonchi, or rales  CV: regular rate and rhythm, soft S1/S2, no murmurs, rubs, or gallops  Abdominal: soft, non-tender, non-distended, bowel sounds present  Extremities: no clubbing, cyanosis, or edema; palpable peripheral pulses  Musculoskeletal: no joint erythema or warmth, no joint swelling   Skin: warm, dry, normal color    Patient is medically stable for discharge to ____ with outpatient follow up.  ---  CONSULTANTS:     ---  TIME SPENT:   I, the attending physician, was physically present for the key portions of the evaluation and management (E/M) service provided. The total amount of time spent reviewing the hospital course, laboratory values, imaging findings, assessing/counseling the patient, discussing with consultant physicians, social work, nursing staff was -- minutes.     ---  FINAL DISCHARGE DIAGNOSIS LIST:  Please see last daily progress note for final discharge diagnoses   ADMISSION H+P:    HPI:  Pt had poor medical literacy; unsure about her PMHx and medications.    77 F w/ PMH CAD, HTN, anxiety, GERD, DM Type II, IBS presents w/ falls x1 week. Pt saw her Urologist 1 week ago for urinary frequency and cloudy urine. States she was put on an abx that caused her to have diarrhea and fatigue. Pt was switched to Bactrim a few days ago, she is unsure if she has taken the abx today. Additionally, she has had multiple falls starting soon after she was diagnosed with UTI, without any preceding symptoms including dizziness, palpitations, LOC, or seizure like symptoms. Pt states during her falls, her legs feel like they "just give out". Pt does endorse chronic dizziness that usually occurs after sitting up in bed, however states this is unrelated to her falls. Pt also complaining of a strictly localized pain to her right thigh w/ movement that began after her falls. Denies taking otc medications. Pt states she has been eating and drinking less than normal this past week as well due to feeling unwell.  Denies fevers, headaches, chest pain, palpitations, blurry vision, abd pain, rashes, n/v/c, saddle anesthesia, neck pain, back pain, numbness and tingling.  IN THE ED:  Temp 97.9F, , /63, RR 18, 96 SpO2 on room air  Labs significant for: WBC 16k, hb 11, plt 83, glucose 55, lactate 1.2  Imaging  Xray chest, hip/pelvis, femur: No fracture, Status post right hip and right knee replacement. Surgical hardware intact. Degenerative changes as above. No acute pulmonary disease.  CT Head, neck, spine:  No vertebral fracture is recognized. Lumbar degenerative disc disease results in low-grade central canal stenosis at L3-L4 and L4-L5 and intermediate grade foraminal stenosis  greatest at L4-L5 and L5-S1 on each side. Sacroiliac osteoarthritis, right greater than left  Received in ED: 1L NS  Consults: PT (25 Oct 2022 19:46)      ---  HOSPITAL COURSE: Patient was admitted for management of frequent falls and leukocytosis. ID (Dr. Hedrick) was consulted. Discussed with ID, patient has leukocytosis and even bandemia, however there is no obvious infectious process and patient is clinically stable for discharge. Blood cultures and urine culture were negative. Patient was treated with Ceftriaxone. Cultures likely sterilized by outpt abx. No further antibiotics were warranted at this time.     Patient was medically optimized and improved clinically throughout hospital course. Patient seen and examined on day of discharge.    Vital Signs  T(C): 36.3 (29 Oct 2022 12:25), Max: 36.4 (28 Oct 2022 20:09)  T(F): 97.3 (29 Oct 2022 12:25), Max: 97.6 (28 Oct 2022 20:09)  HR: 90 (29 Oct 2022 12:25) (75 - 90)  BP: 110/72 (29 Oct 2022 12:25) (110/72 - 158/89)  RR: 20 (29 Oct 2022 12:25) (18 - 20)  SpO2: 92% (29 Oct 2022 12:25) (92% - 97%)    Physical Exam:  GENERAL: patient appears calm, comfortable, response time somewhat slowed  ENMT: oropharynx clear without erythema, moist mucous membranes  LUNGS: good air entry bilaterally, clear to auscultation.  HEART: soft S1/S2, regular rate and rhythm, trace peripheral edema  GASTROINTESTINAL: abdomen is soft, nontender, nondistended, normoactive bowel sounds, no palpable masses  MUSCULOSKELETAL: no clubbing or cyanosis, no obvious deformity  NEUROLOGIC: awake, alert, readily interactive, good muscle tone in 4 extremities    Patient is medically stable for discharge to [facility] with outpatient follow up.  ---  CONSULTANTS:   ID: Ryley  ---  TIME SPENT:   I, the attending physician, was physically present for the key portions of the evaluation and management (E/M) service provided. The total amount of time spent reviewing the hospital course, laboratory values, imaging findings, assessing/counseling the patient, discussing with consultant physicians, social work, nursing staff was 35 minutes.     ---  FINAL DISCHARGE DIAGNOSIS LIST:  Please see last daily progress note for final discharge diagnoses   ADMISSION H+P:    HPI:  Pt had poor medical literacy; unsure about her PMHx and medications.    77 F w/ PMH CAD, HTN, anxiety, GERD, DM Type II, IBS presents w/ falls x1 week. Pt saw her Urologist 1 week ago for urinary frequency and cloudy urine. States she was put on an abx that caused her to have diarrhea and fatigue. Pt was switched to Bactrim a few days ago, she is unsure if she has taken the abx today. Additionally, she has had multiple falls starting soon after she was diagnosed with UTI, without any preceding symptoms including dizziness, palpitations, LOC, or seizure like symptoms. Pt states during her falls, her legs feel like they "just give out". Pt does endorse chronic dizziness that usually occurs after sitting up in bed, however states this is unrelated to her falls. Pt also complaining of a strictly localized pain to her right thigh w/ movement that began after her falls. Denies taking otc medications. Pt states she has been eating and drinking less than normal this past week as well due to feeling unwell.  Denies fevers, headaches, chest pain, palpitations, blurry vision, abd pain, rashes, n/v/c, saddle anesthesia, neck pain, back pain, numbness and tingling.  IN THE ED:  Temp 97.9F, , /63, RR 18, 96 SpO2 on room air  Labs significant for: WBC 16k, hb 11, plt 83, glucose 55, lactate 1.2  Imaging  Xray chest, hip/pelvis, femur: No fracture, Status post right hip and right knee replacement. Surgical hardware intact. Degenerative changes as above. No acute pulmonary disease.  CT Head, neck, spine:  No vertebral fracture is recognized. Lumbar degenerative disc disease results in low-grade central canal stenosis at L3-L4 and L4-L5 and intermediate grade foraminal stenosis  greatest at L4-L5 and L5-S1 on each side. Sacroiliac osteoarthritis, right greater than left  Received in ED: 1L NS  Consults: PT (25 Oct 2022 19:46)      ---  HOSPITAL COURSE: Patient was admitted for management of frequent falls and leukocytosis. ID (Dr. Hedrick) was consulted. Discussed with ID, patient has leukocytosis and even bandemia, however there is no obvious infectious process and patient is clinically stable for discharge. Blood cultures and urine culture were negative. Patient was treated with Ceftriaxone. Cultures likely sterilized by outpt abx. No further antibiotics were warranted at this time.     Patient was medically optimized and improved clinically throughout hospital course. Patient seen and examined on day of discharge.    Vital Signs  T(C): 36.4 (10-30-22 @ 05:50), Max: 36.6 (10-29-22 @ 21:00)  HR: 91 (10-30-22 @ 05:50) (85 - 91)  BP: 134/66 (10-30-22 @ 05:50) (110/72 - 134/66)  RR: 20 (10-30-22 @ 05:50) (20 - 20)  SpO2: 93% (10-30-22 @ 05:50) (91% - 93%)    Physical Exam:  GENERAL: patient appears calm, comfortable, response time somewhat slowed  ENMT: oropharynx clear without erythema, moist mucous membranes  LUNGS: good air entry bilaterally, clear to auscultation.  HEART: soft S1/S2, regular rate and rhythm, trace peripheral edema  GASTROINTESTINAL: abdomen is soft, nontender, nondistended, normoactive bowel sounds, no palpable masses  MUSCULOSKELETAL: no clubbing or cyanosis, no obvious deformity  NEUROLOGIC: awake, alert, readily interactive, good muscle tone in 4 extremities    Patient is medically stable for discharge to [facility] with outpatient follow up.  ---  CONSULTANTS:   ID: Ryley  ---  TIME SPENT:   I, the attending physician, was physically present for the key portions of the evaluation and management (E/M) service provided. The total amount of time spent reviewing the hospital course, laboratory values, imaging findings, assessing/counseling the patient, discussing with consultant physicians, social work, nursing staff was 35 minutes.     ---  FINAL DISCHARGE DIAGNOSIS LIST:  Please see last daily progress note for final discharge diagnoses   ADMISSION H+P:    HPI:  Pt had poor medical literacy; unsure about her PMHx and medications.    77 F w/ PMH CAD, HTN, anxiety, GERD, DM Type II, IBS presents w/ falls x1 week. Pt saw her Urologist 1 week ago for urinary frequency and cloudy urine. States she was put on an abx that caused her to have diarrhea and fatigue. Pt was switched to Bactrim a few days ago, she is unsure if she has taken the abx today. Additionally, she has had multiple falls starting soon after she was diagnosed with UTI, without any preceding symptoms including dizziness, palpitations, LOC, or seizure like symptoms. Pt states during her falls, her legs feel like they "just give out". Pt does endorse chronic dizziness that usually occurs after sitting up in bed, however states this is unrelated to her falls. Pt also complaining of a strictly localized pain to her right thigh w/ movement that began after her falls. Denies taking otc medications. Pt states she has been eating and drinking less than normal this past week as well due to feeling unwell.  Denies fevers, headaches, chest pain, palpitations, blurry vision, abd pain, rashes, n/v/c, saddle anesthesia, neck pain, back pain, numbness and tingling.  IN THE ED:  Temp 97.9F, , /63, RR 18, 96 SpO2 on room air  Labs significant for: WBC 16k, hb 11, plt 83, glucose 55, lactate 1.2  Imaging  Xray chest, hip/pelvis, femur: No fracture, Status post right hip and right knee replacement. Surgical hardware intact. Degenerative changes as above. No acute pulmonary disease.  CT Head, neck, spine:  No vertebral fracture is recognized. Lumbar degenerative disc disease results in low-grade central canal stenosis at L3-L4 and L4-L5 and intermediate grade foraminal stenosis  greatest at L4-L5 and L5-S1 on each side. Sacroiliac osteoarthritis, right greater than left  Received in ED: 1L NS  Consults: PT (25 Oct 2022 19:46)      ---  HOSPITAL COURSE: Patient was admitted for management of frequent falls and leukocytosis. ID (Dr. Hedrick) was consulted. Discussed with ID, patient has leukocytosis and even bandemia, however there is no obvious infectious process and patient is clinically stable for discharge. Blood cultures and urine culture were negative. Patient was treated with Ceftriaxone. Cultures likely sterilized by outpt abx. No further antibiotics were warranted at this time. PT evaluated patient and recommended JOHN, however patient and family declined JOHN and requested discharge home with home care PT.     Patient was medically optimized and improved clinically throughout hospital course. Patient seen and examined on day of discharge.    Vital Signs  T(C): 36.4 (10-30-22 @ 05:50), Max: 36.6 (10-29-22 @ 21:00)  HR: 91 (10-30-22 @ 05:50) (85 - 91)  BP: 134/66 (10-30-22 @ 05:50) (110/72 - 134/66)  RR: 20 (10-30-22 @ 05:50) (20 - 20)  SpO2: 93% (10-30-22 @ 05:50) (91% - 93%)    Physical Exam:  GENERAL: patient appears calm, comfortable, response time somewhat slowed  ENMT: oropharynx clear without erythema, moist mucous membranes  LUNGS: good air entry bilaterally, clear to auscultation.  HEART: soft S1/S2, regular rate and rhythm, trace peripheral edema  GASTROINTESTINAL: abdomen is soft, nontender, nondistended, normoactive bowel sounds, no palpable masses  MUSCULOSKELETAL: no clubbing or cyanosis, no obvious deformity  NEUROLOGIC: awake, alert, readily interactive, good muscle tone in 4 extremities    Patient is medically stable for discharge to home with outpatient follow up.  ---  CONSULTANTS:   ID: Ryley  ---  TIME SPENT:   I, the attending physician, was physically present for the key portions of the evaluation and management (E/M) service provided. The total amount of time spent reviewing the hospital course, laboratory values, imaging findings, assessing/counseling the patient, discussing with consultant physicians, social work, nursing staff was 35 minutes.     ---  FINAL DISCHARGE DIAGNOSIS LIST:  Please see last daily progress note for final discharge diagnoses

## 2022-10-29 NOTE — PROGRESS NOTE ADULT - SUBJECTIVE AND OBJECTIVE BOX
SAURAV QUINONES is a 77yFemale , patient examined and chart reviewed.    INTERVAL HPI/ OVERNIGHT EVENTS:   Afebrile. Awake Alert.   at bedside.    PAST MEDICAL & SURGICAL HISTORY:  Essential hypertension  Anxiety  Environmental allergies  Gastroesophageal reflux disease without esophagitis  H/O: pneumonia  treated in 2016 as an outpatient for pneumonia  OAB (overactive bladder)  Osteopenia  Irritable bowel  COVID-19 vaccine series completed  pfizer, completed 21  Type 2 diabetes mellitus  Osteoarthritis  Hyperlipidemia  Coronary artery disease  Urinary frequency  Mixed stress and urge urinary incontinence  Insomnia  difficulty falling asleep  Diarrhea  increased in frequency recently  Perineal rash  History of vertigo  last episode 2020  Leg swelling  Atypical hyperplasia of right breast  Hearing loss  left  Obesity, Class I, BMI 30-34.9  H/O cataract extraction    History of right knee joint replacement    H/O lumpectomy  right breast - benign  and  atypical cells  Status post double vessel coronary artery bypass    H/O mitral valve repair    History of open reduction and internal fixation (ORIF) procedure  right elbow           For details regarding the patient's social history, family history, and other miscellaneous elements, please refer the initial infectious diseases consultation and/or the admitting history and physical examination for this admission.    ROS:  CONSTITUTIONAL:  Negative fever or chills  EYES:  Negative  blurry vision or double vision  CARDIOVASCULAR:  Negative for chest pain or palpitations  RESPIRATORY:  Negative for cough, wheezing, or SOB   GASTROINTESTINAL:  Negative for nausea, vomiting, diarrhea, constipation, or abdominal pain  GENITOURINARY:  Negative frequency, urgency or dysuria  NEUROLOGIC:  No headache, confusion, dizziness, lightheadedness  All other systems were reviewed and are negative     ALLERGIES:  amoxicillin (Pruritus)  erythromycin (Pruritus)      Current inpatient medications :    ANTIBIOTICS/RELEVANT:      acetaminophen     Tablet .. 650 milliGRAM(s) Oral every 6 hours PRN  aluminum hydroxide/magnesium hydroxide/simethicone Suspension 30 milliLiter(s) Oral every 4 hours PRN  atorvastatin 40 milliGRAM(s) Oral at bedtime  dextrose 5%. 1000 milliLiter(s) IV Continuous <Continuous>  dextrose 5%. 1000 milliLiter(s) IV Continuous <Continuous>  dextrose 50% Injectable 25 Gram(s) IV Push once  dextrose 50% Injectable 12.5 Gram(s) IV Push once  dextrose 50% Injectable 25 Gram(s) IV Push once  dextrose Oral Gel 15 Gram(s) Oral once PRN  diazepam    Tablet 5 milliGRAM(s) Oral at bedtime  enoxaparin Injectable 40 milliGRAM(s) SubCutaneous every 24 hours  fenofibrate Tablet 145 milliGRAM(s) Oral daily  FLUoxetine 20 milliGRAM(s) Oral daily  furosemide    Tablet 40 milliGRAM(s) Oral daily  glucagon  Injectable 1 milliGRAM(s) IntraMuscular once  insulin lispro (ADMELOG) corrective regimen sliding scale   SubCutaneous three times a day before meals  insulin lispro (ADMELOG) corrective regimen sliding scale   SubCutaneous at bedtime  melatonin 3 milliGRAM(s) Oral at bedtime PRN  metoprolol succinate  milliGRAM(s) Oral daily  ondansetron Injectable 4 milliGRAM(s) IV Push every 8 hours PRN  oxybutynin XL 10 milliGRAM(s) Oral daily  pantoprazole    Tablet 40 milliGRAM(s) Oral before breakfast      Objective:    10-26 @ 07:01  -  10-27 @ 07:00  --------------------------------------------------------  IN: 0 mL / OUT: 600 mL / NET: -600 mL      T(C): 36.8 (10-27-22 @ 11:13), Max: 36.9 (10-26-22 @ 18:45)  HR: 85 (10-27-22 @ 11:13) (78 - 102)  BP: 122/74 (10-27-22 @ 11:13) (122/74 - 147/86)  RR: 18 (10-27-22 @ 11:13) (18 - 18)  SpO2: 91% (10-27-22 @ 11:13) (91% - 95%)      Physical Exam:  General: well developed well nourished, in no acute distress  Neck: supple, trachea midline  Lungs: clear, no wheeze/rhonchi  Cardiovascular: regular rate and rhythm, S1 S2  Abdomen: soft, nontender,  bowel sounds normal  Neurological: alert and oriented x3  Skin: no rash  Extremities: no cyanosis/clubbing/edema            LABS:                          10.7   13.07 )-----------( 122      ( 27 Oct 2022 06:25 )             32.7       10-27    140  |  104  |  15  ----------------------------<  146<H>  3.8   |  29  |  0.52    Ca    9.2      27 Oct 2022 06:25  Phos  3.0     10  Mg     1.9     10-27    TPro  8.0  /  Alb  3.5  /  TBili  1.0  /  DBili  x   /  AST  38<H>  /  ALT  33  /  AlkPhos  37<L>  10        Urinalysis Basic - ( 25 Oct 2022 23:40 )    Color: Yellow / Appearance: Clear / S.010 / pH: x  Gluc: x / Ketone: Trace  / Bili: Negative / Urobili: Negative   Blood: x / Protein: Negative / Nitrite: Negative   Leuk Esterase: Negative / RBC: x / WBC x   Sq Epi: x / Non Sq Epi: x / Bacteria: x    RECENT CULTURES:    Culture - Urine (collected 25 Oct 2022 23:40)  Source: Clean Catch Clean Catch (Midstream)  Final Report (27 Oct 2022 07:41):    <10,000 CFU/mL Normal Urogenital Ifeoma    Culture - Blood (collected 25 Oct 2022 16:07)  Source: .Blood Blood  Preliminary Report (26 Oct 2022 23:02):    No growth to date.    Culture - Blood (collected 25 Oct 2022 16:02)  Source: .Blood Blood  Preliminary Report (26 Oct 2022 23:02):    No growth to date.    RADIOLOGY & ADDITIONAL STUDIES:    ACC: [id number] EXAM:  XR CHEST PORTABLE IMMED 1V                        ACC: [id number] EXAM:  XR HIPS BI WITH PELV 3-4V                        ACC: [id number] EXAM:  XR FEMUR 2 VIEWS RT                          PROCEDURE DATE:  10/25/2022          INTERPRETATION:  XR FEMUR 2 VIEWS RIGHT, XR HIPS WITH PELVIS 3 OR 4   VIEWS, XR CHEST IMMEDIATE    Clinical History: Difficulty ambulating    AP pelvis and 2 views of the bilateral hips. 2 views of the right femur.      FINDINGS:    There is no fracture, dislocation, soft tissue swelling or joint effusion.  Status post total right hip replacement. Status post total right knee   replacement. Hardware intact.  Moderate degenerative changes and narrowing of the left hip.  Degenerative changes of the visualized lower lumbar spine, pubic   symphysis and sacroiliac joints.  Mild bony overgrowth of the proximal left femur just inferior to the   greater trochanter. This is likely degenerative.  Atherosclerotic changes.  No radiopaque foreign body.      APchest    Comparison 2021    FINDINGS: Status post median sternotomy, CABG and mitral valve   replacement. Heart size, mediastinum, hilum and aorta are within normal   limits. The lungs are clear. The trachea is midline. The bony structures   are intact. Overlying metallic artifacts..      IMPRESSION:  1.  No fracture.  2.  Status post right hip and right knee replacement. Surgical hardware   intact.  3.  Degenerative changes as above.  4.  No acute pulmonary disease.    Assessment :   77 F w/ PMH CAD, HTN, anxiety, GERD, DM Type II, IBS presented to the hospital with cc of urinary frequency  UA with no pyuria. Found to have AUR and nguyen placed. >1000cc drained.    Doubt UTI symptoms likely sec AUR  Though pt has been on antibiotic  Ucx NGTD    Plan :   Agree to dc antibiotics and monitor off antibiotics  Trend temps and cbc  Nguyen per primary care/urology  Pulm toileting  Increase activity  Stable from ID standpoint      Continue with present regiment.  Appropriate use of antibiotics and adverse effects reviewed.      I have discussed the above plan of care with patient/ in detail. They expressed understanding of the  treatment plan . Risks, benefits and alternatives discussed in detail. I have asked if they have any questions or concerns and appropriately addressed them to the best of my ability .    > 35 minutes were spent in direct patient care reviewing notes, medications ,labs data/ imaging , discussion with multidisciplinary team.    Thank you for allowing me to participate in care of your patient .    Lexii Lazcano MD  Infectious Disease  [phone]
[facility] of INFECTIOUS DISEASE  Esequiel Hedrick MD PhD, Larisa Nguyen MD, Leidy Winston MD, Jaiden Hdez MD, Eris Espana MD  and providing coverage with Lexii Lazcano MD  Providing Infectious Disease Consultations at [facility], [facility], [facility]'s    Office# [phone] to schedule follow up appointments  Answering Service for urgent calls or New Consults [phone]  Cell# to text for urgent issues Esequiel Hedrick [phone]     infectious diseases progress note:    SAURAV QUINONES is a 77y y. o. Female patient    Overnight and events of the last 24hrs reviewed    Allergies    amoxicillin (Pruritus)  erythromycin (Pruritus)    Intolerances        ANTIBIOTICS/RELEVANT:  antimicrobials    immunologic:    OTHER:  acetaminophen     Tablet .. 650 milliGRAM(s) Oral every 6 hours PRN  aluminum hydroxide/magnesium hydroxide/simethicone Suspension 30 milliLiter(s) Oral every 4 hours PRN  atorvastatin 40 milliGRAM(s) Oral at bedtime  dextrose 5%. 1000 milliLiter(s) IV Continuous <Continuous>  dextrose 5%. 1000 milliLiter(s) IV Continuous <Continuous>  dextrose 50% Injectable 25 Gram(s) IV Push once  dextrose 50% Injectable 12.5 Gram(s) IV Push once  dextrose 50% Injectable 25 Gram(s) IV Push once  dextrose Oral Gel 15 Gram(s) Oral once PRN  diazepam    Tablet 5 milliGRAM(s) Oral at bedtime  enoxaparin Injectable 40 milliGRAM(s) SubCutaneous every 24 hours  fenofibrate Tablet 145 milliGRAM(s) Oral daily  FLUoxetine 20 milliGRAM(s) Oral daily  furosemide    Tablet 40 milliGRAM(s) Oral daily  glucagon  Injectable 1 milliGRAM(s) IntraMuscular once  insulin lispro (ADMELOG) corrective regimen sliding scale   SubCutaneous three times a day before meals  insulin lispro (ADMELOG) corrective regimen sliding scale   SubCutaneous at bedtime  melatonin 3 milliGRAM(s) Oral at bedtime PRN  metoprolol succinate  milliGRAM(s) Oral daily  ondansetron Injectable 4 milliGRAM(s) IV Push every 8 hours PRN  oxybutynin XL 10 milliGRAM(s) Oral daily  pantoprazole    Tablet 40 milliGRAM(s) Oral before breakfast      Objective:  Vital Signs Last 24 Hrs  T(C): 36.3 (29 Oct 2022 12:25), Max: 36.4 (28 Oct 2022 20:09)  T(F): 97.3 (29 Oct 2022 12:25), Max: 97.6 (28 Oct 2022 20:09)  HR: 90 (29 Oct 2022 12:25) (75 - 90)  BP: 110/72 (29 Oct 2022 12:25) (110/72 - 158/89)  BP(mean): --  RR: 20 (29 Oct 2022 12:25) (18 - 20)  SpO2: 92% (29 Oct 2022 12:25) (92% - 97%)    Parameters below as of 29 Oct 2022 12:25  Patient On (Oxygen Delivery Method): room air        T(C): 36.3 (10-29-22 @ 12:25), Max: 37.1 (10-27-22 @ 20:00)  T(C): 36.3 (10-29-22 @ 12:25), Max: 37.1 (10-27-22 @ 20:00)  T(C): 36.3 (10-29-22 @ 12:25), Max: 37.1 (10-27-22 @ 20:00)    PHYSICAL EXAM:  HEENT: NC atraumatic  Neck: supple  Respiratory: no accessory muscle use, breathing comfortably  Cardiovascular: distant  Gastrointestinal: normal appearing, nondistended  Extremities: no clubbing, no cyanosis,        LABS:                          11.2   18.69 )-----------( 183      ( 29 Oct 2022 07:13 )             34.5       WBC  18.69 10-29 @ 07:13  16.56 10-28 @ 06:02  13.07 10-27 @ 06:25  10.79 10-26 @ 05:35  16.07 10-25 @ 16:07      10-29    136  |  98  |  17  ----------------------------<  180<H>  4.2   |  28  |  0.53    Ca    9.8      29 Oct 2022 07:13  Phos  3.8     10-28  Mg     1.9     10-28        Creatinine, Serum: 0.53 mg/dL (10-29-22 @ 07:13)  Creatinine, Serum: 0.64 mg/dL (10-28-22 @ 06:02)  Creatinine, Serum: 0.52 mg/dL (10-27-22 @ 06:25)  Creatinine, Serum: 0.38 mg/dL (10-26-22 @ 05:35)  Creatinine, Serum: 0.63 mg/dL (10-25-22 @ 16:07)                INFLAMMATORY MARKERS      MICROBIOLOGY:              RADIOLOGY & ADDITIONAL STUDIES:  
     SAURAV QUINONES is a 77yFemale , patient examined and chart reviewed.    INTERVAL HPI/ OVERNIGHT EVENTS:   Afebrile. Awake Alert.  WBC 16K  with 10% bands today  NAD. Denies any symptoms.    PAST MEDICAL & SURGICAL HISTORY:  Essential hypertension  Anxiety  Environmental allergies  Gastroesophageal reflux disease without esophagitis  H/O: pneumonia  treated in 2016 as an outpatient for pneumonia  OAB (overactive bladder)  Osteopenia  Irritable bowel  COVID-19 vaccine series completed  pfizer, completed 21  Type 2 diabetes mellitus  Osteoarthritis  Hyperlipidemia  Coronary artery disease  Urinary frequency  Mixed stress and urge urinary incontinence  Insomnia  difficulty falling asleep  Diarrhea  increased in frequency recently  Perineal rash  History of vertigo  last episode 2020  Leg swelling  Atypical hyperplasia of right breast  Hearing loss  left  Obesity, Class I, BMI 30-34.9  H/O cataract extraction    History of right knee joint replacement    H/O lumpectomy  right breast - benign  and  atypical cells  Status post double vessel coronary artery bypass    H/O mitral valve repair    History of open reduction and internal fixation (ORIF) procedure  right elbow           For details regarding the patient's social history, family history, and other miscellaneous elements, please refer the initial infectious diseases consultation and/or the admitting history and physical examination for this admission.    ROS:  CONSTITUTIONAL:  Negative fever or chills  EYES:  Negative  blurry vision or double vision  CARDIOVASCULAR:  Negative for chest pain or palpitations  RESPIRATORY:  Negative for cough, wheezing, or SOB   GASTROINTESTINAL:  Negative for nausea, vomiting, diarrhea, constipation, or abdominal pain  GENITOURINARY:  Negative frequency, urgency or dysuria  NEUROLOGIC:  No headache, confusion, dizziness, lightheadedness  All other systems were reviewed and are negative     ALLERGIES:  amoxicillin (Pruritus)  erythromycin (Pruritus)      Current inpatient medications :    ANTIBIOTICS/RELEVANT:    MEDICATIONS  (STANDING):  atorvastatin 40 milliGRAM(s) Oral at bedtime  dextrose 5%. 1000 milliLiter(s) (50 mL/Hr) IV Continuous <Continuous>  dextrose 5%. 1000 milliLiter(s) (100 mL/Hr) IV Continuous <Continuous>  dextrose 50% Injectable 25 Gram(s) IV Push once  dextrose 50% Injectable 12.5 Gram(s) IV Push once  dextrose 50% Injectable 25 Gram(s) IV Push once  diazepam    Tablet 5 milliGRAM(s) Oral at bedtime  enoxaparin Injectable 40 milliGRAM(s) SubCutaneous every 24 hours  fenofibrate Tablet 145 milliGRAM(s) Oral daily  FLUoxetine 20 milliGRAM(s) Oral daily  furosemide    Tablet 40 milliGRAM(s) Oral daily  glucagon  Injectable 1 milliGRAM(s) IntraMuscular once  insulin lispro (ADMELOG) corrective regimen sliding scale   SubCutaneous three times a day before meals  insulin lispro (ADMELOG) corrective regimen sliding scale   SubCutaneous at bedtime  metoprolol succinate  milliGRAM(s) Oral daily  oxybutynin XL 10 milliGRAM(s) Oral daily  pantoprazole    Tablet 40 milliGRAM(s) Oral before breakfast    MEDICATIONS  (PRN):  acetaminophen     Tablet .. 650 milliGRAM(s) Oral every 6 hours PRN Temp greater or equal to 38C (100.4F), Mild Pain (1 - 3)  aluminum hydroxide/magnesium hydroxide/simethicone Suspension 30 milliLiter(s) Oral every 4 hours PRN Dyspepsia  dextrose Oral Gel 15 Gram(s) Oral once PRN Blood Glucose LESS THAN 70 milliGRAM(s)/deciliter  melatonin 3 milliGRAM(s) Oral at bedtime PRN Insomnia  ondansetron Injectable 4 milliGRAM(s) IV Push every 8 hours PRN Nausea and/or Vomiting      Objective:  Vital Signs Last 24 Hrs  T(C): 36.7 (28 Oct 2022 12:38), Max: 37.1 (27 Oct 2022 20:00)  T(F): 98 (28 Oct 2022 12:38), Max: 98.8 (27 Oct 2022 20:00)  HR: 68 (28 Oct 2022 12:38) (68 - 93)  BP: 135/85 (28 Oct 2022 12:38) (121/72 - 144/80)  RR: 20 (28 Oct 2022 12:38) (17 - 20)  SpO2: 94% (28 Oct 2022 12:38) (92% - 96%)    Parameters below as of 28 Oct 2022 12:38  Patient On (Oxygen Delivery Method): room air      Physical Exam:  General: no acute distress  Neck: supple, trachea midline  Lungs: clear, no wheeze/rhonchi  Cardiovascular: regular rate and rhythm, S1 S2  Abdomen: soft, nontender,  bowel sounds normal  Neurological: alert and oriented x3  Skin: no rash  Extremities: no edema        LABS:                                  11.7   16.56 )-----------( 141      ( 28 Oct 2022 06:02 )             35.8     Band Neutrophils %: 10.0 %    10    138  |  99  |  24<H>  ----------------------------<  174<H>  4.2   |  32<H>  |  0.64    Ca    10.2<H>      28 Oct 2022 06:02  Phos  3.8     10-28  Mg     1.9     10-28        Urinalysis Basic - ( 25 Oct 2022 23:40 )    Color: Yellow / Appearance: Clear / S.010 / pH: x  Gluc: x / Ketone: Trace  / Bili: Negative / Urobili: Negative   Blood: x / Protein: Negative / Nitrite: Negative   Leuk Esterase: Negative / RBC: x / WBC x   Sq Epi: x / Non Sq Epi: x / Bacteria: x    RECENT CULTURES:    Culture - Urine (collected 25 Oct 2022 23:40)  Source: Clean Catch Clean Catch (Midstream)  Final Report (27 Oct 2022 07:41):    <10,000 CFU/mL Normal Urogenital Ifeoma    Culture - Blood (collected 25 Oct 2022 16:07)  Source: .Blood Blood  Preliminary Report (26 Oct 2022 23:02):    No growth to date.    Culture - Blood (collected 25 Oct 2022 16:02)  Source: .Blood Blood  Preliminary Report (26 Oct 2022 23:02):    No growth to date.    RADIOLOGY & ADDITIONAL STUDIES:    ACC: [id number] EXAM:  XR CHEST PORTABLE IMMED 1V                        ACC: [id number] EXAM:  XR HIPS BI WITH PELV 3-4V                        ACC: [id number] EXAM:  XR FEMUR 2 VIEWS RT                          PROCEDURE DATE:  10/25/2022          INTERPRETATION:  XR FEMUR 2 VIEWS RIGHT, XR HIPS WITH PELVIS 3 OR 4   VIEWS, XR CHEST IMMEDIATE    Clinical History: Difficulty ambulating    AP pelvis and 2 views of the bilateral hips. 2 views of the right femur.      FINDINGS:    There is no fracture, dislocation, soft tissue swelling or joint effusion.  Status post total right hip replacement. Status post total right knee   replacement. Hardware intact.  Moderate degenerative changes and narrowing of the left hip.  Degenerative changes of the visualized lower lumbar spine, pubic   symphysis and sacroiliac joints.  Mild bony overgrowth of the proximal left femur just inferior to the   greater trochanter. This is likely degenerative.  Atherosclerotic changes.  No radiopaque foreign body.      APchest    Comparison 2021    FINDINGS: Status post median sternotomy, CABG and mitral valve   replacement. Heart size, mediastinum, hilum and aorta are within normal   limits. The lungs are clear. The trachea is midline. The bony structures   are intact. Overlying metallic artifacts..      IMPRESSION:  1.  No fracture.  2.  Status post right hip and right knee replacement. Surgical hardware   intact.  3.  Degenerative changes as above.  4.  No acute pulmonary disease.    Assessment :   77 F w/ PMH CAD, HTN, anxiety, GERD, DM Type II, IBS presented to the hospital with cc of urinary frequency  UA with no pyuria. Found to have AUR and nguyen placed. >1000cc drained.    Doubt UTI symptoms likely sec AUR  Ucx NGTD   Worsening leukocytosis with bandemia today- nontoxic.    Plan :   Monitor off antibiotics  Trend temps and cbc  Nguyen dc'd TOV today  Pulm toileting  Increase activity  Stable from ID standpoint    D/w Dr López      Continue with present regiment.  Appropriate use of antibiotics and adverse effects reviewed.      I have discussed the above plan of care with patient/ in detail. They expressed understanding of the  treatment plan . Risks, benefits and alternatives discussed in detail. I have asked if they have any questions or concerns and appropriately addressed them to the best of my ability .    > 35 minutes were spent in direct patient care reviewing notes, medications ,labs data/ imaging , discussion with multidisciplinary team.    Thank you for allowing me to participate in care of your patient .    Lexii Lazcano MD  Infectious Disease  [phone]
Patient seen and examined  feels well  vitals stable  +nguyen. yellow urine  on rocephin  u/a bland  blood and urine cx pending  white count downtrending    Review of Systems:  General:denies fever chills, headache, weakness  HEENT: denies blurry vision,diffculty swallowing, difficulty hearing, tinnitus  Cardiovascular: denies chest pain  ,palpitations  Pulmonary:denies shortness of breath, cough, wheezing, hemoptysis  Gastrointestinal: denies abdominal pain, constipation, diarrhea,nausea , vomiting, hematochezia  : denies hematuria, dysuria, or incontinence  Neurological: denies weakness, numbness , tingling, dizziness, tremors  MSK: denies muscle pain, difficulty ambulating, swelling, back pain  skin: denies skin rash, itching, burning, or  skin lesions  Psychiatrical: denies mood disturbances, anxierty, feeling depressed, depression , or difficulty sleeping    Objective:  Vitals  T(C): 36.6 (10-26-22 @ 07:51), Max: 36.8 (10-25-22 @ 23:51)  HR: 70 (10-26-22 @ 07:51) (70 - 100)  BP: 119/76 (10-26-22 @ 07:51) (109/63 - 155/70)  RR: 17 (10-26-22 @ 07:51) (16 - 18)  SpO2: 96% (10-26-22 @ 07:51) (96% - 98%)    Physical Exam:  General: comfortable, no acute distress, well nourished  HEENT: Atraumatic, no LAD, trachea midline, PERRLA  Cardiovascular: normal s1s2, no murmurs, gallops or fricition rubs  Pulmonary: clear to ausculation Bilaterally, no wheezing , rhonchi  Gastrointestinal: soft non tender non distended, no masses felt, no organomegally  Muscloskeletal: no lower extremity edema, intact bilateral lower extremity pulses  Neurological: CN II-12 intact. No focal weakness  Psychiatrical: normal mood, cooperative  SKIN: no rash, lesions or ulcers    Labs:                          9.4    10.79 )-----------( 84       ( 26 Oct 2022 05:35 )             28.7     10-26    140  |  108  |  11  ----------------------------<  56<L>  3.8   |  24  |  0.38<L>    Ca    8.6      26 Oct 2022 05:35  Mg     2.0     10-25    TPro  8.0  /  Alb  3.5  /  TBili  1.0  /  DBili  x   /  AST  38<H>  /  ALT  33  /  AlkPhos  37<L>  10-25    LIVER FUNCTIONS - ( 25 Oct 2022 16:07 )  Alb: 3.5 g/dL / Pro: 8.0 g/dL / ALK PHOS: 37 U/L / ALT: 33 U/L / AST: 38 U/L / GGT: x                 Active Medications  MEDICATIONS  (STANDING):  atorvastatin 40 milliGRAM(s) Oral at bedtime  cefTRIAXone   IVPB 1000 milliGRAM(s) IV Intermittent every 24 hours  dextrose 5%. 1000 milliLiter(s) (50 mL/Hr) IV Continuous <Continuous>  dextrose 5%. 1000 milliLiter(s) (100 mL/Hr) IV Continuous <Continuous>  dextrose 50% Injectable 25 Gram(s) IV Push once  dextrose 50% Injectable 12.5 Gram(s) IV Push once  dextrose 50% Injectable 25 Gram(s) IV Push once  diazepam    Tablet 5 milliGRAM(s) Oral at bedtime  enoxaparin Injectable 40 milliGRAM(s) SubCutaneous every 24 hours  fenofibrate Tablet 145 milliGRAM(s) Oral daily  FLUoxetine 20 milliGRAM(s) Oral daily  furosemide    Tablet 40 milliGRAM(s) Oral daily  glucagon  Injectable 1 milliGRAM(s) IntraMuscular once  insulin lispro (ADMELOG) corrective regimen sliding scale   SubCutaneous three times a day before meals  insulin lispro (ADMELOG) corrective regimen sliding scale   SubCutaneous at bedtime  metoprolol succinate  milliGRAM(s) Oral daily  oxybutynin XL 10 milliGRAM(s) Oral daily  pantoprazole    Tablet 40 milliGRAM(s) Oral before breakfast    MEDICATIONS  (PRN):  acetaminophen     Tablet .. 650 milliGRAM(s) Oral every 6 hours PRN Temp greater or equal to 38C (100.4F), Mild Pain (1 - 3)  aluminum hydroxide/magnesium hydroxide/simethicone Suspension 30 milliLiter(s) Oral every 4 hours PRN Dyspepsia  dextrose Oral Gel 15 Gram(s) Oral once PRN Blood Glucose LESS THAN 70 milliGRAM(s)/deciliter  melatonin 3 milliGRAM(s) Oral at bedtime PRN Insomnia  ondansetron Injectable 4 milliGRAM(s) IV Push every 8 hours PRN Nausea and/or Vomiting    
Patient is a 77y old  Female who presents with a chief complaint of Repeated falls, JOHN dispo (28 Oct 2022 16:45)      INTERVAL HPI/OVERNIGHT EVENTS: Pt seen and examined at bedside. No acute events overnight. Pt states that she was able to ambulate to the bathroom using a walker this morning 3 times to urinate after having her nguyen removed. She denies any complaints. She denies headache, cp, sob, n/v/s/c. Edema is improved.     MEDICATIONS  (STANDING):  atorvastatin 40 milliGRAM(s) Oral at bedtime  dextrose 5%. 1000 milliLiter(s) (50 mL/Hr) IV Continuous <Continuous>  dextrose 5%. 1000 milliLiter(s) (100 mL/Hr) IV Continuous <Continuous>  dextrose 50% Injectable 25 Gram(s) IV Push once  dextrose 50% Injectable 12.5 Gram(s) IV Push once  dextrose 50% Injectable 25 Gram(s) IV Push once  diazepam    Tablet 5 milliGRAM(s) Oral at bedtime  enoxaparin Injectable 40 milliGRAM(s) SubCutaneous every 24 hours  fenofibrate Tablet 145 milliGRAM(s) Oral daily  FLUoxetine 20 milliGRAM(s) Oral daily  furosemide    Tablet 40 milliGRAM(s) Oral daily  glucagon  Injectable 1 milliGRAM(s) IntraMuscular once  insulin lispro (ADMELOG) corrective regimen sliding scale   SubCutaneous three times a day before meals  insulin lispro (ADMELOG) corrective regimen sliding scale   SubCutaneous at bedtime  metoprolol succinate  milliGRAM(s) Oral daily  oxybutynin XL 10 milliGRAM(s) Oral daily  pantoprazole    Tablet 40 milliGRAM(s) Oral before breakfast    MEDICATIONS  (PRN):  acetaminophen     Tablet .. 650 milliGRAM(s) Oral every 6 hours PRN Temp greater or equal to 38C (100.4F), Mild Pain (1 - 3)  aluminum hydroxide/magnesium hydroxide/simethicone Suspension 30 milliLiter(s) Oral every 4 hours PRN Dyspepsia  dextrose Oral Gel 15 Gram(s) Oral once PRN Blood Glucose LESS THAN 70 milliGRAM(s)/deciliter  melatonin 3 milliGRAM(s) Oral at bedtime PRN Insomnia  ondansetron Injectable 4 milliGRAM(s) IV Push every 8 hours PRN Nausea and/or Vomiting      Allergies    amoxicillin (Pruritus)  erythromycin (Pruritus)    Intolerances        REVIEW OF SYSTEMS:  CONSTITUTIONAL: denies fever, chills   HEENT: denies blurred vision, sore throat  CARDIOVASCULAR: denies chest pain, chest pressure, palpitations  RESPIRATORY: reports mild dyspnea with exertion  GASTROINTESTINAL: denies nausea, vomiting, diarrhea, abdominal pain  NEUROLOGICAL: denies numbness, headache, focal weakness  MUSCULOSKELETAL: denies new joint pain, muscle aches    Vital Signs Last 24 Hrs  T(C): 36.3 (29 Oct 2022 05:35), Max: 36.7 (28 Oct 2022 12:38)  T(F): 97.4 (29 Oct 2022 05:35), Max: 98 (28 Oct 2022 12:38)  HR: 75 (29 Oct 2022 05:35) (68 - 89)  BP: 158/89 (29 Oct 2022 05:35) (130/77 - 158/89)  BP(mean): --  RR: 20 (29 Oct 2022 05:35) (18 - 20)  SpO2: 97% (29 Oct 2022 05:35) (94% - 97%)    Parameters below as of 29 Oct 2022 05:35  Patient On (Oxygen Delivery Method): room air        PHYSICAL EXAM:  GENERAL: patient appears calm, comfortable, response time somewhat slowed  ENMT: oropharynx clear without erythema, moist mucous membranes  LUNGS: good air entry bilaterally, clear to auscultation.  HEART: soft S1/S2, regular rate and rhythm, trace peripheral edema  GASTROINTESTINAL: abdomen is soft, nontender, nondistended, normoactive bowel sounds, no palpable masses  MUSCULOSKELETAL: no clubbing or cyanosis, no obvious deformity  NEUROLOGIC: awake, alert, readily interactive, good muscle tone in 4 extremities    LABS:                        11.2   18.69 )-----------( 183      ( 29 Oct 2022 07:13 )             34.5     CBC Full  -  ( 29 Oct 2022 07:13 )  WBC Count : 18.69 K/uL  Hemoglobin : 11.2 g/dL  Hematocrit : 34.5 %  Platelet Count - Automated : 183 K/uL  Mean Cell Volume : 87.8 fl  Mean Cell Hemoglobin : 28.5 pg  Mean Cell Hemoglobin Concentration : 32.5 gm/dL  Auto Neutrophil # : x  Auto Lymphocyte # : x  Auto Monocyte # : x  Auto Eosinophil # : x  Auto Basophil # : x  Auto Neutrophil % : x  Auto Lymphocyte % : x  Auto Monocyte % : x  Auto Eosinophil % : x  Auto Basophil % : x    29 Oct 2022 07:13    136    |  98     |  17     ----------------------------<  180    4.2     |  28     |  0.53     Ca    9.8        29 Oct 2022 07:13          CAPILLARY BLOOD GLUCOSE      POCT Blood Glucose.: 188 mg/dL (29 Oct 2022 07:23)  POCT Blood Glucose.: 192 mg/dL (28 Oct 2022 21:33)  POCT Blood Glucose.: 208 mg/dL (28 Oct 2022 16:15)  POCT Blood Glucose.: 176 mg/dL (28 Oct 2022 11:23)        Culture - Urine (collected 10-25-22 @ 23:40)  Source: Clean Catch Clean Catch (Midstream)  Final Report (10-27-22 @ 07:41):    <10,000 CFU/mL Normal Urogenital Ifeoma    Culture - Blood (collected 10-25-22 @ 16:07)  Source: .Blood Blood  Preliminary Report (10-26-22 @ 23:02):    No growth to date.    Culture - Blood (collected 10-25-22 @ 16:02)  Source: .Blood Blood  Preliminary Report (10-26-22 @ 23:02):    No growth to date.        RADIOLOGY & ADDITIONAL TESTS:    Personally reviewed.     Consultant(s) Notes Reviewed:  [x] YES  [ ] NO    
Name: SAURAV QUINONES  MRN: [id number]  LOCATION: [address]    ----  Patient is a 77y old  Female who presents with a chief complaint of Repeated falls, JOHN dispo (27 Oct 2022 13:47)      FROM ADMISSION H+P:   HPI:  Pt had poor medical literacy; unsure about her PMHx and medications.    77 F w/ PMH CAD, HTN, anxiety, GERD, DM Type II, IBS presents w/ falls x1 week. Pt saw her Urologist 1 week ago for urinary frequency and cloudy urine. States she was put on an abx that caused her to have diarrhea and fatigue. Pt was switched to Bactrim a few days ago, she is unsure if she has taken the abx today. Additionally, she has had multiple falls starting soon after she was diagnosed with UTI, without any preceding symptoms including dizziness, palpitations, LOC, or seizure like symptoms. Pt states during her falls, her legs feel like they "just give out". Pt does endorse chronic dizziness that usually occurs after sitting up in bed, however states this is unrelated to her falls. Pt also complaining of a strictly localized pain to her right thigh w/ movement that began after her falls. Denies taking otc medications. Pt states she has been eating and drinking less than normal this past week as well due to feeling unwell.      ----  INTERVAL HPI/OVERNIGHT EVENTS: Pt seen and evaluated at the bedside. No acute overnight events occurred. The patient reports somewhat worsening of the edema in her legs and feels lack of energy today. She's tired and wants to sleep all day. She has had no fever/chills. She wants to dc the [place] She has no other focal complaints reported at this time. No suprapubic pain.     ----  PAST MEDICAL & SURGICAL HISTORY:  Essential hypertension      Anxiety      Environmental allergies      Gastroesophageal reflux disease without esophagitis      H/O: pneumonia  treated in 2016 as an outpatient for pneumonia      OAB (overactive bladder)      Osteopenia      Irritable bowel      COVID-19 vaccine series completed  pfizer, completed 21      Type 2 diabetes mellitus      Osteoarthritis      Hyperlipidemia      Coronary artery disease      Urinary frequency      Mixed stress and urge urinary incontinence      Insomnia  difficulty falling asleep      Diarrhea  increased in frequency recently      Perineal rash      History of vertigo  last episode 2020      Leg swelling      Atypical hyperplasia of right breast      Hearing loss  left      Obesity, Class I, BMI 30-34.9      H/O cataract extraction        History of right knee joint replacement        H/O lumpectomy  right breast - benign  and  atypical cells      Status post double vessel coronary artery bypass        H/O mitral valve repair        History of open reduction and internal fixation (ORIF) procedure  right elbow 2019          FAMILY HISTORY:  Congestive heart failure (Father)    Family history of breast cancer in mother (Mother)  grandmother and aunt    FH: type 2 diabetes (Father, Sibling)    Family history of osteoarthritis (Mother)        Allergies    amoxicillin (Pruritus)  erythromycin (Pruritus)    Intolerances        ----  ANTIMICROBIALS:    CARDIOVASCULAR:  furosemide    Tablet 40 milliGRAM(s) Oral daily  metoprolol succinate  milliGRAM(s) Oral daily    GASTROINTESTINAL:  aluminum hydroxide/magnesium hydroxide/simethicone Suspension 30 milliLiter(s) Oral every 4 hours PRN  pantoprazole    Tablet 40 milliGRAM(s) Oral before breakfast    PULMONARY:      ----  REVIEW OF SYSTEMS:  CONSTITUTIONAL: denies fever, chills   HEENT: denies blurred vision, sore throat  CARDIOVASCULAR: denies chest pain, chest pressure, palpitations  RESPIRATORY: reports mild dyspnea on exertion also notes orthopnea and coughing at night  GASTROINTESTINAL: denies nausea, vomiting, diarrhea, abdominal pain  NEUROLOGICAL: denies numbness, headache, focal weakness  MUSCULOSKELETAL: denies new joint pain, muscle aches    ----  PHYSICAL EXAM:  GENERAL: patient appears calm, comfortable, response time somehwat slowed  ENMT: oropharynx clear without erythema, moist mucous membranes  LUNGS: good air entry bilaterally, clear to auscultation. trace rales noted in the bases  HEART: soft S1/S2, regular rate and rhythm, distant heart sounds, trace peripheral edema  GASTROINTESTINAL: abdomen is soft, nontender, nondistended, normoactive bowel sounds, no palpable masses  MUSCULOSKELETAL: no clubbing or cyanosis, no obvious deformity  NEUROLOGIC: awake, alert, readily interactive, good muscle tone in 4 extremities    T(C): 36.8 (10-27-22 @ 11:13), Max: 36.9 (10-26-22 @ 18:45)  HR: 85 (10-27-22 @ 11:13) (78 - 102)  BP: 122/74 (10-27-22 @ 11:13) (122/74 - 147/86)  RR: 18 (10-27-22 @ 11:13) (18 - 18)  SpO2: 91% (10-27-22 @ 11:13) (91% - 95%)  Wt(kg): --    ----  INTAKE & OUTPUT:  I&O's Summary    26 Oct 2022 07:01  -  27 Oct 2022 07:00  --------------------------------------------------------  IN: 0 mL / OUT: 600 mL / NET: -600 mL    27 Oct 2022 07:01  -  27 Oct 2022 15:03  --------------------------------------------------------  IN: 0 mL / OUT: 850 mL / NET: -850 mL        LABS:                        10.7   13.07 )-----------( 122      ( 27 Oct 2022 06:25 )             32.7     1027    140  |  104  |  15  ----------------------------<  146<H>  3.8   |  29  |  0.52    Ca    9.2      27 Oct 2022 06:25  Phos  3.0     10  Mg     1.9     10-27    TPro  8.0  /  Alb  3.5  /  TBili  1.0  /  DBili  x   /  AST  38<H>  /  ALT  33  /  AlkPhos  37<L>  10-25      Urinalysis Basic - ( 25 Oct 2022 23:40 )    Color: Yellow / Appearance: Clear / S.010 / pH: x  Gluc: x / Ketone: Trace  / Bili: Negative / Urobili: Negative   Blood: x / Protein: Negative / Nitrite: Negative   Leuk Esterase: Negative / RBC: x / WBC x   Sq Epi: x / Non Sq Epi: x / Bacteria: x      CAPILLARY BLOOD GLUCOSE      POCT Blood Glucose.: 142 mg/dL (27 Oct 2022 11:41)  POCT Blood Glucose.: 164 mg/dL (27 Oct 2022 07:45)  POCT Blood Glucose.: 123 mg/dL (26 Oct 2022 18:01)        Culture - Urine (collected 10-25-22 @ 23:40)  Source: Clean Catch Clean Catch (Midstream)  Final Report (10-27-22 @ 07:41):    <10,000 CFU/mL Normal Urogenital Ifeoma    Culture - Blood (collected 10-25-22 @ 16:07)  Source: .Blood Blood  Preliminary Report (10-26-22 @ 23:02):    No growth to date.    Culture - Blood (collected 10-25-22 @ 16:02)  Source: .Blood Blood  Preliminary Report (10-26-22 @ 23:02):    No growth to date.        ----  Personally reviewed:  Vital sign trends: [ x ] yes    [  ] no     [  ] n/a  Laboratory results: [ x ] yes    [  ] no     [  ] n/a  Radiology results: [ x ] yes    [  ] no     [  ] n/a  Microbio results: [x  ] yes    [  ] no     [  ] n/a  Consultant recommendations: [ x ] yes    [  ] no     [  ] n/a        
Name: SAURAV QUINONES  MRN: [id number]  LOCATION: [address]    ----  Patient is a 77y old  Female who presents with a chief complaint of Repeated falls, JOHN dispo (28 Oct 2022 13:36)      FROM ADMISSION H+P:   HPI:  Pt had poor medical literacy; unsure about her PMHx and medications.    77 F w/ PMH CAD, HTN, anxiety, GERD, DM Type II, IBS presents w/ falls x1 week. Pt saw her Urologist 1 week ago for urinary frequency and cloudy urine. States she was put on an abx that caused her to have diarrhea and fatigue. Pt was switched to Bactrim a few days ago, she is unsure if she has taken the abx today. Additionally, she has had multiple falls starting soon after she was diagnosed with UTI, without any preceding symptoms including dizziness, palpitations, LOC, or seizure like symptoms. Pt states during her falls, her legs feel like they "just give out". Pt does endorse chronic dizziness that usually occurs after sitting up in bed, however states this is unrelated to her falls. Pt also complaining of a strictly localized pain to her right thigh w/ movement that began after her falls. Denies taking otc medications. Pt states she has been eating and drinking less than normal this past week as well due to feeling unwell.      ----  INTERVAL HPI/OVERNIGHT EVENTS: Pt seen and evaluated at the bedside. No acute overnight events occurred. The pt reports feeling somewhat fatigued and is not yet quite back to her baseline functional status. She states that she needs help getting back and forth to the bathroom. She states that she has diarrhea predominant IBS and frequently takes imodium at home. She has been constipation while inpatient. She had a small BM today. She started TOV this afternoon and was able to void a small amount this afternoon. Serial bladder scans orderd. No other focal complaints reported. Bandemia noted on CBC w/ uptrending leukocytosis, Afebrile last 24hrs.     ----  PAST MEDICAL & SURGICAL HISTORY:  Essential hypertension      Anxiety      Environmental allergies      Gastroesophageal reflux disease without esophagitis      H/O: pneumonia  treated in August 2016 as an outpatient for pneumonia      OAB (overactive bladder)      Osteopenia      Irritable bowel      COVID-19 vaccine series completed  pfizer, completed 4/8/21      Type 2 diabetes mellitus      Osteoarthritis      Hyperlipidemia      Coronary artery disease      Urinary frequency      Mixed stress and urge urinary incontinence      Insomnia  difficulty falling asleep      Diarrhea  increased in frequency recently      Perineal rash      History of vertigo  last episode December 2020      Leg swelling      Atypical hyperplasia of right breast      Hearing loss  left      Obesity, Class I, BMI 30-34.9      H/O cataract extraction  2005      History of right knee joint replacement  2005      H/O lumpectomy  right breast - benign 1993 and 2020 atypical cells      Status post double vessel coronary artery bypass  2015      H/O mitral valve repair  2015      History of open reduction and internal fixation (ORIF) procedure  right elbow 2019          FAMILY HISTORY:  Congestive heart failure (Father)    Family history of breast cancer in mother (Mother)  grandmother and aunt    FH: type 2 diabetes (Father, Sibling)    Family history of osteoarthritis (Mother)        Allergies    amoxicillin (Pruritus)  erythromycin (Pruritus)    Intolerances        ----  ANTIMICROBIALS:    CARDIOVASCULAR:  furosemide    Tablet 40 milliGRAM(s) Oral daily  metoprolol succinate  milliGRAM(s) Oral daily    GASTROINTESTINAL:  aluminum hydroxide/magnesium hydroxide/simethicone Suspension 30 milliLiter(s) Oral every 4 hours PRN  pantoprazole    Tablet 40 milliGRAM(s) Oral before breakfast    PULMONARY:      ----  REVIEW OF SYSTEMS:  CONSTITUTIONAL: denies fever, chills   HEENT: denies blurred vision, sore throat  CARDIOVASCULAR: denies chest pain, chest pressure, palpitations  RESPIRATORY: no coughing or wheezing  GASTROINTESTINAL: denies nausea, vomiting, diarrhea, abdominal pain  NEUROLOGICAL: denies numbness, headache, focal weakness  MUSCULOSKELETAL: denies new joint pain, muscle aches    ----  PHYSICAL EXAM:  GENERAL: patient appears calm, comfortable, response time somewhat but family at bedside without active concerns  ENMT: oropharynx clear without erythema, moist mucous membranes  LUNGS: good air entry bilaterally, clear to auscultation. trace rales noted in the bases  HEART: soft S1/S2, regular rate and rhythm, distant heart sounds, trace peripheral edema  GASTROINTESTINAL: abdomen is soft, nontender, nondistended, normoactive bowel sounds, no palpable masses  MUSCULOSKELETAL: no clubbing or cyanosis, no obvious deformity  NEUROLOGIC: awake, alert, readily interactive, good muscle tone in 4 extremities    T(C): 36.7 (10-28-22 @ 12:38), Max: 37.1 (10-27-22 @ 20:00)  HR: 68 (10-28-22 @ 12:38) (68 - 93)  BP: 135/85 (10-28-22 @ 12:38) (121/72 - 144/80)  RR: 20 (10-28-22 @ 12:38) (17 - 20)  SpO2: 94% (10-28-22 @ 12:38) (92% - 96%)  Wt(kg): --    ----  INTAKE & OUTPUT:  I&O's Summary    27 Oct 2022 07:01  -  28 Oct 2022 07:00  --------------------------------------------------------  IN: 0 mL / OUT: 1350 mL / NET: -1350 mL        LABS:                        11.7   16.56 )-----------( 141      ( 28 Oct 2022 06:02 )             35.8     10-28    138  |  99  |  24<H>  ----------------------------<  174<H>  4.2   |  32<H>  |  0.64    Ca    10.2<H>      28 Oct 2022 06:02  Phos  3.8     10-28  Mg     1.9     10-28          CAPILLARY BLOOD GLUCOSE      POCT Blood Glucose.: 208 mg/dL (28 Oct 2022 16:15)  POCT Blood Glucose.: 176 mg/dL (28 Oct 2022 11:23)  POCT Blood Glucose.: 162 mg/dL (28 Oct 2022 07:53)  POCT Blood Glucose.: 194 mg/dL (27 Oct 2022 21:22)  POCT Blood Glucose.: 153 mg/dL (27 Oct 2022 16:51)        Culture - Urine (collected 10-25-22 @ 23:40)  Source: Clean Catch Clean Catch (Midstream)  Final Report (10-27-22 @ 07:41):    <10,000 CFU/mL Normal Urogenital Ifeoma        ----  Personally reviewed:  Vital sign trends: [ x ] yes    [  ] no     [  ] n/a  Laboratory results: [ x ] yes    [  ] no     [  ] n/a  Radiology results: [ x ] yes    [  ] no     [  ] n/a  Microbio results: [ x ] yes    [  ] no     [  ] n/a  Consultant recommendations: [ x ] yes    [  ] no     [  ] n/a

## 2022-10-29 NOTE — DISCHARGE NOTE PROVIDER - NSDCMRMEDTOKEN_GEN_ALL_CORE_FT
Astelin 137 mcg/inh nasal spray: 2 spray(s) nasal 2 times a day  atorvastatin 40 mg oral tablet: 1 tab(s) orally once a day (at bedtime)  diazePAM 5 mg oral tablet: 1 tab(s) orally once a day (at bedtime)  empagliflozin 25 mg oral tablet: 1 tab(s) orally once a day (in the morning)  fenofibrate 160 mg oral tablet: 1 tab(s) orally once a day  Flonase 50 mcg/inh nasal spray: 1 spray(s) nasal 2 times a day  FLUoxetine 20 mg oral capsule: 1 cap(s) orally once a day  furosemide 40 mg oral tablet: 1 tab(s) orally once a day  glimepiride 4 mg oral tablet: 8 milligram(s) orally once a day  meclizine 12.5 mg oral tablet: orally 1 to 4 times a day, As Needed  metoprolol succinate 50 mg oral tablet, extended release: 3 tab(s) orally once a day  Multiple Vitamins oral tablet: 1 tab(s) orally once a day  omeprazole 20 mg oral delayed release capsule: 1 cap(s) orally once a day  oxybutynin 10 mg/24 hr oral tablet, extended release: 1 tab(s) orally once a day  Trulicity Pen 1.5 mg/0.5 mL subcutaneous solution: 1 dose(s) subcutaneous once a week  Monday   acetaminophen 325 mg oral tablet: 2 tab(s) orally every 6 hours, As needed, Temp greater or equal to 38C (100.4F), Mild Pain (1 - 3)  Astelin 137 mcg/inh nasal spray: 2 spray(s) nasal 2 times a day  atorvastatin 40 mg oral tablet: 1 tab(s) orally once a day (at bedtime)  diazePAM 5 mg oral tablet: 1 tab(s) orally once a day (at bedtime)  empagliflozin 25 mg oral tablet: 1 tab(s) orally once a day (in the morning)  fenofibrate 145 mg oral tablet: 1 tab(s) orally once a day  Flonase 50 mcg/inh nasal spray: 1 spray(s) nasal 2 times a day  FLUoxetine 20 mg oral capsule: 1 cap(s) orally once a day  furosemide 40 mg oral tablet: 1 tab(s) orally once a day  glimepiride 4 mg oral tablet: 8 milligram(s) orally once a day  meclizine 12.5 mg oral tablet: orally 1 to 4 times a day, As Needed  metoprolol succinate 50 mg oral tablet, extended release: 3 tab(s) orally once a day  Multiple Vitamins oral tablet: 1 tab(s) orally once a day  omeprazole 20 mg oral delayed release capsule: 1 cap(s) orally once a day  oxybutynin 10 mg/24 hr oral tablet, extended release: 1 tab(s) orally once a day  Trulicity Pen 1.5 mg/0.5 mL subcutaneous solution: 1 dose(s) subcutaneous once a week  Monday

## 2022-10-29 NOTE — PROGRESS NOTE ADULT - PROBLEM SELECTOR PLAN 6
-Continue home aspirin, statin, fenofibrate.  -BP Control  -CC/Dash/TLC diet
On home metformin, trulicity and Jardiance. Not on home insulin.  -Hypoglycemic to 55 on admission in the setting of decreased PO intake  Last 24 hours of POC Glucose checks:   POCT Blood Glucose.: 208 mg/dL (10-28-22 @ 16:15)  POCT Blood Glucose.: 176 mg/dL (10-28-22 @ 11:23)  POCT Blood Glucose.: 162 mg/dL (10-28-22 @ 07:53)  POCT Blood Glucose.: 194 mg/dL (10-27-22 @ 21:22)  A1C with Estimated Average Glucose Result: 6.7 % (10-26-22 @ 05:35)
Active cardiovascular meds:  furosemide    Tablet 40 milliGRAM(s) Oral daily  metoprolol succinate  milliGRAM(s) Oral daily  - will continue w/ holding parameters as indicated  - BP readings for last 24 hours: BP:  (122/74 - 147/86)
On home metformin, trulicity and Jardiance. Not on home insulin.  -Hypoglycemic to 55 on admission in the setting of decreased PO intake  Last 24 hours of POC Glucose checks:   POCT Blood Glucose.: 208 mg/dL (10-28-22 @ 16:15)  POCT Blood Glucose.: 176 mg/dL (10-28-22 @ 11:23)  POCT Blood Glucose.: 162 mg/dL (10-28-22 @ 07:53)  POCT Blood Glucose.: 194 mg/dL (10-27-22 @ 21:22)  A1C with Estimated Average Glucose Result: 6.7 % (10-26-22 @ 05:35)

## 2022-10-30 ENCOUNTER — TRANSCRIPTION ENCOUNTER (OUTPATIENT)
Age: 77
End: 2022-10-30

## 2022-10-30 VITALS
TEMPERATURE: 98 F | DIASTOLIC BLOOD PRESSURE: 75 MMHG | OXYGEN SATURATION: 93 % | SYSTOLIC BLOOD PRESSURE: 126 MMHG | RESPIRATION RATE: 20 BRPM | HEART RATE: 86 BPM

## 2022-10-30 LAB
ALBUMIN SERPL ELPH-MCNC: 3.7 G/DL — SIGNIFICANT CHANGE UP (ref 3.3–5)
ALP SERPL-CCNC: 42 U/L — SIGNIFICANT CHANGE UP (ref 40–120)
ALT FLD-CCNC: 69 U/L — SIGNIFICANT CHANGE UP (ref 12–78)
ANION GAP SERPL CALC-SCNC: 8 MMOL/L — SIGNIFICANT CHANGE UP (ref 5–17)
AST SERPL-CCNC: 61 U/L — HIGH (ref 15–37)
BILIRUB SERPL-MCNC: 0.8 MG/DL — SIGNIFICANT CHANGE UP (ref 0.2–1.2)
BUN SERPL-MCNC: 20 MG/DL — SIGNIFICANT CHANGE UP (ref 7–23)
CALCIUM SERPL-MCNC: 10.2 MG/DL — HIGH (ref 8.5–10.1)
CHLORIDE SERPL-SCNC: 99 MMOL/L — SIGNIFICANT CHANGE UP (ref 96–108)
CO2 SERPL-SCNC: 29 MMOL/L — SIGNIFICANT CHANGE UP (ref 22–31)
CREAT SERPL-MCNC: 0.56 MG/DL — SIGNIFICANT CHANGE UP (ref 0.5–1.3)
CULTURE RESULTS: SIGNIFICANT CHANGE UP
CULTURE RESULTS: SIGNIFICANT CHANGE UP
EGFR: 95 ML/MIN/1.73M2 — SIGNIFICANT CHANGE UP
GLUCOSE SERPL-MCNC: 179 MG/DL — HIGH (ref 70–99)
HCT VFR BLD CALC: 35.5 % — SIGNIFICANT CHANGE UP (ref 34.5–45)
HGB BLD-MCNC: 11.3 G/DL — LOW (ref 11.5–15.5)
MAGNESIUM SERPL-MCNC: 1.9 MG/DL — SIGNIFICANT CHANGE UP (ref 1.6–2.6)
MCHC RBC-ENTMCNC: 28.3 PG — SIGNIFICANT CHANGE UP (ref 27–34)
MCHC RBC-ENTMCNC: 31.8 GM/DL — LOW (ref 32–36)
MCV RBC AUTO: 88.8 FL — SIGNIFICANT CHANGE UP (ref 80–100)
NRBC # BLD: 0 /100 WBCS — SIGNIFICANT CHANGE UP (ref 0–0)
PHOSPHATE SERPL-MCNC: 2.8 MG/DL — SIGNIFICANT CHANGE UP (ref 2.5–4.5)
PLATELET # BLD AUTO: 174 K/UL — SIGNIFICANT CHANGE UP (ref 150–400)
POTASSIUM SERPL-MCNC: 4.3 MMOL/L — SIGNIFICANT CHANGE UP (ref 3.5–5.3)
POTASSIUM SERPL-SCNC: 4.3 MMOL/L — SIGNIFICANT CHANGE UP (ref 3.5–5.3)
PROT SERPL-MCNC: 8.3 G/DL — SIGNIFICANT CHANGE UP (ref 6–8.3)
RBC # BLD: 4 M/UL — SIGNIFICANT CHANGE UP (ref 3.8–5.2)
RBC # FLD: 16.4 % — HIGH (ref 10.3–14.5)
SODIUM SERPL-SCNC: 136 MMOL/L — SIGNIFICANT CHANGE UP (ref 135–145)
SPECIMEN SOURCE: SIGNIFICANT CHANGE UP
SPECIMEN SOURCE: SIGNIFICANT CHANGE UP
WBC # BLD: 19.74 K/UL — HIGH (ref 3.8–10.5)
WBC # FLD AUTO: 19.74 K/UL — HIGH (ref 3.8–10.5)

## 2022-10-30 PROCEDURE — 83880 ASSAY OF NATRIURETIC PEPTIDE: CPT

## 2022-10-30 PROCEDURE — 80061 LIPID PANEL: CPT

## 2022-10-30 PROCEDURE — 80048 BASIC METABOLIC PNL TOTAL CA: CPT

## 2022-10-30 PROCEDURE — 70498 CT ANGIOGRAPHY NECK: CPT | Mod: MA

## 2022-10-30 PROCEDURE — U0005: CPT

## 2022-10-30 PROCEDURE — 81003 URINALYSIS AUTO W/O SCOPE: CPT

## 2022-10-30 PROCEDURE — 73522 X-RAY EXAM HIPS BI 3-4 VIEWS: CPT

## 2022-10-30 PROCEDURE — 73552 X-RAY EXAM OF FEMUR 2/>: CPT

## 2022-10-30 PROCEDURE — 87086 URINE CULTURE/COLONY COUNT: CPT

## 2022-10-30 PROCEDURE — 83605 ASSAY OF LACTIC ACID: CPT

## 2022-10-30 PROCEDURE — 87040 BLOOD CULTURE FOR BACTERIA: CPT

## 2022-10-30 PROCEDURE — 87635 SARS-COV-2 COVID-19 AMP PRB: CPT

## 2022-10-30 PROCEDURE — 85025 COMPLETE CBC W/AUTO DIFF WBC: CPT

## 2022-10-30 PROCEDURE — 97162 PT EVAL MOD COMPLEX 30 MIN: CPT

## 2022-10-30 PROCEDURE — 71045 X-RAY EXAM CHEST 1 VIEW: CPT

## 2022-10-30 PROCEDURE — 70450 CT HEAD/BRAIN W/O DYE: CPT | Mod: MA

## 2022-10-30 PROCEDURE — 84100 ASSAY OF PHOSPHORUS: CPT

## 2022-10-30 PROCEDURE — 36415 COLL VENOUS BLD VENIPUNCTURE: CPT

## 2022-10-30 PROCEDURE — 72131 CT LUMBAR SPINE W/O DYE: CPT | Mod: MA

## 2022-10-30 PROCEDURE — 83036 HEMOGLOBIN GLYCOSYLATED A1C: CPT

## 2022-10-30 PROCEDURE — 97530 THERAPEUTIC ACTIVITIES: CPT

## 2022-10-30 PROCEDURE — U0003: CPT

## 2022-10-30 PROCEDURE — 97116 GAIT TRAINING THERAPY: CPT

## 2022-10-30 PROCEDURE — 80053 COMPREHEN METABOLIC PANEL: CPT

## 2022-10-30 PROCEDURE — 82962 GLUCOSE BLOOD TEST: CPT

## 2022-10-30 PROCEDURE — 85027 COMPLETE CBC AUTOMATED: CPT

## 2022-10-30 PROCEDURE — 70496 CT ANGIOGRAPHY HEAD: CPT | Mod: MA

## 2022-10-30 PROCEDURE — 83735 ASSAY OF MAGNESIUM: CPT

## 2022-10-30 PROCEDURE — 99239 HOSP IP/OBS DSCHRG MGMT >30: CPT

## 2022-10-30 PROCEDURE — 99285 EMERGENCY DEPT VISIT HI MDM: CPT

## 2022-10-30 RX ORDER — ISOPROPYL ALCOHOL, BENZOCAINE .7; .06 ML/ML; ML/ML
1 SWAB TOPICAL
Qty: 100 | Refills: 1
Start: 2022-10-30 | End: 2022-12-18

## 2022-10-30 RX ADMIN — Medication 40 MILLIGRAM(S): at 06:07

## 2022-10-30 RX ADMIN — Medication 10 MILLIGRAM(S): at 12:02

## 2022-10-30 RX ADMIN — Medication 145 MILLIGRAM(S): at 12:01

## 2022-10-30 RX ADMIN — Medication 2: at 11:59

## 2022-10-30 RX ADMIN — Medication 150 MILLIGRAM(S): at 06:07

## 2022-10-30 RX ADMIN — Medication 1: at 07:42

## 2022-10-30 RX ADMIN — PANTOPRAZOLE SODIUM 40 MILLIGRAM(S): 20 TABLET, DELAYED RELEASE ORAL at 06:07

## 2022-10-30 RX ADMIN — Medication 20 MILLIGRAM(S): at 12:01

## 2022-10-30 NOTE — DISCHARGE NOTE NURSING/CASE MANAGEMENT/SOCIAL WORK - NSSCTYPOFSERV_GEN_ALL_CORE
Nursing and physical therapy.  Agency will call within 24-48 hours after discharge to arrange first visit. Home physical therapy.  If agency does not call you by 12noon day after discharge call to confirm they have accepted you for services.  If they cannot provide you PT services see below:

## 2022-10-30 NOTE — DISCHARGE NOTE NURSING/CASE MANAGEMENT/SOCIAL WORK - NSDCFUADDAPPT_GEN_ALL_CORE_FT
If additional home Physical Therapy providers are needed:  [facility] [phone]  [facility] [phone]  [facility] [place] [facility] [phone]  [facility] [phone]  (or any other local physical therapist in your area)

## 2022-10-30 NOTE — DISCHARGE NOTE NURSING/CASE MANAGEMENT/SOCIAL WORK - PATIENT PORTAL LINK FT
You can access the FollowMyHealth Patient Portal offered by [facility] by registering at the following website: [email or web address]. By joining [facility]’s FollowMyHealth portal, you will also be able to view your health information using other applications (apps) compatible with our system.

## 2022-10-30 NOTE — DISCHARGE NOTE NURSING/CASE MANAGEMENT/SOCIAL WORK - NSDCPEFALRISK_GEN_ALL_CORE
For information on Fall & Injury Prevention, visit: [email or web address] OR  [email or web address] OR  https://www.cdc.gov/steadi/patient.html

## 2022-10-30 NOTE — DISCHARGE NOTE NURSING/CASE MANAGEMENT/SOCIAL WORK - NSDCVIVACCINE_GEN_ALL_CORE_FT
influenza, injectable, quadrivalent, preservative free; 06-Nov-2016 11:37; Jay Clifton (RN); [employer]; [id number]; IntraMuscular; Deltoid Left.; 0.5 milliLiter(s); VIS (VIS Published: 07-Aug-2015, VIS Presented: 06-Nov-2016);

## 2023-03-23 ENCOUNTER — APPOINTMENT (OUTPATIENT)
Dept: SURGERY | Facility: CLINIC | Age: 78
End: 2023-03-23
Payer: MEDICARE

## 2023-03-23 VITALS
WEIGHT: 178 LBS | HEIGHT: 65 IN | HEART RATE: 95 BPM | BODY MASS INDEX: 29.66 KG/M2 | OXYGEN SATURATION: 95 % | DIASTOLIC BLOOD PRESSURE: 77 MMHG | TEMPERATURE: 98 F | SYSTOLIC BLOOD PRESSURE: 140 MMHG

## 2023-03-23 DIAGNOSIS — Z80.3 FAMILY HISTORY OF MALIGNANT NEOPLASM OF BREAST: ICD-10-CM

## 2023-03-23 PROCEDURE — 99213 OFFICE O/P EST LOW 20 MIN: CPT

## 2023-03-24 NOTE — HISTORY OF PRESENT ILLNESS
[de-identified] :  She is without any complaints in regards to her breasts.  She has a family history of breast carcinoma consistent with her mother, her maternal aunt, and maternal grandmother.  She is due for a bilateral mammogram and bilateral breast ultrasound.

## 2023-03-24 NOTE — CONSULT LETTER
[Dear  ___] : Dear  [unfilled], [Sincerely,] : Sincerely, [FreeTextEntry1] : I saw Edel Garza in the office today for a breast evaluation.  She is without any complaints in regards to her breasts.  She has a family history of breast carcinoma consistent with her mother, her maternal aunt, and maternal grandmother.  She had a benign bilateral breast ultrasound performed on January 13, 2023.  She had a benign bilateral mammogram performed on January 6, 2023.\par \par Her medical and surgical history were reviewed, her medications were reviewed. A review of systems was performed and documented.\par \par On Physical Exam:  General: No acute distress, conversant, and well-nourished.  Respiratory: Lungs are clear to auscultation with good inspiratory effort.  Cardiovascular: Heart is regular rate and rhythm with no murmurs.  Abdomen: Soft and nontender.  Skin: Good color, turgor, texture with no gross lesions.  Psychiatric: Awake, alert and oriented x3 with an appropriate affect.\par \par Pertinent physical exam:  Her breasts are dense and fibrocystic.  She has no masses, skin changes, axillary or supraclavicular lymphadenopathy.  Her nipples are everted bilaterally.  Her neck is supple.\par \par Impression: Fibrocystic breasts, strong family history of breast carcinoma.  Benign bilateral breast ultrasound on January 13, 2023.  Benign bilateral mammogram performed on January 6, 2023.\par \par Plan: Follow-up in 6 months [FreeTextEntry3] : Mark Chaudhry D.O., JAIME, FACS\par , Surgery [facility]\par  Surgery [facility]\par

## 2023-03-24 NOTE — PHYSICAL EXAM
[de-identified] : On Physical Exam:  General: No acute distress, conversant, and well-nourished.  Respiratory: Lungs are clear to auscultation with good inspiratory effort.  Cardiovascular: Heart is regular rate and rhythm with no murmurs.  Abdomen: Soft and nontender.  Skin: Good color, turgor, texture with no gross lesions.  Psychiatric: Awake, alert and oriented x3 with an appropriate affect. [de-identified] : Pertinent physical exam:  Her breasts are dense and fibrocystic.  She has no masses, skin changes, axillary or supraclavicular lymphadenopathy.  Her nipples are everted bilaterally.  Her neck is supple.\par

## 2023-09-28 ENCOUNTER — APPOINTMENT (OUTPATIENT)
Dept: SURGERY | Facility: CLINIC | Age: 78
End: 2023-09-28
Payer: MEDICARE

## 2023-09-28 VITALS
HEIGHT: 65 IN | HEART RATE: 88 BPM | SYSTOLIC BLOOD PRESSURE: 156 MMHG | BODY MASS INDEX: 31.65 KG/M2 | DIASTOLIC BLOOD PRESSURE: 84 MMHG | TEMPERATURE: 98.2 F | WEIGHT: 190 LBS | OXYGEN SATURATION: 94 %

## 2023-09-28 PROCEDURE — 99213 OFFICE O/P EST LOW 20 MIN: CPT

## 2023-10-23 NOTE — ASU PATIENT PROFILE, ADULT - NSSUBSTANCEUSE_GEN_ALL_CORE_SD
I called to check status and spoke with Naveed. The medication is APPROVED through 10/22/2024. If this requires a response please respond to the pool ( [phone]). Thank you please advise patient.
Submitted PA for Butalbital-ASA-Caff-Codeine -30-30MG capsules  Via [facility]: [id number] STATUS: PENDING. Follow up done daily; if no response in three days we will refax for status check. If another three days goes by with no response we will call the insurance for status.
Tried calling pharmacy 3x and kept getting busy signal. Wrote patient on mychart making her aware and to reach out to pharmacy.
never used

## 2024-03-28 ENCOUNTER — APPOINTMENT (OUTPATIENT)
Dept: SURGERY | Facility: CLINIC | Age: 79
End: 2024-03-28
Payer: MEDICARE

## 2024-03-28 VITALS
RESPIRATION RATE: 14 BRPM | BODY MASS INDEX: 31.65 KG/M2 | HEIGHT: 65 IN | HEART RATE: 89 BPM | OXYGEN SATURATION: 94 % | DIASTOLIC BLOOD PRESSURE: 72 MMHG | WEIGHT: 190 LBS | SYSTOLIC BLOOD PRESSURE: 116 MMHG | TEMPERATURE: 99.2 F

## 2024-03-28 DIAGNOSIS — N60.19 DIFFUSE CYSTIC MASTOPATHY OF UNSPECIFIED BREAST: ICD-10-CM

## 2024-03-28 DIAGNOSIS — Z12.39 ENCOUNTER FOR OTHER SCREENING FOR MALIGNANT NEOPLASM OF BREAST: ICD-10-CM

## 2024-03-28 PROCEDURE — 99214 OFFICE O/P EST MOD 30 MIN: CPT

## 2024-04-07 NOTE — ASU PREOP CHECKLIST - HEIGHT IN FEET
36-year-old female with no significant past medical history complaining epigastric pain and vomiting since 3 AM.  States she ate a salad for dinner and felt fine when she went to bed.  Denies sick contacts.  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  Denies fever cough shortness of breath chest pain diarrhea dysuria hematuria or pregnancy.  Took Maalox at home without improvement.  Describes vomit as saliva nonbilious nonbloody. 5

## 2024-04-27 NOTE — PATIENT PROFILE ADULT - NSPROPTRIGHTNOTIFY_GEN_A_NUR
Transportation is arranged back to facility, [employer] claimed the ride, will arrive at 08:30 AM contact phone [phone]     Trisha Ramirez RN  04/27/24 7946     yes

## 2024-07-22 NOTE — ED ADULT TRIAGE NOTE - WEIGHT IN LBS
Thyroid    Lab Results   Component Value Date    DAC8ZSWYPPGA 8.805 (H) 06/20/2024    SKK0BEUNTRKH 3.481 04/01/2024    CTT7HKLTMKZV 8.008 (H) 09/27/2023    FREET4 0.70 06/20/2024    FREET4 0.52 (L) 09/27/2023     Patient with hypothyroidism previously on levothyroxine 25 mcg  Currently patient reports that he is no longer medication DC'd prior.  Will order repeat TSH as last TSH was noted to be elevated   179.8

## 2024-09-05 ENCOUNTER — APPOINTMENT (OUTPATIENT)
Dept: SURGERY | Facility: CLINIC | Age: 79
End: 2024-09-05
Payer: MEDICARE

## 2024-09-05 VITALS
HEIGHT: 65 IN | RESPIRATION RATE: 14 BRPM | TEMPERATURE: 98.6 F | HEART RATE: 90 BPM | BODY MASS INDEX: 31.65 KG/M2 | OXYGEN SATURATION: 94 % | WEIGHT: 190 LBS | DIASTOLIC BLOOD PRESSURE: 71 MMHG | SYSTOLIC BLOOD PRESSURE: 121 MMHG

## 2024-09-05 DIAGNOSIS — N63.0 UNSPECIFIED LUMP IN UNSPECIFIED BREAST: ICD-10-CM

## 2024-09-05 PROCEDURE — 99213 OFFICE O/P EST LOW 20 MIN: CPT

## 2024-10-18 NOTE — ED ADULT NURSE NOTE - SKIN TURGOR
Anesthesia Post-op Note    Patient: Rosaura Dash  Procedure(s) Performed: HYSTERECTOMY, LAPAROSCOPIC TOTAL (Uterus)  SALPINGECTOMY, LAPAROSCOPIC BILATERAL (Bilateral: Abdomen)  Anesthesia type: General    Vitals Value Taken Time   Temp 36.2 °C (97.2 °F) 10/18/24 1130   Pulse 73 10/18/24 1135   Resp 12 10/18/24 1135   SpO2 95 % 10/18/24 1135   /56 10/18/24 1135         Patient Location: PACU Phase 1  Post-op Vital Signs:stable  Level of Consciousness: awake and alert  Respiratory Status: spontaneous ventilation  Cardiovascular stable  Hydration: euvolemic  Pain Management: adequately controlled  Handoff: Handoff to receiving clinician was performed and questions were answered  Vomiting: none  Nausea: None  Airway Patency:patent  Post-op Assessment: no complications and patient tolerated procedure well      No notable events documented.                       resilient/elastic

## 2025-01-31 NOTE — ED ADULT NURSE NOTE - CAS TRG GEN SKIN COLOR
Caller: Cindy SARAH    Relationship: Self    Best call back number:     [phone]       What orders are you requesting (i.e. lab or imaging): LABS FIR KIDNEY FUNCTION     In what timeframe would the patient need to come in: NEEDS ORDER BEFORE WED. 2.5.25    Where will you receive your lab/imaging services: IN OFFICE            
Spoke with pt labs pended to provider   
Normal for race

## 2025-03-24 ENCOUNTER — APPOINTMENT (OUTPATIENT)
Dept: SURGERY | Facility: CLINIC | Age: 80
End: 2025-03-24
Payer: MEDICARE

## 2025-03-24 VITALS
BODY MASS INDEX: 31.65 KG/M2 | TEMPERATURE: 98.3 F | SYSTOLIC BLOOD PRESSURE: 184 MMHG | DIASTOLIC BLOOD PRESSURE: 99 MMHG | HEIGHT: 65 IN | WEIGHT: 190 LBS | RESPIRATION RATE: 14 BRPM | HEART RATE: 87 BPM | OXYGEN SATURATION: 92 %

## 2025-03-24 DIAGNOSIS — N60.19 DIFFUSE CYSTIC MASTOPATHY OF UNSPECIFIED BREAST: ICD-10-CM

## 2025-03-24 DIAGNOSIS — Z12.39 ENCOUNTER FOR OTHER SCREENING FOR MALIGNANT NEOPLASM OF BREAST: ICD-10-CM

## 2025-03-24 PROCEDURE — 99214 OFFICE O/P EST MOD 30 MIN: CPT
